# Patient Record
Sex: MALE | Race: WHITE | Employment: OTHER | URBAN - METROPOLITAN AREA
[De-identification: names, ages, dates, MRNs, and addresses within clinical notes are randomized per-mention and may not be internally consistent; named-entity substitution may affect disease eponyms.]

---

## 2017-05-17 ENCOUNTER — OFFICE VISIT (OUTPATIENT)
Dept: NEUROLOGY | Age: 37
End: 2017-05-17

## 2017-05-17 VITALS
TEMPERATURE: 98.4 F | SYSTOLIC BLOOD PRESSURE: 110 MMHG | OXYGEN SATURATION: 98 % | WEIGHT: 122.6 LBS | HEART RATE: 98 BPM | BODY MASS INDEX: 16.61 KG/M2 | HEIGHT: 72 IN | DIASTOLIC BLOOD PRESSURE: 70 MMHG | RESPIRATION RATE: 18 BRPM

## 2017-05-17 DIAGNOSIS — M54.12 LEFT CERVICAL RADICULOPATHY: Primary | ICD-10-CM

## 2017-05-17 DIAGNOSIS — R20.0 LEFT ARM NUMBNESS: ICD-10-CM

## 2017-05-17 NOTE — PROGRESS NOTES
5 [facility] 91   [place] [address] [facility] [address] [facility]0 [facility]   Marlon Gray 57    [place]   [phone] Fax             Referring: Carlos Haas MD      Chief Complaint   Patient presents with   Kindra Ba Establish Care     NP: Neuropathy. Pt complains of \"burning\" pains that radiate from neck down left arm, numbness in different limbs,      33-year-old gentleman who presents today accompanied by his mother for evaluation of what he calls a burning pain in the neck shooting down the left upper extremity and left upper extremity numbness. He said his symptoms of a ongoing now for about 6-8 months worsening but they have been present for longer than that. He does not recall any injury. No inciting factor. He crossed the deltoid down the thenar side of the arm primarily although at times in the ulnar side can be affected as well. He notes that most fingers are affected notes that his left upper extremity feels numb most of the time. He says the left shoulder blade is also numb and hot water to be sensitive by him on the left shoulder blade. He notes that this has now started to happen on the right as well. If he lays flat he has some numbness in the upper extremities. His mother says he is dropping things. He has had previous evaluation back several years ago with MRI of the cervical spine which he was told showed some degenerative changes and he had an EMG that was said to show some abnormality but it was nothing worrisome. His father passed in 2010 [facility] [address] [facility] he notes that when his father was diagnosed a couple years prior to that the family had EMGs performed. He had an MRI of the brain performed because someone was concerned regarding potential demyelinating disease and that was normal.  Those images are in the electronic medical record. He also notes that his gait is off but he attributes that to his hip dysplasia. Uses a cane.   Notes that he does not use a cane he is unable to ambulate steadily. No recent fall. Past Medical History:   Diagnosis Date    ADD (attention deficit disorder)     Anemia     iron deficiency    Asthma     Bipolar 1 disorder (HCC)     Congenital hip dysplasia     Headache     Hip dysplasia, congenital     Psychiatric disorder     Scoliosis     Scoliosis        Past Surgical History:   Procedure Laterality Date    HX ORTHOPAEDIC  1980    left thigh            Allergies   Allergen Reactions    Cymbalta [Duloxetine] Other (comments)     Suicidal thoughts       Social History   Substance Use Topics    Smoking status: Light Tobacco Smoker    Smokeless tobacco: None    Alcohol use No       No family history on file. Review of Systems  Pertinent positives and negatives as stated above. Remainder of comprehensive review is negative. Examination  Visit Vitals    /70    Pulse 98    Temp 98.4 °F (36.9 °C) (Temporal)    Resp 18    Ht 6' (1.829 m)    Wt 55.6 kg (122 lb 9.6 oz)    SpO2 98%    BMI 16.63 kg/m2     He is a very thin gentleman a bit disheveled appearing. Has anicteric sclera. Poor dentition. I do not hear bruit. His heart is regular. He has symmetric pulses. He has no edema of the extremities. On neurological examination he is awake alert oriented and friendly. He is dysmorphic appearing with low set ears. He also has evidence of repaired cleft. He has temporal wasting. He has some frontal bossing as well. He has full versions without nystagmus. Pupils react bilaterally. Optic disc margins not well seen. Symmetric face except for that noted and he has tongue and palate midline. He has no abnormal movement. No pronation or drift. He resists fully in the upper and lower extremities in all muscle groups to direct testing. Reflexes globally depressed throughout. No pathologic reflexes. Intact finger to nose bilaterally. Sensory is unreliable.   He relates with a cane a bit of a limp favoring the right leg slightly wide-based. Impression/Plan  41-year-old gentleman with pain as the upper extremity and shoulder blade. He also endorses numbness of the left upper extremity and he has been dropping things as stated. He has a reassuring examination at this point. He will undergo MRI of the cervical spine as well as of the upper extremities. He will return once that has been completed. This note was created using voice recognition software. Despite editing, there may be syntax errors. This note will not be viewable in [address]

## 2017-05-17 NOTE — MR AVS SNAPSHOT
Visit Information Date & Time Provider Department Dept. Phone Encounter #  
 5/17/2017  2:15 PM Murali Levy MD [address] [phone] [id number] Follow-up Instructions Return for After Tests. Follow-up and Disposition History Your Appointments 6/27/2017 10:00 AM  
Follow Up with Joselyn Sarabia NP [address] ([address]) Appt Note: f/u; MRI & EMG  
 [address] [id number] [phone]  
  
   
 [facility] [address] Upcoming Health Maintenance Date Due Pneumococcal 19-64 Medium Risk (1 of 1 - PPSV23) 9/9/1997 DTaP/Tdap/Td series (1 - Tdap) 9/9/1999 INFLUENZA AGE 9 TO ADULT 8/1/2017 Allergies as of 5/17/2017  Review Complete On: 5/17/2017 By: Shayna Parker LPN Severity Noted Reaction Type Reactions Cymbalta [Duloxetine]  05/17/2017    Other (comments) Suicidal thoughts Current Immunizations  Never Reviewed No immunizations on file. Not reviewed this visit You Were Diagnosed With   
  
 Codes Comments Left cervical radiculopathy    -  Primary ICD-10-CM: M54.12 
ICD-9-CM: 723.4 Left arm numbness     ICD-10-CM: R20.0 ICD-9-CM: 308. 0 Vitals BP Pulse Temp Resp Height(growth percentile) Weight(growth percentile) 110/70 98 98.4 °F (36.9 °C) (Temporal) 18 6' (1.829 m) 122 lb 9.6 oz (55.6 kg) SpO2 BMI Smoking Status 98% 16.63 kg/m2 Light Tobacco Smoker Vitals History BMI and BSA Data Body Mass Index Body Surface Area  
 16.63 kg/m 2 1.68 m 2 Your Updated Medication List  
  
Notice  As of 5/17/2017 11:59 PM  
 You have not been prescribed any medications. Follow-up Instructions Return for After Tests. To-Do List   
 05/17/2017 Neurology:  EMG LIMITED   
  
 05/17/2017 Imaging:  MRI CERV SPINE WO CONT Introducing [facility]! Christina Shipley introduces [employer] patient portal. Now you can access parts of your medical record, email your doctor's office, and request medication refills online. 1. In your internet browser, go to [email or web address] 2. Click on the First Time User? Click Here link in the Sign In box. You will see the New Member Sign Up page. 3. Enter your [employer] Access Code exactly as it appears below. You will not need to use this code after youve completed the sign-up process. If you do not sign up before the expiration date, you must request a new code. · [employer] Access Code: [email or web address] Expires: 8/15/2017  1:46 PM 
 
4. Enter the last four digits of your Social Security Number (xxxx) and Date of Birth (mm/dd/yyyy) as indicated and click Submit. You will be taken to the next sign-up page. 5. Create a [employer] ID. This will be your [employer] login ID and cannot be changed, so think of one that is secure and easy to remember. 6. Create a [employer] password. You can change your password at any time. 7. Enter your Password Reset Question and Answer. This can be used at a later time if you forget your password. 8. Enter your e-mail address. You will receive e-mail notification when new information is available in [address] 9. Click Sign Up. You can now view and download portions of your medical record. 10. Click the Download Summary menu link to download a portable copy of your medical information. If you have questions, please visit the Frequently Asked Questions section of the [employer] website. Remember, [employer] is NOT to be used for urgent needs. For medical emergencies, dial 911. Now available from your iPhone and Android! Please provide this summary of care documentation to your next provider. Your primary care clinician is listed as Taya Avalos. If you have any questions after today's visit, please call [phone].

## 2017-06-05 ENCOUNTER — HOSPITAL ENCOUNTER (OUTPATIENT)
Dept: MRI IMAGING | Age: 37
Discharge: HOME | End: 2017-06-05
Payer: SUBSIDIZED

## 2017-06-05 DIAGNOSIS — R20.0 LEFT ARM NUMBNESS: ICD-10-CM

## 2017-06-05 DIAGNOSIS — M54.12 LEFT CERVICAL RADICULOPATHY: ICD-10-CM

## 2017-06-05 PROCEDURE — 72141 MRI NECK SPINE W/O DYE: CPT

## 2017-06-21 ENCOUNTER — OFFICE VISIT (OUTPATIENT)
Dept: NEUROLOGY | Age: 37
End: 2017-06-21

## 2017-06-21 DIAGNOSIS — M54.12 LEFT CERVICAL RADICULOPATHY: Primary | ICD-10-CM

## 2017-06-21 DIAGNOSIS — R20.0 LEFT ARM NUMBNESS: ICD-10-CM

## 2017-06-21 NOTE — COMMUNICATION BODY
[place] [address] [place], [address]  [phone]      [facility]    Neurophysiology Report      Patient: [facility]     ID: [id number] Physician: Mario Huerta. Natasha Payton MD   Gender: Male Ref Phys: Madina Macias NP   Handedness:      Study Date: June 21, 2017         Patient History: This 53-year-old man has been complaining of numbness and tingling in his arms as well as neck pain.     Nerve Conduction Studies  Anti Sensory Summary Table     Stim Site NR Peak (ms) Norm Peak (ms) O-P Amp (µV) Norm O-P Amp Dist (cm) Anthony (m/s)   Left Median 2nd Digit Anti Sensory (2nd Digit)   Wrist    3.8 <3.5 43.6 >20 13.0 46.4   Site 2    3.8  42.1      Right Median 2nd Digit Anti Sensory (2nd Digit)   Wrist    3.6 <3.5 43.4 >20 13.0 50.0   Site 2    3.6  44.1      Left Ulnar Anti Sensory (5th Digit )   Wrist    3.3 <3.1 40.7 >17.0 11.0 44.0   Site 2    3.3  44.6      Right Ulnar Anti Sensory (5th Digit )   Wrist    3.3 <3.1 35.1 >17.0 11.0 47.8   Site 2    3.4  36.5        Motor Summary Table     Stim Site NR Onset (ms) Norm Onset (ms) O-P Amp (mV) Norm O-P Amp Dist (cm) Anthony (m/s) Norm Anthony (m/s)   Left Median Motor (Abd Poll Brev)   Wrist    4.7 <4.4 8.6 >4.0 21.0 56.8 >49   Elbow    8.4  8.2       Right Median Motor (Abd Poll Brev)   Wrist    4.0 <4.4 9.3 >4.0 21.0 50.0 >49   Elbow    8.2  9.2       Left Ulnar Motor (Abd Dig Minimi )   Wrist    3.0 <3.3 5.0 >6.0 21.0 56.8 >49   B Elbow    6.7  4.9  11.0 52.4 >50   A Elbow    8.8  4.2       Right Ulnar Motor (Abd Dig Minimi )   Wrist    3.3 <3.3 4.8 >6.0 21.0 51.2 >49   B Elbow    7.4  4.5  11.0 50.0 >50   A Elbow    9.6  4.7           EMG     Side Muscle Nerve Root Ins Act Fibs Psw Fasc Amp Dur Poly Recrt Int Vernida Guillermo Comment   Left Deltoid Axillary C5-6 Nml Nml Nml None Nml Nml 0 Nml Nml    Left Biceps Musculocut C5-6 Nml Nml Nml None Nml Nml 0 Nml Nml    Left Triceps Radial C6-7-8 Nml Nml Nml None Nml Nml 0 Nml Nml    Left FlexCarRad Median C6-7 Nml Nml Nml None Nml Nml 0 Nml Nml    Left 1stDorInt Ulnar C8-T1 Nml Nml Nml None Nml Nml 0 Nml Nml    Left Abd Poll Brev Median C8-T1 Nml Nml Nml None Nml Nml 0 Nml Nml    Left Rhomboid Major DorsalScap C5 Nml Nml Nml None Nml Nml 0 Nml Nml    Left Supraspinatus SupraScap C5-6 Nml Nml Nml None Nml Nml 0 Nml Nml    Left Cervical Parasp Up Rami C1-3 Nml Nml Nml          Left Cervical Parasp Mid Rami C4-6 Nml Nml Nml          Left Cervical Parasp Low Rami C7-8 Nml Nml Nml            NCS/EMG FINDINGS:     Evaluation of the Left median motor nerve showed prolonged distal onset latency (4.7 ms).  The Right median motor, the Left ulnar motor, and the Right ulnar motor nerves were unremarkable.  The Left Median 2nd Digit sensory nerve showed prolonged distal peak latency (3.8 ms) and decreased conduction velocity (Wrist-2nd Digit, 46.4 m/s).  The Right Median 2nd Digit sensory nerve showed prolonged distal peak latency (3.6 ms).  The Left ulnar sensory and the Right ulnar sensory nerves showed prolonged distal peak latency (L3.3, R3.3 ms) and decreased conduction velocity (Wrist-5th Digit, L44.0, R47.8 m/s). 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 ___________________________  Anya Porter MD          Waveforms:

## 2017-06-21 NOTE — LETTER
6/21/2017 1:08 PM 
 
Patient:  Janae Services YOB: 1980 Date of Visit: 6/21/2017 Dear Bibiana Juarez MD 
[address] [facility] [address] VIA Facsimile: [phone] Fer Medrano NP 
[address] [facility] [address] [facility] [address] VIA In Basket 
 : Thank you for referring Mr. Janae Jean to me for evaluation/treatment. Below are the relevant portions of my assessment and plan of care. [facility] [address] [facility] [place], [address] 
[phone]      [place] [address] Neurophysiology Report PatientCarpraveena Campbell    
ID: [id number] Physician: Teresa Gresham. Alberto Larsen MD  
Gender: Male Ref Phys: Constance Wang NP Handedness:     
Study Date: June 21, 2017 Patient History: This 12-year-old man has been complaining of numbness and tingling in his arms as well as neck pain. Nerve Conduction Studies Anti Sensory Summary Table Stim Site NR Peak (ms) Norm Peak (ms) O-P Amp (µV) Norm O-P Amp Dist (cm) Anthony (m/s) Left Median 2nd Digit Anti Sensory (2nd Digit) Wrist    3.8 <3.5 43.6 >20 13.0 46.4 Site 2    3.8  42.1 Right Median 2nd Digit Anti Sensory (2nd Digit) Wrist    3.6 <3.5 43.4 >20 13.0 50.0 Site 2    3.6  44.1 Left Ulnar Anti Sensory (5th Digit ) Wrist    3.3 <3.1 40.7 >17.0 11.0 44.0 Site 2    3.3  44.6 Right Ulnar Anti Sensory (5th Digit ) Wrist    3.3 <3.1 35.1 >17.0 11.0 47.8 Site 2    3.4  36.5 Motor Summary Table Stim Site NR Onset (ms) Norm Onset (ms) O-P Amp (mV) Norm O-P Amp Dist (cm) Anthony (m/s) Norm Anthony (m/s) Left Median Motor (Abd Poll Brev) Wrist    4.7 <4.4 8.6 >4.0 21.0 56.8 >49 Elbow    8.4  8.2 Right Median Motor (Abd Poll Brev) Wrist    4.0 <4.4 9.3 >4.0 21.0 50.0 >49 Elbow    8.2  9.2 Left Ulnar Motor (Abd Dig Minimi ) Wrist    3.0 <3.3 5.0 >6.0 21.0 56.8 >49 B Elbow    6.7  4.9  11.0 52.4 >50 A Elbow    8.8  4.2 Right Ulnar Motor (Abd Dig Minimi ) Wrist    3.3 <3.3 4.8 >6.0 21.0 51.2 >49 B Elbow    7.4  4.5  11.0 50.0 >50 A Elbow    9.6  4.7 EMG Side Muscle Nerve Root Ins Act Fibs Psw Fasc Amp Dur Poly Recrt Int Darryl Tatiana Comment Left Deltoid Axillary C5-6 Nml Nml Nml None Nml Nml 0 Nml Nml Left Biceps Musculocut C5-6 Nml Nml Nml None Nml Nml 0 Nml Nml Left Triceps Radial C6-7-8 Nml Nml Nml None Nml Nml 0 Nml Nml Left FlexCarRad Median C6-7 Nml Nml Nml None Nml Nml 0 Nml Nml Left 1stDorInt Ulnar C8-T1 Nml Nml Nml None Nml Nml 0 Nml Nml Left Abd Poll Brev Median C8-T1 Nml Nml Nml None Nml Nml 0 Nml Nml Left Rhomboid Major DorsalScap C5 Nml Nml Nml None Nml Nml 0 Nml Nml Left Supraspinatus SupraScap C5-6 Nml Nml Nml None Nml Nml 0 Nml Nml Left Cervical Parasp Up Rami C1-3 Nml Nml Nml Left Cervical Parasp Mid Rami C4-6 Nml Nml Nml Left Cervical Parasp Low Rami C7-8 Nml Nml Nml NCS/EMG FINDINGS: 
 
? Evaluation of the Left median motor nerve showed prolonged distal onset latency (4.7 ms). ? The Right median motor, the Left ulnar motor, and the Right ulnar motor nerves were unremarkable. ? The Left Median 2nd Digit sensory nerve showed prolonged distal peak latency (3.8 ms) and decreased conduction velocity (Wrist-2nd Digit, 46.4 m/s). ? The Right Median 2nd Digit sensory nerve showed prolonged distal peak latency (3.6 ms). ? The Left ulnar sensory and the Right ulnar sensory nerves showed prolonged distal peak latency (L3.3, R3.3 ms) and decreased conduction velocity (Wrist-5th Digit, L44.0, R47.8 m/s). INTERPRETATION: This was an abnormal nerve conduction EMG study showing there to be: 
 
 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 
 
 (2) prolongation of the distal latency in the left median nerve supportive of a left-sided carpal tunnel syndrome. ___________________________ Yoan Lin MD 
 
 
 
 
 Waveforms: If you have questions, please do not hesitate to call me. I look forward to following Mr. Zuniga along with you.  
 
 
 
Sincerely, 
 
 
Omar Barrientos MD

## 2017-06-21 NOTE — PROGRESS NOTES
[facility]  [address] [place] [address] [place], [address] [phone]      [address]    Neurophysiology Report      Patient: [facility]     ID: [id number] Physician: Wilian Shea. Gonzalo Novoa MD   Gender: Male Ref Phys: Jun Sears NP   Handedness:      Study Date: June 21, 2017         Patient History: This 68-year-old man has been complaining of numbness and tingling in his arms as well as neck pain.     Nerve Conduction Studies  Anti Sensory Summary Table     Stim Site NR Peak (ms) Norm Peak (ms) O-P Amp (µV) Norm O-P Amp Dist (cm) Anthony (m/s)   Left Median 2nd Digit Anti Sensory (2nd Digit)   Wrist    3.8 <3.5 43.6 >20 13.0 46.4   Site 2    3.8  42.1      Right Median 2nd Digit Anti Sensory (2nd Digit)   Wrist    3.6 <3.5 43.4 >20 13.0 50.0   Site 2    3.6  44.1      Left Ulnar Anti Sensory (5th Digit )   Wrist    3.3 <3.1 40.7 >17.0 11.0 44.0   Site 2    3.3  44.6      Right Ulnar Anti Sensory (5th Digit )   Wrist    3.3 <3.1 35.1 >17.0 11.0 47.8   Site 2    3.4  36.5        Motor Summary Table     Stim Site NR Onset (ms) Norm Onset (ms) O-P Amp (mV) Norm O-P Amp Dist (cm) Anthony (m/s) Norm Anthony (m/s)   Left Median Motor (Abd Poll Brev)   Wrist    4.7 <4.4 8.6 >4.0 21.0 56.8 >49   Elbow    8.4  8.2       Right Median Motor (Abd Poll Brev)   Wrist    4.0 <4.4 9.3 >4.0 21.0 50.0 >49   Elbow    8.2  9.2       Left Ulnar Motor (Abd Dig Minimi )   Wrist    3.0 <3.3 5.0 >6.0 21.0 56.8 >49   B Elbow    6.7  4.9  11.0 52.4 >50   A Elbow    8.8  4.2       Right Ulnar Motor (Abd Dig Minimi )   Wrist    3.3 <3.3 4.8 >6.0 21.0 51.2 >49   B Elbow    7.4  4.5  11.0 50.0 >50   A Elbow    9.6  4.7           EMG     Side Muscle Nerve Root Ins Act Fibs Psw Fasc Amp Dur Poly Recrt Int Harpreet Lozano Comment   Left Deltoid Axillary C5-6 Nml Nml Nml None Nml Nml 0 Nml Nml    Left Biceps Musculocut C5-6 Nml Nml Nml None Nml Nml 0 Nml Nml    Left Triceps Radial C6-7-8 Nml Nml Nml None Nml Nml 0 Nml Nml    Left FlexCarRad Median C6-7 Nml Nml Nml None Nml Nml 0 Nml Nml    Left 1stDorInt Ulnar C8-T1 Nml Nml Nml None Nml Nml 0 Nml Nml    Left Abd Poll Brev Median C8-T1 Nml Nml Nml None Nml Nml 0 Nml Nml    Left Rhomboid Major DorsalScap C5 Nml Nml Nml None Nml Nml 0 Nml Nml    Left Supraspinatus SupraScap C5-6 Nml Nml Nml None Nml Nml 0 Nml Nml    Left Cervical Parasp Up Rami C1-3 Nml Nml Nml          Left Cervical Parasp Mid Rami C4-6 Nml Nml Nml          Left Cervical Parasp Low Rami C7-8 Nml Nml Nml            NCS/EMG FINDINGS:     Evaluation of the Left median motor nerve showed prolonged distal onset latency (4.7 ms).  The Right median motor, the Left ulnar motor, and the Right ulnar motor nerves were unremarkable.  The Left Median 2nd Digit sensory nerve showed prolonged distal peak latency (3.8 ms) and decreased conduction velocity (Wrist-2nd Digit, 46.4 m/s).  The Right Median 2nd Digit sensory nerve showed prolonged distal peak latency (3.6 ms).  The Left ulnar sensory and the Right ulnar sensory nerves showed prolonged distal peak latency (L3.3, R3.3 ms) and decreased conduction velocity (Wrist-5th Digit, L44.0, R47.8 m/s). 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 ___________________________  Car Larsen MD          Waveforms:                      [facility]  OFFICE PROCEDURE PROGRESS NOTE        Chart reviewed for the following:   Pascual Gonzales MD, have reviewed the History, Physical and updated the Allergic reactions for [place] [address] performed immediately prior to start of procedure:   Pascual Gonzales MD, have performed the following reviews on [facility] prior to the start of the procedure:            * Patient was identified by name and date of birth   * Agreement on procedure being performed was verified  * Risks and Benefits explained to the patient  * Procedure site verified and marked as necessary  * Patient was positioned for comfort  * Consent was signed and verified     Time: 1:08 PM    Date of procedure: 6/21/2017    Procedure performed by:  Omar Barrientos MD    Provider assisted by: Neymar Hein     Patient assisted by: self    How tolerated by patient: tolerated the procedure well with no complications    Post Procedural Pain Scale: 0 - No Hurt    Comments: none

## 2017-06-27 ENCOUNTER — OFFICE VISIT (OUTPATIENT)
Dept: NEUROLOGY | Age: 37
End: 2017-06-27

## 2017-06-27 VITALS
BODY MASS INDEX: 16.9 KG/M2 | HEIGHT: 72 IN | HEART RATE: 86 BPM | SYSTOLIC BLOOD PRESSURE: 110 MMHG | DIASTOLIC BLOOD PRESSURE: 70 MMHG | TEMPERATURE: 97.9 F | WEIGHT: 124.8 LBS | RESPIRATION RATE: 18 BRPM | OXYGEN SATURATION: 99 %

## 2017-06-27 DIAGNOSIS — R20.0 NUMBNESS AND TINGLING IN LEFT UPPER EXTREMITY: ICD-10-CM

## 2017-06-27 DIAGNOSIS — R20.2 NUMBNESS AND TINGLING IN LEFT UPPER EXTREMITY: ICD-10-CM

## 2017-06-27 DIAGNOSIS — R20.2 NUMBNESS AND TINGLING OF RIGHT ARM: ICD-10-CM

## 2017-06-27 DIAGNOSIS — R20.0 NUMBNESS AND TINGLING OF RIGHT ARM: ICD-10-CM

## 2017-06-27 DIAGNOSIS — M48.02 CERVICAL SPINAL STENOSIS: Primary | ICD-10-CM

## 2017-06-27 NOTE — MR AVS SNAPSHOT
Visit Information Date & Time Provider Department Dept. Phone Encounter #  
 6/27/2017 10:00 AM Billie Rosenthal NP [facility] [facility] [phone] [id number] Follow-up Instructions Return in about 3 months (around 9/27/2017). Upcoming Health Maintenance Date Due Pneumococcal 19-64 Medium Risk (1 of 1 - PPSV23) 9/9/1997 DTaP/Tdap/Td series (1 - Tdap) 9/9/1999 INFLUENZA AGE 9 TO ADULT 8/1/2017 Allergies as of 6/27/2017  Review Complete On: 6/27/2017 By: Cloteal Nip, LPN Severity Noted Reaction Type Reactions Cymbalta [Duloxetine]  05/17/2017    Other (comments) Suicidal thoughts Current Immunizations  Never Reviewed No immunizations on file. Not reviewed this visit You Were Diagnosed With   
  
 Codes Comments Cervical spinal stenosis    -  Primary ICD-10-CM: M48.02 
ICD-9-CM: 723.0 Numbness and tingling in left upper extremity     ICD-10-CM: R20.0, R20.2 ICD-9-CM: 782.0 Numbness and tingling of right arm     ICD-10-CM: R20.0, R20.2 ICD-9-CM: 312. 0 Vitals BP Pulse Temp Resp Height(growth percentile) Weight(growth percentile) 110/70 86 97.9 °F (36.6 °C) (Temporal) 18 6' (1.829 m) 124 lb 12.8 oz (56.6 kg) SpO2 BMI Smoking Status 99% 16.93 kg/m2 Light Tobacco Smoker BMI and BSA Data Body Mass Index Body Surface Area  
 16.93 kg/m 2 1.7 m 2 Your Updated Medication List  
  
Notice  As of 6/27/2017 10:43 AM  
 You have not been prescribed any medications. We Performed the Following REFERRAL TO PHYSICAL THERAPY [QGT67 Custom] Comments:  
 Please evaluate patient for cervical pain and pain to the upper extremities L>R. Follow-up Instructions Return in about 3 months (around 9/27/2017). Referral Information Referral ID Referred By Referred To  
  
 [id number] Chasity Deras Not Available Visits Status Start Date End Date 1 New Request 6/27/17 6/27/18 If your referral has a status of pending review or denied, additional information will be sent to support the outcome of this decision. Introducing [facility]! Dear Rosalie Roldan: Thank you for requesting a [employer] account. Our records indicate that you already have an active [employer] account. You can access your account anytime at [email or web address] Did you know that you can access your hospital and ER discharge instructions at any time in [employer]? You can also review all of your test results from your hospital stay or ER visit. Additional Information If you have questions, please visit the Frequently Asked Questions section of the [employer] website at [email or web address] Remember, [employer] is NOT to be used for urgent needs. For medical emergencies, dial 911. Now available from your iPhone and Android! Please provide this summary of care documentation to your next provider. Your primary care clinician is listed as Megan Pyle. If you have any questions after today's visit, please call [phone].

## 2017-06-27 NOTE — PROGRESS NOTES
[facility]  [address], [address], [facility], [address]  [address] Yanick Garcia, [address]  Office:  [phone]  Fax: [phone]  Chief Complaint   Patient presents with    Neurologic Problem     F/U cervical radiculopathy, mri, emg      This is a 43year old male presents for follow up results. He continues with neck pain and pain going down his left arm. He says his right arm is also affected. Both limbs can have numbness and tingling but definitely worse on the left side. He reports his father passed from [address] 10 years ago and he had concerns of his symptoms being similar. He has a negative MRI of the brain for any demyelinating process. He has a past EMG that was unrevealing. He denies history of past injury of aggravating factors, but he does recall \"sparring\" with his brother when they were younger and wonders if this is why he has problems with his neck. He has not tried physical therapy. He is not taking any daily prescriptions to manage his symptoms. Past Medical History:   Diagnosis Date    ADD (attention deficit disorder)     Anemia     iron deficiency    Asthma     Bipolar 1 disorder (HCC)     Congenital hip dysplasia     Headache     Hip dysplasia, congenital     Psychiatric disorder     Scoliosis     Scoliosis        Past Surgical History:   Procedure Laterality Date    HX ORTHOPAEDIC  1980    left thigh            Allergies   Allergen Reactions    Cymbalta [Duloxetine] Other (comments)     Suicidal thoughts       Social History   Substance Use Topics    Smoking status: Light Tobacco Smoker    Smokeless tobacco: Former User    Alcohol use No       History reviewed. No pertinent family history. Review of Systems  Pertinent positives and negatives as noted otherwise comprehensive review is negative.     Examination  Visit Vitals    /70    Pulse 86    Temp 97.9 °F (36.6 °C) (Temporal)    Resp 18    Ht 6' (1.829 m)    Wt 56.6 kg (124 lb 12.8 oz)    SpO2 99%    BMI 16.93 kg/m2     This is a pleasant 43year old male, well appearing. No icterus. Oropharynx clear. Supple neck without bruit. Heart regular. No murmur. No edema. Neurologically, he is awake, alert, oriented with normal speech, language, and cognition. Visual fields are full to direct confrontational testing. Pupils react bilaterally. He has full versions without nystagmus. Face with a well healed scar above his top lip vertical with symmetrical facial sensation. Tongue and palate are midline. Shoulder shrug is symmetrical. Hearing is intact to conversation. He has normal bulk and tone. There is no abnormal movement. There is no pronation or drift. He is able to generate full strength in the upper and lower extremities. Reflexes are symmetrical in the upper and lower extremities bilaterally. His toes are down going. There is no finger flexor reflex bilaterally. Finger nose finger and rapid alternating movements are normal.  He is able to ambulate without difficulty. He ambulated with a cane. Impression/Plan  Stephen Bassett is a 43 y.o. male whose history and physical are consistent with cervical stenosis. We discussed results of cervical MRI and finding consistent with degeneration and cervical stenosis. EMG findings are not consistent with a radiculopathy. Patient has not tried physical therapy and is open to this. I will start with conservative management and have him evaluated by physical therapy. Follow up in 3 months. At that time assess efficacy of treatment if symptoms persist consider referral for neurosurgery. Discussed plan with Dr. Santana Mayo. Patient agreeable with plan of care. Ketty Mitchell was seen today for neurologic problem.     Diagnoses and all orders for this visit:    Cervical spinal stenosis  -     REFERRAL TO PHYSICAL THERAPY    Numbness and tingling in left upper extremity  -     REFERRAL TO PHYSICAL THERAPY    Numbness and tingling of right arm  -     REFERRAL TO PHYSICAL THERAPY    Total time: 28 min   Counseling / coordination time: 20 min   > 50% counseling / coordination?: Yes re: symptoms, results, history, plan of care    Diamond Renee NP  This note will not be viewable in [address]

## 2017-07-11 ENCOUNTER — HOSPITAL ENCOUNTER (OUTPATIENT)
Dept: PHYSICAL THERAPY | Age: 37
Discharge: HOME | End: 2017-07-11
Payer: SUBSIDIZED

## 2017-07-11 PROCEDURE — 97162 PT EVAL MOD COMPLEX 30 MIN: CPT

## 2017-07-11 PROCEDURE — 97530 THERAPEUTIC ACTIVITIES: CPT

## 2017-07-11 NOTE — PROGRESS NOTES
[facility]  [place] [facility] [address]  [phone] fax    Plan of Care/ Statement of Necessity for Physical Therapy Services    Patient name: Rivas Erickson Start of Care: 2017   Referral source: Erica Bonds NP : 1980    Medical Diagnosis: Spinal stenosis, cervical region [M48.02]   Onset Date:7-8 years ago with progressive decline    Treatment Diagnosis: Cervicalgia with Radiculopathy    Prior Hospitalization: see medical history Provider#: [id number]   Medications: Verified on Patient summary List    Comorbidities: fetal alcohol syndrome, asthma, scoliosis, weight change > 10 lbs, arthritis, depression, tobacco use (1 pack every 3 days), bipolar   Prior Level of Function: lives with friends in a 1 story home with HR at entry steps, computer for recreational time     The Plan of Care and following information is based on the information from the initial evaluation. Assessment/ key information: Pt is a 44 yo M who presents with cervicalgia and L scapular pain after insidious onset of full body muscle spasms 7-8 years ago. Subjective reports of intermittent \"locking\" of the cervical spine that limits motions in all directions. When \"locking\" is not present, he does not have any pain/restrictions. Pt is a poor historian and is unable to relay true aggravating/relieving factors. He also reports tingling down LLE that extend to digits 2-4 primarily. MRI reveals multilevel degenerative changes most pronounced at C6/7 where there is mild central canal stenosis, multilevel foraminal stenosis most pronounced left-sided foraminal stenosis at C6/C7 and C5/C6, likely the cause of cervical radiculopathy. Pt is TTP spinous processes C5-C6 and T1-T7. Pt declined palpation in caudal progression below T7, reporting that palpation of lower thoracic typically causes muscles spasms.   Cervical AROM is decreased with painful end-range all directions. B scapula rest in winging and abducted position R > L, and decreased upward rotation of L scapula is evident with shoulder elevation. Neurological screen was inconclusive. Sensation to light touch was intact BUE; however, pt reports reduced intensity of sensation throughout anterior aspect of L arm. Myotome screen was WNL, and reflexes were 1+ throughout BUE. Findings consistent with referring diagnosis of degenerative joint disease. Pt will benefit from skilled PT to address ROM, strength, flexibility, and postural deficits for reduced pain with daily tasks and improved QOL. Evaluation Complexity History HIGH Complexity :3+ comorbidities / personal factors will impact the outcome/ POC ; Examination HIGH Complexity : 4+ Standardized tests and measures addressing body structure, function, activity limitation and / or participation in recreation  ;Presentation HIGH Complexity : Unstable and unpredictable characteristics  ; Clinical Decision Making MEDIUM Complexity : FOTO score of 26-74 - based on clinical judgement   Overall Complexity Rating: MEDIUM  Problem List: pain affecting function, decrease ROM, decrease strength, impaired gait/ balance, decrease ADL/ functional abilitiies, decrease activity tolerance and decrease flexibility/ joint mobility   Treatment Plan may include any combination of the following: Therapeutic exercise, Therapeutic activities, Neuromuscular re-education, Physical agent/modality, Gait/balance training, Manual therapy, Patient education, Self Care training, Functional mobility training, Home safety training and Stair training  Patient / Family readiness to learn indicated by: asking questions, trying to perform skills and interest  Persons(s) to be included in education: patient (P) and family support person (FSP);list friend/caregiver  Barriers to Learning/Limitations: None  Patient Goal (s): reduced pain/symtoms  Patient Self Reported Health Status: poor  Rehabilitation Potential: fair    Short Term Goals: To be accomplished in 1 weeks:  1. Pt will be compliant with initial HEP to improve therapy outcomes   Long Term Goals: To be accomplished in 6 weeks:  1. Pt will improve FOTO by 5 points in order to demonstrate functional improvement   2. Pt will improve cervical SB/rotation AROM by 5 degrees B in order to improve ease of head movements with ADLs  3. Pt will report <5/10 pain in order to improve sleeping ability  4. Pt will report 50% improvement with radicular sx in order to improve QOL     Frequency / Duration: Patient to be seen 2-3 times per week for 6 weeks. Patient/ CarPatient/ Caregiver education and instruction: Diagnosis, prognosis, exercises   [x]  Plan of care has been reviewed with ABIGAIL Salmeron PT 7/11/2017 2:44 PM    ________________________________________________________________________    I certify that the above Therapy Services are being furnished while the patient is under my care. I agree with the treatment plan and certify that this therapy is necessary.     Physician's Signature:____________________  Date:____________Time: _________    Please sign and return to [facility]  [address]  [phone] fax

## 2017-07-13 ENCOUNTER — HOSPITAL ENCOUNTER (OUTPATIENT)
Dept: PHYSICAL THERAPY | Age: 37
Discharge: HOME | End: 2017-07-13
Payer: SUBSIDIZED

## 2017-07-13 PROCEDURE — 97110 THERAPEUTIC EXERCISES: CPT

## 2017-07-13 PROCEDURE — 97530 THERAPEUTIC ACTIVITIES: CPT

## 2017-07-13 PROCEDURE — 97140 MANUAL THERAPY 1/> REGIONS: CPT

## 2017-07-13 NOTE — PROGRESS NOTES
PT DAILY TREATMENT NOTE     Patient Name: Grandville Meigs  Date:2017  : 1980  [x]  Patient  Verified  Payor: [place] / Plan: 1500  / Product Type: Medicaid /    In time:3:00  Out time: 4:00  Total Treatment Time (min): 60  Visit #: 2 of     Treatment Area: Spinal stenosis, cervical region [M48.02]    SUBJECTIVE  Pain Level (0-10 scale): 7/10  Any medication changes, allergies to medications, adverse drug reactions, diagnosis change, or new procedure performed?: [x] No    [] Yes (see summary sheet for update)  Subjective functional status/changes:   [] No changes reported  Pt reports that he's tried his exercises at home. He knows that therapy had told him to keep his arms in neutral and just change his head position for the UT/LS stretch at home, but he wanted to see what it felt like when her performed the arm motions. The LS stretch didn't hurt. He had some pain in his L shoulder blade when he performed the L UT stretch with his L arm overhead. Pt follows up with MD 17 and is going to ask for a script for back/hip pain and balance.      OBJECTIVE    Modality rationale: decrease pain and increase tissue extensibility to improve the patients ability to tolerate head movements with daily tasks    Min Type Additional Details    [] Estim:  []Unatt       []IFC  []Premod                        []Other:  []w/ice   []w/heat  Position:  Location:    [] Estim: []Att    []TENS instruct  []NMES                    []Other:  []w/US   []w/ice   []w/heat  Position:  Location:    []  Traction: [] Cervical       []Lumbar                       [] Prone          []Supine                       []Intermittent   []Continuous Lbs:  [] before manual  [] after manual    []  Ultrasound: []Continuous   [] Pulsed                           []1MHz   []3MHz W/cm2:  Location:    []  Iontophoresis with dexamethasone         Location: [] Take home patch   [] In clinic   10 []  Ice [x]  heat  []  Ice massage  []  Laser   []  Anodyne Position: seated  Location: B UT    []  Laser with stim  []  Other:  Position:  Location:    []  Vasopneumatic Device Pressure:       [] lo [] med [] hi   Temperature: [] lo [] med [] hi   [x] Skin assessment post-treatment:  [x]intact []redness- no adverse reaction    []redness  adverse reaction:     33 min Therapeutic Exercise:  [x] See flow sheet :   Rationale: increase ROM and increase strength to improve the patients ability to improve ease of head movements with daily tasks       10 min Manual Therapy:  TPR L UT/LS/MT   Rationale: decrease pain, increase ROM, increase tissue extensibility and decrease trigger points to improve ease of head movements with daily tasks    7 min Therapeutic Activity: pt education regarding anatomy of cervical musculature and guarding likely contributing to trigger points, heat use 10-15 minutes, self TPR with SPC and fingers (having pt try with proper form), self DTM/TPR with tennis ball leaning into wall             With   [x] TE   [] TA   [] neuro   [] other: Patient Education: [x] Review HEP    [] Progressed/Changed HEP based on:   [] positioning   [] body mechanics   [] transfers   [] heat/ice application    [] other:      Other Objective/Functional Measures:     Pain with L UT stretch with LUE overhead before manual  No pain with L UT stretch with LUE overhead after manual  Multiple trigger points throughout L UT/LS/MT, responded well to TPR  Pt had a lot of questions regarding self-manual TPR and DTM techniques  No increased pain with therapy     TUG   Trial 1: 20 seconds  Trial 2: 15 seconds   Average: 17.5 seconds     Pain Level (0-10 scale) post treatment: 6.5/10    ASSESSMENT/Changes in Function:     Initiated treatment per POC. Pt was motivated in therapy and put forth good effort with therex.   Despite minor decrease in pain number, pt reported significant relief subjectively with trigger points, reporting least pain/most relaxed sensation of musculature in years. He was educated on self-TPR with SPC handle and tennis ball against wall for independent management of sx. TUG was performed as pt reported fear of falling at initial evaluation (time constraint during initial evaluation). TUG score of 17.5 seconds is indicative of fall risk. Pt is following up with MD 7/17/17 and is going to ask for a script in therapy to address back/hip pain as well as balance. He has applied for the care card and will return to therapy with new script once care card is approved so he can afford it. Will continue to address strength, flexibility, and postural deficits for improved ease of cervical mobility and return to PLOF. Patient will continue to benefit from skilled PT services to modify and progress therapeutic interventions, address ROM deficits, address strength deficits, analyze and address soft tissue restrictions, analyze and cue movement patterns, assess and modify postural abnormalities and instruct in home and community integration to attain remaining goals. [x]  See Plan of Care  []  See progress note/recertification  []  See Discharge Summary         Progress towards goals / Updated goals:  Short Term Goals: To be accomplished in 1 weeks:  1. Pt will be compliant with initial HEP to improve therapy outcomes Goal met. 7/13/17  Long Term Goals: To be accomplished in 6 weeks:  1. Pt will improve FOTO by 5 points in order to demonstrate functional improvement   2. Pt will improve cervical SB/rotation AROM by 5 degrees B in order to improve ease of head movements with ADLs  3. Pt will report <5/10 pain in order to improve sleeping ability  4.  Pt will report 50% improvement with radicular sx in order to improve QOL        PLAN  []  Upgrade activities as tolerated     [x]  Continue plan of care  []  Update interventions per flow sheet       []  Discharge due to:_  []  Other:_      Usama Lee PT 7/13/2017  4:58 PM    Future Appointments  Date Time Provider Joseph Elisabeth   7/20/2017 3:30 PM Enzo Frias, [place] [facility]   7/24/2017 4:00 PM Dahinda Rodriguez, PTA [facility]   7/26/2017 4:30 PM Dahinda Rodriguez, PTA [facility]   7/31/2017 1:00 PM Moreno Ross, PT [email or web address] [facility]   8/2/2017 4:30 PM Enzo Mauros, PTA [facility]   8/8/2017 3:30 PM Moreno Ross, PT [id number] [facility]   8/10/2017 3:00 PM Moreno Ross, PT [id number] [facility]   8/14/2017 2:30 PM Moreno Ross, PT [facility]   8/16/2017 4:00 PM Enzo Frias, PTA [facility]

## 2017-07-20 ENCOUNTER — HOSPITAL ENCOUNTER (OUTPATIENT)
Dept: PHYSICAL THERAPY | Age: 37
Discharge: HOME | End: 2017-07-20
Payer: SUBSIDIZED

## 2017-07-20 PROCEDURE — 97140 MANUAL THERAPY 1/> REGIONS: CPT

## 2017-07-20 PROCEDURE — 97110 THERAPEUTIC EXERCISES: CPT

## 2017-07-20 NOTE — PROGRESS NOTES
PT DAILY TREATMENT NOTE     Patient Name: Meeta Arriola  Date:2017  : 1980  [x]  Patient  Verified  Payor: [place] / Plan: 1500 / Product Type: Medicaid /    In time:337  Out time:420  Total Treatment Time (min): 43  Visit #: 3 of     Treatment Area: Spinal stenosis, cervical region [M48.02]    SUBJECTIVE  Pain Level (0-10 scale): 4-5/10  Any medication changes, allergies to medications, adverse drug reactions, diagnosis change, or new procedure performed?: [x] No    [] Yes (see summary sheet for update)  Subjective functional status/changes:   [] No changes reported  Pt stated that today is a good day    OBJECTIVE    Modality rationale: decrease pain and increase tissue extensibility to improve the patients ability to increase ease with ADLs   Min Type Additional Details    [] Estim:  []Unatt       []IFC  []Premod                        []Other:  []w/ice   []w/heat  Position:  Location:    [] Estim: []Att    []TENS instruct  []NMES                    []Other:  []w/US   []w/ice   []w/heat  Position:  Location:    []  Traction: [] Cervical       []Lumbar                       [] Prone          []Supine                       []Intermittent   []Continuous Lbs:  [] before manual  [] after manual    []  Ultrasound: []Continuous   [] Pulsed                           []1MHz   []3MHz W/cm2:  Location:    []  Iontophoresis with dexamethasone         Location: [] Take home patch   [] In clinic   10 []  Ice     [x]  heat  []  Ice massage  []  Laser   []  Anodyne Position:seated  Location:L neck    []  Laser with stim  []  Other:  Position:  Location:    []  Vasopneumatic Device Pressure:       [] lo [] med [] hi   Temperature: [] lo [] med [] hi   [x] Skin assessment post-treatment:  [x]intact []redness- no adverse reaction    []redness  adverse reaction:     25 min Therapeutic Exercise:  [x] See flow sheet :   Rationale: increase ROM and increase strength to improve the patients ability to increase ease with ADLs    8 min Manual Therapy:  DTM and MFR to L UT and Lev   Rationale: decrease pain, increase ROM and increase tissue extensibility to increase ease with aDLs    With   [x] TE   [] TA   [] neuro   [] other: Patient Education: [x] Review HEP    [] Progressed/Changed HEP based on:   [] positioning   [] body mechanics   [] transfers   [] heat/ice application    [] other:      Other Objective/Functional Measures:   Pt was very slow with exercises  Was argumentative about performing new exercises that he is not doing at home  Cont with significant tightness in B UT   Pt has multiple trp in L UT which responded fairly well with manual     Pain Level (0-10 scale) post treatment: 7/10    ASSESSMENT/Changes in Function:   Pt is slowly progressing toward goals. Pain level is slowly decreasing. Pt had no report of radicular sx's today    Patient will continue to benefit from skilled PT services to modify and progress therapeutic interventions, address functional mobility deficits, address ROM deficits and address strength deficits to attain remaining goals. [x]  See Plan of Care  []  See progress note/recertification  []  See Discharge Summary         Progress towards goals / Updated goals:  Short Term Goals: To be accomplished in 1 weeks:  1. Pt will be compliant with initial HEP to improve therapy outcomes Goal met. 7/13/17  Long Term Goals: To be accomplished in 6 weeks:  1. Pt will improve FOTO by 5 points in order to demonstrate functional improvement   2. Pt will improve cervical SB/rotation AROM by 5 degrees B in order to improve ease of head movements with ADLs  3. Pt will report <5/10 pain in order to improve sleeping ability  Progressing. Pt had reported pain level of 4-5/10 today. 7/20/17  4.  Pt will report 50% improvement with radicular sx in order to improve QOL     PLAN  []  Upgrade activities as tolerated     [x]  Continue plan of care  []  Update interventions per flow sheet       []  Discharge due to:_  []  Other:_      Dione Sawant, PTA 7/20/2017  3:40 PM    Future Appointments  Date Time Provider Joseph Barber   7/24/2017 4:00 PM Mary Skaggs, [place] [facility]   7/26/2017 4:30 PM Mary Skaggs, PTA [facility]   7/31/2017 1:00 PM Mary Skaggs, PTA [facility]   8/2/2017 4:30 PM Mary Skaggs, PTA [facility]   8/8/2017 3:30 PM Deonna Mac, PT [facility]   8/10/2017 3:00 PM Deonna Mac, PT [id number] [facility]   8/14/2017 2:30 PM Deonna Mac, PT [facility]   8/16/2017 4:00 PM Mary Skaggs, PTA [facility]

## 2017-07-24 ENCOUNTER — HOSPITAL ENCOUNTER (OUTPATIENT)
Dept: PHYSICAL THERAPY | Age: 37
Discharge: HOME | End: 2017-07-24
Payer: SUBSIDIZED

## 2017-07-24 PROCEDURE — 97110 THERAPEUTIC EXERCISES: CPT

## 2017-07-24 NOTE — PROGRESS NOTES
PT DAILY TREATMENT NOTE     Patient Name: Lani Flores  Date:2017  : 1980  [x]  Patient  Verified  Payor: [place] / Plan: 1500 / Product Type: Medicaid /    In time: 4:00  Out time: 4:40  Total Treatment Time (min): 40  Visit #: 4 of     Treatment Area: Spinal stenosis, cervical region [M48.02]    SUBJECTIVE  Pain Level (0-10 scale): 7/10  Any medication changes, allergies to medications, adverse drug reactions, diagnosis change, or new procedure performed?: [x] No    [] Yes (see summary sheet for update)  Subjective functional status/changes:   [] No changes reported  Pt reports he continues to get pain at the back base of his neck along with burning down his shoulders. Pt notes tingling like pins and needs down his L arm to his hand and all fingers; he does sometimes get symptoms to his R arm but not as much as his L.  Pt follows up with PCP on  and will likely get referred to a spine specialist.     OBJECTIVE    Modality rationale: decrease pain and increase tissue extensibility to improve the patients ability to sleep   Min Type Additional Details    [] Estim:  []Unatt       []IFC  []Premod                        []Other:  []w/ice   []w/heat  Position:  Location:    [] Estim: []Att    []TENS instruct  []NMES                    []Other:  []w/US   []w/ice   []w/heat  Position:  Location:    []  Traction: [] Cervical       []Lumbar                       [] Prone          []Supine                       []Intermittent   []Continuous Lbs:  [] before manual  [] after manual    []  Ultrasound: []Continuous   [] Pulsed                           []1MHz   []3MHz W/cm2:  Location:    []  Iontophoresis with dexamethasone         Location: [] Take home patch   [] In clinic   10 []  Ice     [x]  heat  []  Ice massage  []  Laser   []  Anodyne Position:seated  Location: B [facility]    []  Laser with stim  []  Other:  Position:  Location:    []  Vasopneumatic Device Pressure:       [] lo [] med [] hi   Temperature: [] lo [] med [] hi   [x] Skin assessment post-treatment:  [x]intact []redness- no adverse reaction    []redness  adverse reaction:     25 min Therapeutic Exercise:  [x] See flow sheet :   Rationale: increase ROM and increase strength to improve the patients ability to perform ADLs and centralize symptoms    5 min Manual Therapy:  KT to inhibit L levator scap; TPR L levator scap   Rationale: decrease pain, increase ROM and increase tissue extensibility to centralize symptoms for ease of performing ADLs          With   [] TE   [] TA   [] neuro   [] other: Patient Education: [x] Review HEP    [] Progressed/Changed HEP based on:   [] positioning   [] body mechanics   [] transfers   [] heat/ice application    [] other:      Other Objective/Functional Measures:   TTP L levator scap  Improved R c/s rotation with SNAGs and decreased pain  No increased pain or radicular symptoms during session     Pain Level (0-10 scale) post treatment: 6.5/10    ASSESSMENT/Changes in Function: Pt making slow progress towards goals in therapy. Pt continues with forward head and shoulder posture. Pt still getting radicular symptoms down L>R UEs. Will continue to progress posture awareness and c/s mobility for ease of sleeping and performing ADLs. Patient will continue to benefit from skilled PT services to modify and progress therapeutic interventions, address functional mobility deficits, address ROM deficits, address strength deficits, analyze and address soft tissue restrictions, analyze and cue movement patterns, analyze and modify body mechanics/ergonomics, assess and modify postural abnormalities, address imbalance/dizziness and instruct in home and community integration to attain remaining goals. Progress towards goals / Updated goals:  Short Term Goals: To be accomplished in 1 weeks:  1. Pt will be compliant with initial HEP to improve therapy outcomes Goal met. 7/13/17  Long Term Goals: To be accomplished in 6 weeks:  1. Pt will improve FOTO by 5 points in order to demonstrate functional improvement   2. Pt will improve cervical SB/rotation AROM by 5 degrees B in order to improve ease of head movements with ADLs  3. Pt will report <5/10 pain in order to improve sleeping ability  Progressing. Pt had reported pain level of 4-5/10 today. 7/20/17  4.  Pt will report 50% improvement with radicular sx in order to improve QOL        PLAN  [x]  Upgrade activities as tolerated     [x]  Continue plan of care  []  Update interventions per flow sheet       []  Discharge due to:_  []  Other:_      Gely Siddiqui PTA 7/24/2017  1:59 PM    Future Appointments  Date Time Provider Joseph Barber   7/24/2017 4:00 PM Gely Siddiqui, PTA [facility] [address]   7/26/2017 4:30 PM eGly Siddiqui, PTA [facility] [address]   7/31/2017 1:00 PM Gely Siddiqui, PTA [facility] [address]   8/2/2017 4:30 PM Gely Siddiqui, PTA [facility] [address]   8/8/2017 3:30 PM Frances Salmeron, PT [facility] [address]   8/10/2017 3:00 PM Frances Salmeron, PT [id number] [address]   8/14/2017 2:30 PM Frances Salmeron, PT [facility] [address]   8/16/2017 4:00 PM Gely Siddiqui, PTA [facility] [address]

## 2017-07-26 ENCOUNTER — HOSPITAL ENCOUNTER (OUTPATIENT)
Dept: PHYSICAL THERAPY | Age: 37
Discharge: HOME | End: 2017-07-26
Payer: SUBSIDIZED

## 2017-07-26 PROCEDURE — 97140 MANUAL THERAPY 1/> REGIONS: CPT

## 2017-07-26 PROCEDURE — 97110 THERAPEUTIC EXERCISES: CPT

## 2017-07-26 NOTE — PROGRESS NOTES
PT DAILY TREATMENT NOTE     Patient Name: Renny Brock  Date:2017  : 1980  [x]  Patient  Verified  Payor: [place] / Plan: 1500 / Product Type: Medicaid /    In time: 4:30  Out time: 5:33  Total Treatment Time (min): 63  Visit #: 5 of     Treatment Area: Spinal stenosis, cervical region [M48.02]    SUBJECTIVE  Pain Level (0-10 scale): 6/10  Any medication changes, allergies to medications, adverse drug reactions, diagnosis change, or new procedure performed?: [x] No    [] Yes (see summary sheet for update)  Subjective functional status/changes:   [] No changes reported  Pt reports the tape got a little itchy at the top but not enough to feel he was having a reaction to it so he left it on. Pt noticed increased soreness of the muscle after last session and found himself bending his arm to take the pressure off his neck. Pt still gets burning sensations into his shoulders and is using a rice heating pad for muscle soreness.      OBJECTIVE    Modality rationale: decrease pain and increase tissue extensibility to improve the patients ability to improve ease of sleeping   Min Type Additional Details    [] Estim:  []Unatt       []IFC  []Premod                        []Other:  []w/ice   []w/heat  Position:  Location:    [] Estim: []Att    []TENS instruct  []NMES                    []Other:  []w/US   []w/ice   []w/heat  Position:  Location:    []  Traction: [] Cervical       []Lumbar                       [] Prone          []Supine                       []Intermittent   []Continuous Lbs:  [] before manual  [] after manual    []  Ultrasound: []Continuous   [] Pulsed                           []1MHz   []3MHz W/cm2:  Location:    []  Iontophoresis with dexamethasone         Location: [] Take home patch   [] In clinic   10 []  Ice     [x]  heat  []  Ice massage  []  Laser   []  Anodyne Position: seated  Location: B [facility]    []  Laser with stim  []  Other: Position:  Location:    []  Vasopneumatic Device Pressure:       [] lo [] med [] hi   Temperature: [] lo [] med [] hi   [x] Skin assessment post-treatment:  [x]intact []redness- no adverse reaction    []redness  adverse reaction:     33 min Therapeutic Exercise:  [x] See flow sheet :   Rationale: increase ROM and increase strength to improve the patients ability to sleep and centralize symptoms    20 min Manual Therapy:  SOR, gentle manual traction with R SB, c/s PAIVM mobs, c/s PA mobs   Rationale: decrease pain, increase ROM and increase tissue extensibility to centralize symptoms for ease of performing ADLs          With   [] TE   [] TA   [] neuro   [] other: Patient Education: [x] Review HEP    [] Progressed/Changed HEP based on:   [] positioning   [] body mechanics   [] transfers   [] heat/ice application    [] other:      Other Objective/Functional Measures:   Decrease in burning symptoms with manual traction  TTP suboccipitals but improved with manual  Redness noted when removing tape; educated on neosporin or lotion to calm irritation from tape  Added DNF exercise   Forward head and shoulder posture     Pain Level (0-10 scale) post treatment: 4/10    ASSESSMENT/Changes in Function: Pt making slow progress towards goals in therapy. Pt continues to have fluctuating levels of pain but does have some relief of pain in supine. Pt also gets some centralization of symptoms with gentle manual traction with R bias. Pt has forward head and shoulder posture contributing to c/s compression. Will continue to work on posture awareness and centralized symptoms for ease of sleeping and performing ADLs.      Patient will continue to benefit from skilled PT services to modify and progress therapeutic interventions, address functional mobility deficits, address ROM deficits, address strength deficits, analyze and address soft tissue restrictions, analyze and cue movement patterns, analyze and modify body mechanics/ergonomics, assess and modify postural abnormalities and instruct in home and community integration to attain remaining goals. Progress towards goals / Updated goals:  Short Term Goals: To be accomplished in 1 weeks:  1. Pt will be compliant with initial HEP to improve therapy outcomes Goal met. 7/13/17  Long Term Goals: To be accomplished in 6 weeks:  1. Pt will improve FOTO by 5 points in order to demonstrate functional improvement   2. Pt will improve cervical SB/rotation AROM by 5 degrees B in order to improve ease of head movements with ADLs  3. Pt will report <5/10 pain in order to improve sleeping ability. Progressing. Pt had reported pain level of 4-5/10 today. 7/20/17  4. Pt will report 50% improvement with radicular sx in order to improve QOL.  Not met continues to have burning pain in B shoulders and occasional pins/needles down L UE  (7/26/17)     PLAN  [x]  Upgrade activities as tolerated     [x]  Continue plan of care  []  Update interventions per flow sheet       []  Discharge due to:_  []  Other:_      Dagmar Arvizu PTA 7/26/2017  3:05 PM    Future Appointments  Date Time Provider Joseph Barber   7/26/2017 4:30 PM Dagmar Arvizu PTA [id number]   7/31/2017 1:00 PM Dagmar Arvizu PTA [facility]   8/2/2017 4:30 PM Dagmar Arvizu PTA [id number]   8/8/2017 3:30 PM Claudette Jensen, [facility]   8/10/2017 3:00 PM Claudette Jensen, [facility]   8/14/2017 2:30 PM Claudette Jensen, PT [facility]   8/16/2017 4:00 PM Dagmar Arvizu PTA [id number]

## 2017-07-31 ENCOUNTER — HOSPITAL ENCOUNTER (OUTPATIENT)
Dept: PHYSICAL THERAPY | Age: 37
Discharge: HOME | End: 2017-07-31
Payer: SUBSIDIZED

## 2017-07-31 PROCEDURE — 97110 THERAPEUTIC EXERCISES: CPT

## 2017-07-31 PROCEDURE — 97140 MANUAL THERAPY 1/> REGIONS: CPT

## 2017-07-31 NOTE — PROGRESS NOTES
PT DAILY TREATMENT NOTE     Patient Name: Jackie Farley  Date:2017  : 1980  [x]  Patient  Verified  Payor: [place] / Plan: 1500 / Product Type: Medicaid /    In time: 1:00  Out time: 2:00  Total Treatment Time (min): 60  Visit #: 6 of     Treatment Area: Spinal stenosis, cervical region [M48.02]    SUBJECTIVE  Pain Level (0-10 scale): 7/10  Any medication changes, allergies to medications, adverse drug reactions, diagnosis change, or new procedure performed?: [x] No    [] Yes (see summary sheet for update)  Subjective functional status/changes:   [] No changes reported  Pt reports the 2 days after the tape was taken off his pain increased in the muscle. Pt noted hard to lean his head to the L. Pt notes the burning he feels along his L shoulder is like a sunburn type feeling. Pt also reports cold intolerance and feeling that he has spasms and increased stiffness/pain when he is cold or the weather is cold. OBJECTIVE    35 min Therapeutic Exercise:  [x] See flow sheet :   Rationale: increase ROM and increase strength to improve the patients ability to centralize symptoms for ease of performing ADLs and sleeping    25 min Manual Therapy:  DTM to L levator scap and upper trap.  C/s glides PA and rotational, side glides   Rationale: decrease pain, increase ROM and increase tissue extensibility to centralize symptoms for ease of performing ADLs          With   [] TE   [] TA   [] neuro   [] other: Patient Education: [x] Review HEP    [] Progressed/Changed HEP based on:   [] positioning   [] body mechanics   [] transfers   [] heat/ice application    [] other:      Other Objective/Functional Measures:   Improved ease of L sidebending post manual  No increase in radicular symptoms during session  Reviewed performing SNAGs  At home to work on c/s mobility  L levator scap remains hypertonic   Educated on heat at home for sore muscles    Pain Level (0-10 scale) post treatment: 7/10    ASSESSMENT/Changes in Function: Pt continues to have fluctuating pain levels in his L shoulder and neck. Pt has decreased c/s mobility with increased L levator scap tightness contributing to pain. Pt had some improvement with tape but had an allergic reaction to the tape. Will continue working on c/s mobility and decreased tightness for ease of sleeping and performing ADLs. Patient will continue to benefit from skilled PT services to modify and progress therapeutic interventions, address functional mobility deficits, address ROM deficits, address strength deficits, analyze and address soft tissue restrictions, analyze and cue movement patterns, analyze and modify body mechanics/ergonomics, assess and modify postural abnormalities and instruct in home and community integration to attain remaining goals. Progress towards goals / Updated goals:  Short Term Goals: To be accomplished in 1 weeks:  1. Pt will be compliant with initial HEP to improve therapy outcomes Goal met. 7/13/17  Long Term Goals: To be accomplished in 6 weeks:  1. Pt will improve FOTO by 5 points in order to demonstrate functional improvement   2. Pt will improve cervical SB/rotation AROM by 5 degrees B in order to improve ease of head movements with ADLs  3. Pt will report <5/10 pain in order to improve sleeping ability. Progressing. Pt had reported pain level of 4-5/10 today. 7/20/17  4. Pt will report 50% improvement with radicular sx in order to improve QOL.  Not met continues to have burning pain in B shoulders and occasional pins/needles down L UE  (7/26/17)     PLAN  [x]  Upgrade activities as tolerated     [x]  Continue plan of care  []  Update interventions per flow sheet       []  Discharge due to:_  []  Other:_      Shar Carranza PTA 7/31/2017  9:57 AM    Future Appointments  Date Time Provider Joseph Elisabeth   7/31/2017 1:00 PM Shar Carranza PTA [id number]   8/2/2017 4:30 PM Shar Carranza PTA [id number] [facility]   8/8/2017 3:30 PM Usama Fair, PT [id number]   8/10/2017 3:00 PM Protem Fair, PT [id number] [facility]   8/14/2017 2:30 PM Usama Fair, PT [id number]   8/16/2017 4:00 PM Altamease Lance, PTA [id number] [facility]

## 2017-08-02 ENCOUNTER — HOSPITAL ENCOUNTER (OUTPATIENT)
Dept: PHYSICAL THERAPY | Age: 37
Discharge: HOME | End: 2017-08-02
Payer: MEDICAID

## 2017-08-02 PROCEDURE — 97110 THERAPEUTIC EXERCISES: CPT

## 2017-08-02 PROCEDURE — 97140 MANUAL THERAPY 1/> REGIONS: CPT

## 2017-08-02 NOTE — PROGRESS NOTES
PT DAILY TREATMENT NOTE     Patient Name: Nhi Bui  Date:2017  : 1980  [x]  Patient  Verified  Payor: [place] / Plan: 1500  / Product Type: Medicaid /    In time:4:32  Out time: 5:15  Total Treatment Time (min): 43  Visit #: 7 of     Treatment Area: Spinal stenosis, cervical region [M48.02]    SUBJECTIVE  Pain Level (0-10 scale): 7/10  Any medication changes, allergies to medications, adverse drug reactions, diagnosis change, or new procedure performed?: [x] No    [] Yes (see summary sheet for update)  Subjective functional status/changes:   [] No changes reported  Pt reports emotion of losing his grandmother is blocking a lot of his pain today. Pt states he is noticing more feeling and less burning to his shoulders recently but still does have numbness. Pt feels therapy is really helping and that he is getting and maintaining the motion in his neck. Pt notes he may have slept wrong causing a little more pain today. He also notes he is usually pretty sore after therapy but understands it is to be expected and he is able to manage it with a heating pad at home.      OBJECTIVE    10 minutes MH to B UT at end of session to decrease post exercise and manual soreness for ease of sleeping and performing ADLs    23 min Therapeutic Exercise:  [x] See flow sheet :   Rationale: increase ROM and increase strength to improve the patients ability to increase ease of sleeping and centralize symptoms with improved posture    10 min Manual Therapy:  PA mobs to c/s; side glides to c/s; noted C6 rotated to the L (provided gentle distraction and then performed light distraction with pt performing R c/s rotation)   Rationale: decrease pain, increase ROM and increase tissue extensibility to improve ease of turning his head with decreased          With   [] TE   [] TA   [] neuro   [] other: Patient Education: [x] Review HEP    [] Progressed/Changed HEP based on:   [] positioning [] body mechanics   [] transfers   [] heat/ice application    [] other:      Other Objective/Functional Measures:   C/s SB AROM: L 23 degrees R 27 degrees (before manual)  C/s rotation AROM: L 65 degrees R 47 degrees (before manual)  Pt able to feel gentle pop with gentle traction and R c/s rotation; resolved rotated C6 vertebrae and R rotation increased to 63 degrees without pain just gentle stretching felt  Slight increase in soreness by end of session but no increase in radicular symptoms  Very talkative during session making progression through exercises difficult     Pain Level (0-10 scale) post treatment: 7.5/10    ASSESSMENT/Changes in Function: Pt making steady progress towards goals with improvement in c/s mobility but still with fluctuating pain levels. Pt starting to notice some decrease in radicular symptoms with each session indicating decreasing nerve compression and improving c/s stability. Pt still has difficulty sleeping and performing ADLs but also has other l/s and hip pain contributing to difficulties. Will continue progressing c/s mobility and muscle stability for improved posture and centralization of symptoms. Patient will continue to benefit from skilled PT services to modify and progress therapeutic interventions, address functional mobility deficits, address ROM deficits, address strength deficits, analyze and address soft tissue restrictions, analyze and cue movement patterns, analyze and modify body mechanics/ergonomics, assess and modify postural abnormalities, address imbalance/dizziness and instruct in home and community integration to attain remaining goals. Progress towards goals / Updated goals:  Short Term Goals: To be accomplished in 1 weeks:  1. Pt will be compliant with initial HEP to improve therapy outcomes Goal met. 7/13/17  Long Term Goals: To be accomplished in 6 weeks:  1. Pt will improve FOTO by 5 points in order to demonstrate functional improvement   2. Pt will improve cervical SB/rotation AROM by 5 degrees B in order to improve ease of head movements with ADLs. MET:  L 23 degrees R 27 degrees ; C/s rotation AROM: L 65 degrees R: 63 degrees   3. Pt will report <5/10 pain in order to improve sleeping ability. Progressing. Pt had reported pain level of 4-5/10 today. 7/20/17  4. Pt will report 50% improvement with radicular sx in order to improve QOL.  Not met continues to have burning pain in B shoulders and occasional pins/needles down L UE  (7/26/17)     PLAN  [x]  Upgrade activities as tolerated     [x]  Continue plan of care  []  Update interventions per flow sheet       []  Discharge due to:_  []  Other:_      Elver Braden PTA 8/2/2017  4:40 PM    Future Appointments  Date Time Provider Joseph Barber   8/8/2017 2:30 PM Yaz Kern, PT [id number] [facility]   8/8/2017 3:30 PM Yaz Kern, PT [id number] [facility]   8/10/2017 3:00 PM [facility] Concha, PT [id number] [facility]   8/14/2017 2:30 PM Yaz Kern, PT [id number]   8/16/2017 4:00 PM Elver Braden PTA [id number] [facility]

## 2017-08-08 ENCOUNTER — HOSPITAL ENCOUNTER (OUTPATIENT)
Dept: PHYSICAL THERAPY | Age: 37
Discharge: HOME | End: 2017-08-08
Payer: MEDICAID

## 2017-08-08 PROCEDURE — 97110 THERAPEUTIC EXERCISES: CPT

## 2017-08-08 PROCEDURE — 97162 PT EVAL MOD COMPLEX 30 MIN: CPT

## 2017-08-08 NOTE — PROGRESS NOTES
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 Assessment/ key information: Pt is a 44 yo M who presents with a long hx of LBP with progressive worsening in his mid to late 25s. He also reports L hip pain and has congenital hip dysplasia. Subjective reports of pain increased with walking a couple minutes, standing a couple minutes, and lifting and relieved with supine and L sidelying. He had therapy in the past which helped slightly until they pushed him too far. He also had facet block injections which provided relief for 1-2 days. Pt had xrays previously but does not remember the results. Pt presents with moderate tenderness B SIJ, all lumbar vertebrae and surrounding musculature, with severe TTP at \"soft spot\" between L1-L2 spinous processes. He demonstrates decreased strength of L hip and presents with pelvic obliquity of L upslip and L PI. After correction of pelvic malalignment, pt presented with 0.5\" LLD likely contributing to scoliotic changes and pain.   Pt will benefit from skilled PT to address flexibility and core/hip stability to reduce pain and improved WB tolerance for improved QOL. Evaluation Complexity History HIGH Complexity :3+ comorbidities / personal factors will impact the outcome/ POC ; Examination HIGH Complexity : 4+ Standardized tests and measures addressing body structure, function, activity limitation and / or participation in recreation  ;Presentation MEDIUM Complexity : Evolving with changing characteristics  ; Clinical Decision Making MEDIUM Complexity : FOTO score of 26-74  Overall Complexity Rating: MEDIUM  Problem List: pain affecting function, decrease ROM, decrease strength, impaired gait/ balance, decrease ADL/ functional abilitiies, decrease activity tolerance and decrease flexibility/ joint mobility   Treatment Plan may include any combination of the following: Therapeutic exercise, Therapeutic activities, Neuromuscular re-education, Physical agent/modality, Gait/balance training, Manual therapy, Patient education, Self Care training, Functional mobility training, Home safety training and Stair training  Patient / Family readiness to learn indicated by: asking questions, trying to perform skills and interest  Persons(s) to be included in education: patient (P) and family support person (FSP);list friend/caregiver   Barriers to Learning/Limitations: None  Patient Goal (s): lowered pain and perhaps increased mobility  Patient Self Reported Health Status: poor  Rehabilitation Potential: fair    Short Term Goals: To be accomplished in 1 weeks:  1. Pt will be compliant with initial HEP to improve therapy outcomes   Long Term Goals: To be accomplished in 6 weeks:  1. Pt will improve FOTO by 14 points in order to demonstrate functional improvement  2. Pt will improve L hip strength to 4+/5 in order to improve stability for increased weight-bearing tolerance   3.  Pt will improve standing/ambulating tolerance to >/= 5 minutes in order to improve ease of household tasks and community engagement   4. Pt will report <6/10 average pain in order to improve QOL     Frequency / Duration: Patient to be seen 2 times per week for 6 weeks. Patient/ CarPatient/ Caregiver education and instruction: Diagnosis, prognosis, activity modification and exercises   [x]  Plan of care has been reviewed with ABIGAIL Hernandez, PT 8/8/2017 2:52 PM    ________________________________________________________________________    I certify that the above Therapy Services are being furnished while the patient is under my care. I agree with the treatment plan and certify that this therapy is necessary.     Physician's Signature:____________________  Date:____________Time: _________    Please sign and return to [facility]  [address] [facility]  [phone] fax

## 2017-08-08 NOTE — PROGRESS NOTES
PT DAILY TREATMENT NOTE/LUMBAR EVAL 3-16    Patient Name: Benito Golden  Date:2017  : 1980  [x]  Patient  Verified  Payor: [place] / Plan:     / Product Type: Medicaid /    In time:2:54  Out time:3:35  Total Treatment Time (min): 41  Visit #: 1 of12    Treatment Area: Muscle Spasms of BLE (S34.013), Lumbar Disc Disease (M50.9), Secondary Scoliosis (G62.9)    SUBJECTIVE  Pain Level (0-10 scale): 9/10  []constant []intermittent []improving [x]worsening []no change since onset    Any medication changes, allergies to medications, adverse drug reactions, diagnosis change, or new procedure performed?: [x] No    [] Yes (see summary sheet for update)  Subjective functional status/changes:     Long hx of LBP and L hip pain. Has hip dysplasia on L. Multiple near falls but has never fallen to the floor. He has had near LOB d/t hip pain and because \"the whole leg will just stop functioning\".        Barriers: []pain [x]financial []time []transportation []other  Substance use: []Alcohol []Tobacco [x]other: none  FABQ Score: [x]low []elevate - based on FOTO   Cognition: A & O x 4    Other:    OBJECTIVE/EXAMINATION      33 min [x]Eval                  []Re-Eval       5 min Therapeutic Activity:  []  See flow sheet :   Rationale: Educated patient regarding findings of evaluation, purpose of leg lengthening exercise and MET, plan to continue to increase heel lift as tolerated, heat use 10-15 minutes, purpose of therapy, and therapy plan in order to ensure pt understanding/compliance with therapy     3 min Manual MET with shotgun to correct L PI  Rationale to reduce pain and eliminate functional LLD to assess structural LLD for trial of heel lift and increased WB tolerance             With   [] TE   [] TA   [] neuro   [] other: Patient Education: [x] Review HEP    [] Progressed/Changed HEP based on:   [] positioning   [] body mechanics   [] transfers   [] heat/ice application    [] other: Other Objective/Functional Measures:     /78 taken manually prior to therapy     Physical Therapy Evaluation - Lumbar Spine ([facility])    SUBJECTIVE     General Health:  Red Flags Indicated?  [] Yes    [x] No    Past History/Treatments:     Diagnostic Tests: [] Lab work [x] X-rays    [] CT [] MRI     [] Other:  Results: results unknown - pt will look for a copy at home and bring it in      OBJECTIVE  Posture:  Lateral Shift: [] R    [] L     [] +  [] -  Kyphosis: [] Increased [] Decreased   []  WNL  Lordosis:  [] Increased [x] Decreased   [] WNL  Pelvic symmetry: [] WNL    [x] Other: L upslip, L PI    Gait:  [] Normal     [x] Abnormal: ambulates with decreased kareem with SPC, decreased weight-shift to R     Active Movements: [] N/A   [] Too acute   [] Other:  ROM AROM (dgrs) % PROM Comments:pain, area   Forward flexion 40-60 60     Extension 20-30 21     SB right 20-30      SB left 20-30      Rotation right 5-10      Rotation left 5-10        Hip IR R 22 dgrs, L 20 dgrs   Hip ER R 52 dgrs, L 43 dgrs     Palpation  [] Min  [] Mod  [x] Severe    Location: TTP at \"soft spot\" between L1-L2  [] Min  [x] Mod  [] Severe    Location: TTP lumbar vertebrae and surrounding musculature, B SIJ    Strength   L(0-5) R (0-5) N/T   Hip Flexion (L1,2) 4 4 []   Knee Extension (L3,4) 4+ 4+ []   Ankle Dorsiflexion (L4)   []   Great Toe Extension (L5)   []   Ankle Plantarflexion (S1)   []   Knee Flexion (S1,2) 4 4+ []   Upper Abdominals   []   Lower Abdominals   []   Hip IR 4- 4 []   Hip ER 4 4+ []   Hip Abd   []   Hip Ext    []     Special Tests    Sacroilliac:  Gaenslen's: [] R    [] L    [] +    [] -     Compression: [] +    [x] -     Gapping:  [] +    [x] -     Thigh Thrust: [] R    [] L    [] +    [] -     Leg Length: [] +    [] -   Position:    Crests: L elevated    ASIS: L elevated and remained elevated after correction of upslip         Global Muscular Weakness:  Abdominals: weak  Quadratus Lumborum: weak  Paraspinals: weak  Other:    Other tests/comments:    L upslip corrected with leg lengthening exercise  L PI corrected with MET with shotgun    LLD after correction of pelvic malalignment  L 37.5\", R 37.0\"    Gave pt 1/8\" heel lift to trial, instructed to keep remaining levels as therapy will instruct him to increase height pending clinical judgement   Instructed to remove heel lift if increased pain anywhere along kinetic chain  No increased pain with therapy    Pt very talkative during evaluation, HEP not initiated d/t time constraint     Pain Level (0-10 scale) post treatment: 8-9/10    ASSESSMENT/Changes in Function: See POC    Patient will continue to benefit from skilled PT services to modify and progress therapeutic interventions, address functional mobility deficits, address ROM deficits, address strength deficits, analyze and address soft tissue restrictions, analyze and cue movement patterns, assess and modify postural abnormalities, address imbalance/dizziness and instruct in home and community integration to attain remaining goals.      [x]  See Plan of Care  []  See progress note/recertification  []  See Discharge Summary         Progress towards goals / Updated goals:  See POC    PLAN  [x]  Upgrade activities as tolerated     []  Continue plan of care  [x]  Update interventions per flow sheet       []  Discharge due to:_  []  Other:_      Edgar Larose, PT 8/8/2017  2:52 PM

## 2017-08-08 NOTE — PROGRESS NOTES
PT DAILY TREATMENT NOTE     Patient Name: Emre Gannon  Date:2017  : 1980  [x]  Patient  Verified  Payor: [place] / Plan: 1500 / Product Type: Medicaid /    In time:3:35  Out time: 4:30  Total Treatment Time (min): 55  Visit #: 8     Treatment Area: Spinal stenosis, cervical region [M48.02]    SUBJECTIVE  Pain Level (0-10 scale): 8/10  Any medication changes, allergies to medications, adverse drug reactions, diagnosis change, or new procedure performed?: [x] No    [] Yes (see summary sheet for update)  Subjective functional status/changes:   [] No changes reported  Pt reports that he used to not have any feeling in his shoulders, but now he is getting some sensation back in the middle of his shoulder blades. Pt rports that he has had less tingling but more burning pain in his arms.       OBJECTIVE    Modality rationale: decrease pain and increase tissue extensibility to improve the patients ability to tolerate daily tasks    Min Type Additional Details    [] Estim:  []Unatt       []IFC  []Premod                        []Other:  []w/ice   []w/heat  Position:  Location:    [] Estim: []Att    []TENS instruct  []NMES                    []Other:  []w/US   []w/ice   []w/heat  Position:  Location:    []  Traction: [] Cervical       []Lumbar                       [] Prone          []Supine                       []Intermittent   []Continuous Lbs:  [] before manual  [] after manual    []  Ultrasound: []Continuous   [] Pulsed                           []1MHz   []3MHz W/cm2:  Location:    []  Iontophoresis with dexamethasone         Location: [] Take home patch   [] In clinic   10 []  Ice     [x]  heat  []  Ice massage  []  Laser   []  Anodyne Position: seated  Location: B UT    []  Laser with stim  []  Other:  Position:  Location:    []  Vasopneumatic Device Pressure:       [] lo [] med [] hi   Temperature: [] lo [] med [] hi   [x] Skin assessment post-treatment: [x]intact []redness- no adverse reaction    []redness  adverse reaction:     45 min Therapeutic Exercise:  [x] See flow sheet :   Rationale: increase ROM and increase strength to improve the patients ability to reduce pain and improve ease of mobility with daily tasks             With   [] TE   [] TA   [] neuro   [] other: Patient Education: [x] Review HEP    [] Progressed/Changed HEP based on:   [] positioning   [] body mechanics   [] transfers   [] heat/ice application    [x] other: Discussed pt's progress with therapy, discussed plan to DC manual as pt demonstrates improved mobility, discussed need to progress strength and importance of upright posture for reduced pain levels       Other Objective/Functional Measures:     Challenged with TB rows/ext with blue TB, requested to switch to green TB after completion of rows and 1/2 of extensions   LBP reported with TB extension, increased pain subsided immediately upon ceasing  No increased pain following therapy     FOTO 62     Pain Level (0-10 scale) post treatment: 5.5/10    ASSESSMENT/Changes in Function: See Progress Note    Patient will continue to benefit from skilled PT services to modify and progress therapeutic interventions, address ROM deficits, address strength deficits, analyze and address soft tissue restrictions, analyze and cue movement patterns, assess and modify postural abnormalities and instruct in home and community integration to attain remaining goals. []  See Plan of Care  [x]  See progress note/recertification  []  See Discharge Summary         Progress towards goals / Updated goals:  Short Term Goals: To be accomplished in 1 weeks:  1. Pt will be compliant with initial HEP to improve therapy outcomes Goal met. 7/13/17  Long Term Goals: To be accomplished in 6 weeks:  1. Pt will improve FOTO by 5 points in order to demonstrate functional improvement Goal met. Improved 8 points 8/9/17  2.  Pt will improve cervical SB/rotation AROM by 5 degrees B in order to improve ease of head movements with ADLs. MET:  L 23 degrees R 27 degrees ; C/s rotation AROM: L 65 degrees R: 63 degrees   3. Pt will report <5/10 pain in order to improve sleeping ability. Not met. Max pain 8-8.5/10, average pain 6/10 (which is improved), least pain is almost no pain which is rare 8/8/17  4. Pt will report 50% improvement with radicular sx in order to improve QOL. Progressing.   15-20% improvement with radicular sx.  8/8/17    PLAN  []  Upgrade activities as tolerated     [x]  Continue plan of care  []  Update interventions per flow sheet       []  Discharge due to:_  []  Other:_      Sylvia Lambert PT 8/8/2017  4:04 PM    Future Appointments  Date Time Provider Joseph Barber   8/10/2017 3:00 PM Sylvia Lambert PT [facility]   8/14/2017 2:30 PM Sylvia Lambert PT [facility]   8/16/2017 4:00 PM Maryam Tabor PTA [facility]

## 2017-08-09 NOTE — PROGRESS NOTES
[facility] Maciej Leandra  [address] [facility]  [phone] fax    Physical Therapy Progress Note  Patient name: [facility] of Care:17   Referral source: Hien Earl NP : 1980   Medical/Treatment Diagnosis: Spinal stenosis, cervical region [M48.02] Onset Date:7-8 years ago with progressive decline      Prior Hospitalization: see medical history Provider#: [id number]   Medications: Verified on Patient Summary List     Comorbidities: fetal alcohol syndrome, asthma, scoliosis, weight change > 10 lbs, arthritis, depression, tobacco use (1 pack every 3 days), bipolar   Prior Level of Function: lives with friends in a 1 story home with HR at entry steps, computer for recreational time     Visits from [place] of Care: 8    Missed Visits: 0    Established Goals:         Excellent           Good         Limited           None  [x] Increased ROM   []  [x]  []  []  [] Increased Strength  []  []  []  []  [] Increased Mobility  []  []  []  []   [x] Decreased Pain   []  [x]  []  []  [] Decreased Swelling  []  []  []  []    Key Functional Changes: decreasing pain levels, centralizing radicular sx, continues to have poor/forward posture     Updated Goals: to be achieved in 4 weeks:  1. Pt will report <5/10 pain in order to improve sleeping ability. 2. Pt will report 50% improvement with radicular sx in order to improve QOL. 3. Pt will demonstrate understanding/compliance with final HEP for continued progression independently upon DC.      ASSESSMENT/RECOMMENDATIONS:  Pt is making steady progress in therapy, meeting 3/5 initial goals and progressing with pain and centralization of radicular sx goals. Pt now reports average pain of 6/10 and 15-20% improvement with radicular sx. Poor, forward posture is likely contributing to continued pain/radicular sx, and pt continues to be educated on importance of upright posture.   Pt will benefit from continued skilled PT to address remaining strength, flexibility, and postural deficits for reduced pain levels and improved QOL. [x]Continue therapy per initial plan/protocol at a frequency of  2-3 x per week for 4 weeks  []Continue therapy with the following recommended changes:_____________________      _____________________________________________________________________  []Discontinue therapy progressing towards or have reached established goals  []Discontinue therapy due to lack of appreciable progress towards goals  []Discontinue therapy due to lack of attendance or compliance  []Await Physician's recommendations/decisions regarding therapy  []Other:________________________________________________________________    Thank you for this referral.   Luis Alberto Suárez, PT 8/9/2017 1:05 PM    NOTE TO PHYSICIAN:  Dorene Tapia 172   [facility] [phone]  If you are unable to process this request in 24 hours please contact our office: [phone] I have read the above report and request that my patient continue as recommended. ? I have read the above report and request that my patient continue therapy with the following changes/special instructions:____________________________________  ? I have read the above report and request that my patient be discharged from therapy.     Physicians signature: ______________________________Date: ______Time:______

## 2017-08-10 ENCOUNTER — APPOINTMENT (OUTPATIENT)
Dept: PHYSICAL THERAPY | Age: 37
End: 2017-08-10
Payer: MEDICAID

## 2017-08-11 ENCOUNTER — HOSPITAL ENCOUNTER (OUTPATIENT)
Dept: PHYSICAL THERAPY | Age: 37
Discharge: HOME | End: 2017-08-11
Payer: MEDICAID

## 2017-08-11 PROCEDURE — 97110 THERAPEUTIC EXERCISES: CPT

## 2017-08-11 PROCEDURE — 97140 MANUAL THERAPY 1/> REGIONS: CPT

## 2017-08-11 PROCEDURE — 97112 NEUROMUSCULAR REEDUCATION: CPT

## 2017-08-11 NOTE — PROGRESS NOTES
PT DAILY TREATMENT NOTE 3-16    Patient Name: Dean Glover  Date:2017  : 1980  [x]  Patient  Verified  Payor: [place] / Plan: 1500 / Product Type: Medicaid /    In time:1:11  Out time:1:56  Total Treatment Time (min): 45  Visit #: 2 of 12    Treatment Area: Hip pain [M25.559]  Low back pain [M54.5]    SUBJECTIVE  Pain Level (0-10 scale): 4-hip. 8.5-9/10-l/s. Any medication changes, allergies to medications, adverse drug reactions, diagnosis change, or new procedure performed?: [x] No    [] Yes (see summary sheet for update)  Subjective functional status/changes:   [] No changes reported  \"My hip is actually good today. \"    OBJECTIVE  Modality rationale: decrease pain and increase tissue extensibility to improve the patients ability to ease ADL tolerance   Min Type Additional Details    [] Estim:  []Unatt       []IFC  []Premod                        []Other:  []w/ice   []w/heat  Position:  Location:    [] Estim: []Att    []TENS instruct  []NMES                    []Other:  []w/US   []w/ice   []w/heat  Position:  Location:    []  Traction: [] Cervical       []Lumbar                       [] Prone          []Supine                       []Intermittent   []Continuous Lbs:  [] before manual  [] after manual    []  Ultrasound: []Continuous   [] Pulsed                           []1MHz   []3MHz Location:  W/cm2:    []  Iontophoresis with dexamethasone         Location: [] Take home patch   [] In clinic   10 []  Ice     [x]  heat  []  Ice massage  []  Laser   []  Anodyne Position:seated  Location: l/s    []  Laser with stim  []  Other: Position:  Location:    []  Vasopneumatic Device Pressure:       [] lo [] med [] hi   Temperature: [] lo [] med [] hi   [x] Skin assessment post-treatment:  [x]intact []redness- no adverse reaction    []redness  adverse reaction:     10 min Therapeutic Exercise:  [x] See flow sheet :   Rationale: increase ROM, increase strength and improve coordination to improve the patients ability to increase ease with ADLs    17 min Neuromuscular Re-education:  [x]  See flow sheet : core stabilization activities   Rationale: increase strength, improve coordination and increase proprioception  to improve the patients ability to improve core engagement and reduce stress on lumbar spine     8 min Manual Therapy:  MET x2 to correct right AI/left PI   Rationale: decrease pain, increase ROM and increase tissue extensibility to ease ADL tolerance           With   [] TE   [] TA   [] neuro   [] other: Patient Education: [x] Review HEP    [] Progressed/Changed HEP based on:   [] positioning   [] body mechanics   [] transfers   [] heat/ice application    [] other:      Other Objective/Functional Measures: first follow up session--cues throughout for correct form and sequencing  Patient with neutral hip alignment post MET x2      Pain Level (0-10 scale) post treatment: 4    ASSESSMENT/Changes in Function: Initiated POC per flowsheet. Patient with fair TA engagement, requires cueing throughout to avoid Valsalva maneuver, although no rectus bulge noted. Patient with intermittent c/o \"muscle pain\". Patient with no increase in pain post session/modalities. Will continue to focus on increasing hip/core strength for ease with prolonged standing. Patient will continue to benefit from skilled PT services to modify and progress therapeutic interventions, address functional mobility deficits, address ROM deficits, address strength deficits, analyze and address soft tissue restrictions, analyze and cue movement patterns, analyze and modify body mechanics/ergonomics and assess and modify postural abnormalities to attain remaining goals. []  See Plan of Care  []  See progress note/recertification  []  See Discharge Summary         Short Term Goals: To be accomplished in 1 weeks:  1. Pt will be compliant with initial HEP to improve therapy outcomes. -progressing, patient reports doing some of the HEP, states, \"it depends on my pain\" (8/11/2017)  Long Term Goals: To be accomplished in 6 weeks:  1. Pt will improve FOTO by 14 points in order to demonstrate functional improvement  2. Pt will improve L hip strength to 4+/5 in order to improve stability for increased weight-bearing tolerance   3. Pt will improve standing/ambulating tolerance to >/= 5 minutes in order to improve ease of household tasks and community engagement   4.  Pt will report <6/10 average pain in order to improve QOL     PLAN  []  Upgrade activities as tolerated     [x]  Continue plan of care  []  Update interventions per flow sheet       []  Discharge due to:_  []  Other:_      Matthias Gray 8/11/2017  1:35 PM    Future Appointments  Date Time Provider Joseph Barber   8/14/2017 2:30 PM Lawrence Gonzalez, PT [email or web address] [facility]   8/14/2017 3:30 PM Lawrence Gonzalez, PT [email or web address] [facility]   8/16/2017 4:00 PM Lizette Chavez, PTA [id number] [facility]   8/16/2017 4:30 PM Lizette Chavez, PTA MMCPTPB [facility]   8/22/2017 3:00 PM Lizette Chavez, PTA MMCPTPB [facility]   8/22/2017 3:30 PM Lawrence Gonzalez, PT [id number] [facility]   8/24/2017 2:30 PM Lawrence Gonzalez, PT MMCPTPB [facility]   8/24/2017 3:00 PM Lawrence Gonzalez, PT ETACHNI [facility]   8/29/2017 3:00 PM Lizette Chavez, PTA IGNPIQJ [facility]   8/29/2017 3:30 PM Lizette Chavez, PTA [email or web address] [facility]   8/31/2017 3:30 PM Lizette Chavez PTA [email or web address] [facility]   8/31/2017 4:00 PM Lizette Chavez PTA [email or web address] [facility]

## 2017-08-11 NOTE — PROGRESS NOTES
PT DAILY TREATMENT NOTE 3-16    Patient Name: Becki Negron  Date:2017  : 1980  [x]  Patient  Verified  Payor: [place] / Plan: 1500 / Product Type: Medicaid /    In time:12:30  Out time:1:10  Total Treatment Time (min): 40  Visit #:     Treatment Area: Spinal stenosis, cervical region [M48.02]    SUBJECTIVE  Pain Level (0-10 scale): 4/10  Any medication changes, allergies to medications, adverse drug reactions, diagnosis change, or new procedure performed?: [x] No    [] Yes (see summary sheet for update)  Subjective functional status/changes:   [] No changes reported  Patient reports that his tingling has returned.      OBJECTIVE  Modality rationale: decrease pain and increase tissue extensibility to improve the patients ability to ease ADL tolerance   Min Type Additional Details    [] Estim:  []Unatt       []IFC  []Premod                        []Other:  []w/ice   []w/heat  Position:  Location:    [] Estim: []Att    []TENS instruct  []NMES                    []Other:  []w/US   []w/ice   []w/heat  Position:  Location:    []  Traction: [] Cervical       []Lumbar                       [] Prone          []Supine                       []Intermittent   []Continuous Lbs:  [] before manual  [] after manual    []  Ultrasound: []Continuous   [] Pulsed                           []1MHz   []3MHz Location:  W/cm2:    []  Iontophoresis with dexamethasone         Location: [] Take home patch   [] In clinic   10 []  Ice     [x]  heat  []  Ice massage  []  Laser   []  Anodyne Position: seated  Location: c/s (with l/s)    []  Laser with stim  []  Other: Position:  Location:    []  Vasopneumatic Device Pressure:       [] lo [] med [] hi   Temperature: [] lo [] med [] hi   [x] Skin assessment post-treatment:  [x]intact []redness- no adverse reaction    []redness  adverse reaction:       30 min Therapeutic Exercise:  [x] See flow sheet :   Rationale: increase ROM, increase strength and improve coordination to improve the patients ability to increase ease with ADLs           With   [] TE   [] TA   [] neuro   [] other: Patient Education: [x] Review HEP    [] Progressed/Changed HEP based on:   [] positioning   [] body mechanics   [] transfers   [] heat/ice application    [] other:      Other Objective/Functional Measures:   Therapy tech instructs patient through doorway stretch--patient later informing PTA that he had tingling during the stretch  Added chin tucks  Good wall letter \"W\" form    Pain Level (0-10 scale) post treatment: 0    ASSESSMENT/Changes in Function: Patient with increased tingling symptoms today. Therapist educates patient to inform therapist when he is experiencing tingling. Patient with increased tingling with doorway stretch, which he performs with therapy tech, and informs therapist that he had some tingling after swiss ball depression reps. Will continue to monitor peripheralization symptoms. Patient will continue to benefit from skilled PT services to modify and progress therapeutic interventions, address functional mobility deficits, address ROM deficits, address strength deficits, analyze and address soft tissue restrictions, analyze and cue movement patterns, analyze and modify body mechanics/ergonomics and assess and modify postural abnormalities to attain remaining goals. []  See Plan of Care  []  See progress note/recertification  []  See Discharge Summary         Updated Goals: to be achieved in 4 weeks:  1. Pt will report <5/10 pain in order to improve sleeping ability. 2. Pt will report 50% improvement with radicular sx in order to improve QOL. 3. Pt will demonstrate understanding/compliance with final HEP for continued progression independently upon DC.       PLAN  []  Upgrade activities as tolerated     [x]  Continue plan of care  []  Update interventions per flow sheet       []  Discharge due to:_  []  Other:_      Valentina Cordova Katharina Loco 8/11/2017  1:24 PM    Future Appointments  Date Time Provider Joseph Lopezi   8/14/2017 2:30 PM Halie Stacey, PT [id number] [facility]   8/14/2017 3:30 PM Halie Stacey, PT [email or web address] [facility]   8/16/2017 4:00 PM Jefferson Horstherese, PTA [id number] [facility]   8/16/2017 4:30 PM Ronny Childress, PTA [id number] [facility]   8/22/2017 3:00 PM Jefferson Horstherese, PTA [email or web address] [facility]   8/22/2017 3:30 PM Halie Stacey, PT [id number] [facility]   8/24/2017 2:30 PM Halie Stacey, PT [email or web address] [facility]   8/24/2017 3:00 PM Halie Stacey, PT [id number] [facility]   8/29/2017 3:00 PM Ronny Childress, PTA [email or web address] [facility]   8/29/2017 3:30 PM Ronny Childress, PTA [id number] [facility]   8/31/2017 3:30 PM Ronny Childress, PTA [email or web address] [facility]   8/31/2017 4:00 PM Ronny Childress, PTA [email or web address] [facility]

## 2017-08-14 ENCOUNTER — HOSPITAL ENCOUNTER (OUTPATIENT)
Dept: PHYSICAL THERAPY | Age: 37
End: 2017-08-14
Payer: MEDICAID

## 2017-08-16 ENCOUNTER — HOSPITAL ENCOUNTER (OUTPATIENT)
Dept: PHYSICAL THERAPY | Age: 37
Discharge: HOME | End: 2017-08-16
Payer: MEDICAID

## 2017-08-16 PROCEDURE — 97140 MANUAL THERAPY 1/> REGIONS: CPT

## 2017-08-16 PROCEDURE — 97110 THERAPEUTIC EXERCISES: CPT

## 2017-08-16 NOTE — PROGRESS NOTES
PT DAILY TREATMENT NOTE     Patient Name: Anne Castorena  Date:2017  : 1980  [x]  Patient  Verified  Payor: [place] / Plan: 1500 / Product Type: Medicaid /    In time: 4:05  Out time: 4:30  Total Treatment Time (min): 25  Visit #: 3 of 12    Treatment Area: Spinal stenosis, cervical region [M48.02]    SUBJECTIVE  Pain Level (0-10 scale): 5-6/10  Any medication changes, allergies to medications, adverse drug reactions, diagnosis change, or new procedure performed?: [x] No    [] Yes (see summary sheet for update)  Subjective functional status/changes:   [] No changes reported  Pt reports his back really bothered him after last session. Pt states he is getting more used to the heel lift but it slides some due to being too small. Pt notes pain in weightbearing in his L hip that is deep and thinks he may need a hip replacement. OBJECTIVE    17 min Therapeutic Exercise:  [x] See flow sheet :   Rationale: increase ROM and increase strength to improve the patients ability to ambulate and transfer with decreased back/hip pain    8 min Manual Therapy:  Leg lengthening to correct L upslip followed by shotgun technique   Rationale: decrease pain, increase ROM and increase tissue extensibility to improve ease of ambulation          With   [] TE   [] TA   [] neuro   [] other: Patient Education: [x] Review HEP    [] Progressed/Changed HEP based on:   [] positioning   [] body mechanics   [] transfers   [] heat/ice application    [] other:      Other Objective/Functional Measures:    Audible pubic pop with shotgun technique post manual  No rotation noted once upslip corrected  Do not have in more heel lifts yet to further adjust LLD  Cues for exhalation with exertion for exercises to prevent elsie conchita  Cues for gentle TA contraction versus forceful to prevent flaring c/s musculature  Challenged with DKTC and HS stretch on L due to hip joint dysplasia    Pain Level (0-10 scale) post treatment: 3-4/10    ASSESSMENT/Changes in Function: Pt continues to have pelvic obliquity due to not fully corrected LLD to slowly progress to prevent flare up of pain. Pt with good TA contraction but needs work on endurance for using throughout the day. Pt continues to have pain in flexion positions of the L hip due to dysplasia but is willing to try all exercises to decrease pain. Will continue working on core/hip stability for decreased pain with ambulation and transfers. Patient will continue to benefit from skilled PT services to modify and progress therapeutic interventions, address functional mobility deficits, address ROM deficits, address strength deficits, analyze and address soft tissue restrictions, analyze and cue movement patterns, analyze and modify body mechanics/ergonomics, assess and modify postural abnormalities, address imbalance/dizziness and instruct in home and community integration to attain remaining goals. Progress towards goals / Updated goals:  Short Term Goals: To be accomplished in 1 weeks:  1. Pt will be compliant with initial HEP to improve therapy outcomes. -progressing, patient reports doing some of the HEP, states, \"it depends on my pain\" (8/11/2017)  Long Term Goals: To be accomplished in 6 weeks:  1. Pt will improve FOTO by 14 points in order to demonstrate functional improvement  2. Pt will improve L hip strength to 4+/5 in order to improve stability for increased weight-bearing tolerance   3. Pt will improve standing/ambulating tolerance to >/= 5 minutes in order to improve ease of household tasks and community engagement   4.  Pt will report <6/10 average pain in order to improve QOL    PLAN  []  Upgrade activities as tolerated     []  Continue plan of care  []  Update interventions per flow sheet       []  Discharge due to:_  []  Other:_      Lizette Chavez PTA 8/16/2017  4:04 PM    Future Appointments  Date Time Provider Joseph Barber   8/16/2017 4:30 PM Amado Rodriguez, PTA [email or web address] [facility]   8/22/2017 3:00 PM Amado Rodriguez, PTA MMCPTPB [facility]   8/22/2017 3:30 PM Amado Rodriguez, PTA MMCPTPB [facility]   8/24/2017 2:30 PM Moreno Ross, PT [id number] [facility]   8/24/2017 3:00 PM Moreno Ross, PT MMCPTPB [facility]   8/29/2017 3:00 PM Amado Rodriguez, PTA MMCPTPB [facility]   8/29/2017 3:30 PM Amado Rodriguez, PTA MMCPTPB [facility]   8/31/2017 3:30 PM Amado Rodriguez, PTA MMCPTPB [facility]   8/31/2017 4:00 PM Amado Rodriguez, PTA MMCPTPB [facility]

## 2017-08-16 NOTE — PROGRESS NOTES
PT DAILY TREATMENT NOTE     Patient Name: Sarita Wilson  Date:2017  : 1980  [x]  Patient  Verified  Payor: [place] / Plan: 1500 / Product Type: Medicaid /    In time: 4:30  Out time: 5:10  Total Treatment Time (min): 40  Visit #: 10 of     Treatment Area: Spinal stenosis, cervical region [M48.02]    SUBJECTIVE  Pain Level (0-10 scale): 2/10  Any medication changes, allergies to medications, adverse drug reactions, diagnosis change, or new procedure performed?: [x] No    [] Yes (see summary sheet for update)  Subjective functional status/changes:   [] No changes reported  Pt reports his neck isn't too bad today. He notes some burning sensations but overall still feels his motion has improved.     OBJECTIVE    Modality rationale: decrease pain and increase tissue extensibility to improve the patients ability to perform ADLs with centralized symptoms   Min Type Additional Details    [] Estim:  []Unatt       []IFC  []Premod                        []Other:  []w/ice   []w/heat  Position:  Location:    [] Estim: []Att    []TENS instruct  []NMES                    []Other:  []w/US   []w/ice   []w/heat  Position:  Location:    []  Traction: [] Cervical       []Lumbar                       [] Prone          []Supine                       []Intermittent   []Continuous Lbs:  [] before manual  [] after manual    []  Ultrasound: []Continuous   [] Pulsed                           []1MHz   []3MHz W/cm2:  Location:    []  Iontophoresis with dexamethasone         Location: [] Take home patch   [] In clinic   10 []  Ice     [x]  heat  []  Ice massage  []  Laser   []  Anodyne Position:seated  Location: B UTs; t/s/l/s    []  Laser with stim  []  Other:  Position:  Location:    []  Vasopneumatic Device Pressure:       [] lo [] med [] hi   Temperature: [] lo [] med [] hi   [x] Skin assessment post-treatment:  [x]intact []redness- no adverse reaction    []redness  adverse reaction: 30 min Therapeutic Exercise:  [x] See flow sheet :   Rationale: increase ROM and increase strength to improve the patients ability to centralize symptoms and improve posture awareness for decreased pain          With   [] TE   [] TA   [] neuro   [] other: Patient Education: [x] Review HEP    [] Progressed/Changed HEP based on:   [] positioning   [] body mechanics   [] transfers   [] heat/ice application    [] other:      Other Objective/Functional Measures:   Continues to have L levator scapula tightness  Good form with wall Ws slight pain in l/s during  Cues for decreased range with chin tucks to correctly engage anterior c/s musculature  No increased radicular symptoms during session     Pain Level (0-10 scale) post treatment: 1/10    ASSESSMENT/Changes in Function: Pt making steady progress with decreasing neck pain and improving mobility. Pt is still getting some burning radicular symptoms into bilateral shoulders but not as constant or intense. Will continue to work on postural strengthening and decreased compression on the c/s for centralization of symptoms and improved sleeping ability and performance of ADLs. Patient will continue to benefit from skilled PT services to modify and progress therapeutic interventions, address functional mobility deficits, address ROM deficits, address strength deficits, analyze and address soft tissue restrictions, analyze and cue movement patterns, analyze and modify body mechanics/ergonomics, assess and modify postural abnormalities, address imbalance/dizziness and instruct in home and community integration to attain remaining goals.              Updated Goals: to be achieved in 4 weeks:  1. Pt will report <5/10 pain in order to improve sleeping ability. 2. Pt will report 50% improvement with radicular sx in order to improve QOL.   3. Pt will demonstrate understanding/compliance with final HEP for continued progression independently upon DC.      PLAN  [x]  Upgrade activities as tolerated     [x]  Continue plan of care  []  Update interventions per flow sheet       []  Discharge due to:_  []  Other:_      Florencia German PTA 8/16/2017  4:05 PM    Future Appointments  Date Time Provider Joseph Elisabeth   8/16/2017 4:30 PM Florencia German PTA [email or web address] [facility]   8/22/2017 3:00 PM Florencia German PTA MMCPTPB [facility]   8/22/2017 3:30 PM Florencia German PTA MMCPTPB [facility]   8/24/2017 2:30 PM Paulie Beck, PT [id number] [facility]   8/24/2017 3:00 PM Paulie Beck, PT MMCPTPB [facility]   8/29/2017 3:00 PM Florencia German, ABIGAIL MMCPTPB [facility]   8/29/2017 3:30 PM Florencia German, PTA MMCPTPB [facility]   8/31/2017 3:30 PM Florencia German, PTA MMCPTPB [facility]   8/31/2017 4:00 PM Florencia German, PTA MMCPTPB [facility]

## 2017-08-22 ENCOUNTER — HOSPITAL ENCOUNTER (OUTPATIENT)
Dept: PHYSICAL THERAPY | Age: 37
Discharge: HOME | End: 2017-08-22
Payer: MEDICAID

## 2017-08-22 PROCEDURE — 97110 THERAPEUTIC EXERCISES: CPT

## 2017-08-22 NOTE — PROGRESS NOTES
PT DAILY TREATMENT NOTE     Patient Name: Austin Cervantes  Date:2017  : 1980  [x]  Patient  Verified  Payor: [place] / Plan: 1500  / Product Type: Medicaid /    In time: 3:30  Out time: 4:15  Total Treatment Time (min): 45  Visit #: 11     Treatment Area: Spinal stenosis, cervical region [M48.02]    SUBJECTIVE  Pain Level (0-10 scale): 1-2/10  Any medication changes, allergies to medications, adverse drug reactions, diagnosis change, or new procedure performed?: [x] No    [] Yes (see summary sheet for update)  Subjective functional status/changes:   [] No changes reported  Pt reports his neck isn't bothering him too much today and after last visit it wasn't bad either. Pt notes still getting burning sensation but not too bad. Pt feels occasional sharp pain or tingling initially with movement which he thinks is the nerve irritation.     OBJECTIVE    Modality rationale: decrease pain and increase tissue extensibility to improve the patients ability to perform ADLs with centralized symptoms   Min Type Additional Details    [] Estim:  [x]Unatt       []IFC  []Premod                        []Other:  []w/ice   []w/heat  Position:  Location:    [] Estim: []Att    []TENS instruct  []NMES                    []Other:  []w/US   []w/ice   []w/heat  Position:  Location:    []  Traction: [] Cervical       []Lumbar                       [] Prone          []Supine                       []Intermittent   []Continuous Lbs:  [] before manual  [] after manual    []  Ultrasound: []Continuous   [] Pulsed                           []1MHz   []3MHz W/cm2:  Location:    []  Iontophoresis with dexamethasone         Location: [] Take home patch   [] In clinic   10 []  Ice     [x]  heat  []  Ice massage  []  Laser   []  Anodyne Position:seated  Location: B UTs; t/s; l/s    []  Laser with stim  []  Other:  Position:  Location:    []  Vasopneumatic Device Pressure:       [] lo [] med [] hi Temperature: [] lo [] med [] hi   [x] Skin assessment post-treatment:  [x]intact []redness- no adverse reaction    []redness  adverse reaction:      35 min Therapeutic Exercise:  [x] See flow sheet :   Rationale: increase ROM and increase strength to improve the patients ability to centralize symptoms and improve posture awareness for decreased pain          With   [] TE   [] TA   [] neuro   [] other: Patient Education: [x] Review HEP    [] Progressed/Changed HEP based on:   [] positioning   [] body mechanics   [] transfers   [] heat/ice application    [] other:      Other Objective/Functional Measures:   Improved form with supine c/s exercises  Fatigue with blue bands for standing TB exercises  Some challenge in finding balance in standing  Fatigue with TB Ws with BTB  No increased pain during session     Pain Level (0-10 scale) post treatment: 1-2/10    ASSESSMENT/Changes in Function: Pt progressing with centralizing symptoms and decreased neck pain. Pt still has times of radicular symptoms with rotation likely due to changes in his bone structure and health. Pt progressing well with scapular exercises for improved posture. Will continue working on posture awareness and centralized symptoms for decreased pain with sleeping and ADLs. Patient will continue to benefit from skilled PT services to modify and progress therapeutic interventions, address functional mobility deficits, address ROM deficits, address strength deficits, analyze and address soft tissue restrictions, analyze and cue movement patterns, analyze and modify body mechanics/ergonomics, assess and modify postural abnormalities, address imbalance/dizziness and instruct in home and community integration to attain remaining goals. Updated Goals: to be achieved in 4 weeks:  1. Pt will report <5/10 pain in order to improve sleeping ability. 2. Pt will report 50% improvement with radicular sx in order to improve QOL.   3. Pt will demonstrate understanding/compliance with final HEP for continued progression independently upon DC.      PLAN  [x]  Upgrade activities as tolerated     [x]  Continue plan of care  []  Update interventions per flow sheet       []  Discharge due to:_  []  Other:_      Libby Walters PTA 8/22/2017  2:40 PM    Future Appointments  Date Time Provider Joseph Barber   8/22/2017 3:00 PM Libby Walters PTA [facility]   8/22/2017 3:30 PM Libby Walters PTA [facility]   8/24/2017 2:30 PM Cristian Garcia, PT [facility]   8/24/2017 3:00 PM Cristian Garcia, PT [id number]

## 2017-08-22 NOTE — PROGRESS NOTES
PT DAILY TREATMENT NOTE     Patient Name: Mina Jimenez  Date:2017  : 1980  [x]  Patient  Verified  Payor: [place] / Plan: 1500  / Product Type: Medicaid /    In time: 3:02  Out time:3:30  Total Treatment Time (min): 28  Visit #: 4 of 12    Treatment Area: Spinal stenosis, cervical region [M48.02]    SUBJECTIVE  Pain Level (0-10 scale): 6/10 back; 5/10 hip  Any medication changes, allergies to medications, adverse drug reactions, diagnosis change, or new procedure performed?: [x] No    [] Yes (see summary sheet for update)  Subjective functional status/changes:   [] No changes reported  Pt reports average pain in his L hip and back is 8.5/10. OBJECTIVE    23 min Therapeutic Exercise:  [x] See flow sheet :   Rationale: increase ROM, increase strength and improve coordination to improve the patients ability to ambulate with decreased back/hip pain    5 min Manual Therapy:  Leg lengthening to correct L upslip followed by shotgun technique   Rationale: decrease pain, increase ROM and increase tissue extensibility to improve ease of ambulation          With   [] TE   [] TA   [] neuro   [] other: Patient Education: [x] Review HEP    [] Progressed/Changed HEP based on:   [] positioning   [] body mechanics   [] transfers   [] heat/ice application    [] other:      Other Objective/Functional Measures:   Increased to 1/4 inch heel lift in R shoe  Audible pop with shotgun technique  Challenged with TB IR/ER  Good form with TA holds with exhalation  Decreased IR mobility on L hip     Pain Level (0-10 scale) post treatment: 4/10 but increased muscle soreness    ASSESSMENT/Changes in Function: Pt continues to average 8.5/10 pain in his L hip and back causing increased R LE weightbearing. Pt continues with LLD but is progressing with heel lift height to address imbalance. Pt has decreased L hip strength and mobility.  Will continue progressing core/hip strength for decreased pain with ambulation and ease of performing ADLs. Patient will continue to benefit from skilled PT services to modify and progress therapeutic interventions, address functional mobility deficits, address ROM deficits, address strength deficits, analyze and address soft tissue restrictions, analyze and cue movement patterns, analyze and modify body mechanics/ergonomics, assess and modify postural abnormalities, address imbalance/dizziness and instruct in home and community integration to attain remaining goals. Progress towards goals / Updated goals:  Short Term Goals: To be accomplished in 1 weeks:  1. Pt will be compliant with initial HEP to improve therapy outcomes. -progressing, patient reports doing some of the HEP, states, \"it depends on my pain\" (8/11/2017)  Long Term Goals: To be accomplished in 6 weeks:  1. Pt will improve FOTO by 14 points in order to demonstrate functional improvement  2. Pt will improve L hip strength to 4+/5 in order to improve stability for increased weight-bearing tolerance   3. Pt will improve standing/ambulating tolerance to >/= 5 minutes in order to improve ease of household tasks and community engagement   4. Pt will report <6/10 average pain in order to improve QOL.  NOt met 8.5/10 on average (8/22/17)     PLAN  [x]  Upgrade activities as tolerated     [x]  Continue plan of care  []  Update interventions per flow sheet       []  Discharge due to:_  []  Other:_      Brittany Steen PTA 8/22/2017  2:40 PM    Future Appointments  Date Time Provider Joseph Barber   8/22/2017 3:00 PM Brittany Steen PTA [facility]   8/22/2017 3:30 PM Brittany Steen PTA [facility]   8/24/2017 2:30 PM Shayy Gilbert PT [facility]   8/24/2017 3:00 PM Shayy Gilbert PT [facility]

## 2017-08-24 ENCOUNTER — HOSPITAL ENCOUNTER (OUTPATIENT)
Dept: PHYSICAL THERAPY | Age: 37
Discharge: HOME | End: 2017-08-24
Payer: MEDICAID

## 2017-08-24 PROCEDURE — 97110 THERAPEUTIC EXERCISES: CPT | Performed by: PHYSICAL THERAPIST

## 2017-08-24 PROCEDURE — 97140 MANUAL THERAPY 1/> REGIONS: CPT | Performed by: PHYSICAL THERAPIST

## 2017-08-24 NOTE — PROGRESS NOTES
PT DAILY TREATMENT NOTE     Patient Name: Kai Marie  Date:2017  : 1980  [x]  Patient  Verified  Payor: [place] / Plan: 1500 / Product Type: Medicaid /    In time:3:00  Out time:3:42  Total Treatment Time (min): 42  Visit #: 12     Treatment Area: Spinal stenosis, cervical region [M48.02]    SUBJECTIVE  Pain Level (0-10 scale): 5-6  Any medication changes, allergies to medications, adverse drug reactions, diagnosis change, or new procedure performed?: [x] No    [] Yes (see summary sheet for update)  Subjective functional status/changes:   [] No changes reported  Today the neck hurts worse. No new issues. OBJECTIVE    Modality rationale: decrease pain to improve the patients ability to complete ADLs   Min Type Additional Details   10 []  Ice     [x]  heat  []  Ice massage  []  Laser   []  Anodyne Position:sitting  Location: UTs, l/s, t/s   [x] Skin assessment post-treatment:  [x]intact [x]redness- no adverse reaction    []redness  adverse reaction:     32 min Therapeutic Exercise:  [x] See flow sheet :   Rationale: increase ROM, increase strength and decrease pain to improve the patients ability to complete ADLs     With   [] TE   [] TA   [] neuro   [] other: Patient Education: [x] Review HEP    [] Progressed/Changed HEP based on:   [] positioning   [] body mechanics   [] transfers   [] heat/ice application    [] other:         Pain Level (0-10 scale) post treatment: 3-4    ASSESSMENT/Changes in Function: Patient responds well to treatment session. Is challenged with resistance exercises, but able to improve upon last session with regard to repetitions. Progress as tolerated.  No adverse effects were noted from today's treatment session    Patient will continue to benefit from skilled PT services to modify and progress therapeutic interventions, address functional mobility deficits, address ROM deficits, address strength deficits, analyze and address soft tissue restrictions and analyze and cue movement patterns to attain remaining goals. [x]  See Plan of Care  []  See progress note/recertification  []  See Discharge Summary         Progress towards goals / Updated goals:  Updated Goals: to be achieved in 4 weeks:  1. Pt will report <5/10 pain in order to improve sleeping ability. 2. Pt will report 50% improvement with radicular sx in order to improve QOL. 3. Pt will demonstrate understanding/compliance with final HEP for continued progression independently upon DC.      PLAN  [x]  Upgrade activities as tolerated     [x]  Continue plan of care  []  Update interventions per flow sheet       []  Discharge due to:_  [x]  Other:_ reschedule after return from being [address] [place] [address], PT, DPT 8/24/2017  3:08 PM    No future appointments.

## 2017-08-24 NOTE — PROGRESS NOTES
PT DAILY TREATMENT NOTE     Patient Name: Nhi Bui  Date:2017  : 1980  [x]  Patient  Verified  Payor: [place] / Plan: 1500  / Product Type: Medicaid /    In time:2:34  Out time:3:00  Total Treatment Time (min): 26  Visit #: 5 of 12    Treatment Area: Spinal stenosis, cervical region [M48.02]    SUBJECTIVE  Pain Level (0-10 scale): 3-4  Any medication changes, allergies to medications, adverse drug reactions, diagnosis change, or new procedure performed?: [x] No    [] Yes (see summary sheet for update)  Subjective functional status/changes:   [] No changes reported  The back feels good today. The neck is pretty bad. Needs to get alignment in check before doing any exercises. OBJECTIVE    18 min Therapeutic Exercise:  [x] See flow sheet :   Rationale: increase ROM, increase strength and decrease pain to improve the patients ability to complete ADLs    8 min Manual Therapy:  Pelvic alignment, shotgun method; L upslip correction   Rationale: decrease pain to complete ADLs        With   [] TE   [] TA   [] neuro   [] other: Patient Education: [x] Review HEP    [] Progressed/Changed HEP based on:   [] positioning   [] body mechanics   [] transfers   [] heat/ice application    [] other:         Pain Level (0-10 scale) post treatment: 3/10    ASSESSMENT/Changes in Function: Patient responds well to treatment session. Patient displays some hesitancy with beginning exercises due to concern about hip alignment. Once addressed, he is able to complete some exercises, with minimal difficulty. Progress as tolerated.  No adverse effects were noted from today's treatment session      Patient will continue to benefit from skilled PT services to modify and progress therapeutic interventions, address functional mobility deficits, address ROM deficits, address strength deficits, analyze and address soft tissue restrictions and analyze and cue movement patterns to attain remaining goals. [x]  See Plan of Care  []  See progress note/recertification  []  See Discharge Summary         Progress towards goals / Updated goals:  Short Term Goals: To be accomplished in 1 weeks:  1. Pt will be compliant with initial HEP to improve therapy outcomes. -progressing, patient reports doing some of the HEP, states, \"it depends on my pain\" (8/11/2017)  Long Term Goals: To be accomplished in 6 weeks:  1. Pt will improve FOTO by 14 points in order to demonstrate functional improvement  2. Pt will improve L hip strength to 4+/5 in order to improve stability for increased weight-bearing tolerance   3. Pt will improve standing/ambulating tolerance to >/= 5 minutes in order to improve ease of household tasks and community engagement   4. Pt will report <6/10 average pain in order to improve QOL.  NOt met 8.5/10 on average (8/22/17)     PLAN  []  Upgrade activities as tolerated     [x]  Continue plan of care  []  Update interventions per flow sheet       []  Discharge due to:_  []  Other:_      Chirstiano Peralta PT, DPT 8/24/2017  2:39 PM    Future Appointments  Date Time Provider Joseph Barber   8/24/2017 3:00 PM Christiano Peralta PT [facility]

## 2017-08-29 ENCOUNTER — APPOINTMENT (OUTPATIENT)
Dept: PHYSICAL THERAPY | Age: 37
End: 2017-08-29
Payer: MEDICAID

## 2017-08-31 ENCOUNTER — APPOINTMENT (OUTPATIENT)
Dept: PHYSICAL THERAPY | Age: 37
End: 2017-08-31
Payer: MEDICAID

## 2017-09-13 ENCOUNTER — HOSPITAL ENCOUNTER (OUTPATIENT)
Dept: PHYSICAL THERAPY | Age: 37
Discharge: HOME | End: 2017-09-13
Payer: MEDICAID

## 2017-09-13 PROCEDURE — 97140 MANUAL THERAPY 1/> REGIONS: CPT

## 2017-09-13 PROCEDURE — 97110 THERAPEUTIC EXERCISES: CPT

## 2017-09-13 NOTE — PROGRESS NOTES
PT DAILY TREATMENT NOTE 3    Patient Name: Anne Castorena  Date:2017  : 1980  [x]  Patient  Verified  Payor: [place] / Plan: 1500 / Product Type: Medicaid /    In time:2:07  Out time:2:39  MHP x10 minutes (3:06-3:16 PM)  Total Treatment Time (min): 42  Visit #: 13     Treatment Area: Spinal stenosis, cervical region [M48.02]    SUBJECTIVE  Pain Level (0-10 scale): 5  Any medication changes, allergies to medications, adverse drug reactions, diagnosis change, or new procedure performed?: [x] No    [] Yes (see summary sheet for update)  Subjective functional status/changes:   [] No changes reported  \"The symptoms don't happen as often since starting therapy. \" Patient reports being out of town over past few weeks.      OBJECTIVE  Modality rationale: decrease pain and increase tissue extensibility to improve the patients ability to ease ADL tolerance   Min Type Additional Details    [] Estim:  []Unatt       []IFC  []Premod                        []Other:  []w/ice   []w/heat  Position:  Location:    [] Estim: []Att    []TENS instruct  []NMES                    []Other:  []w/US   []w/ice   []w/heat  Position:  Location:    []  Traction: [] Cervical       []Lumbar                       [] Prone          []Supine                       []Intermittent   []Continuous Lbs:  [] before manual  [] after manual    []  Ultrasound: []Continuous   [] Pulsed                           []1MHz   []3MHz Location:  W/cm2:    []  Iontophoresis with dexamethasone         Location: [] Take home patch   [] In clinic   10 []  Ice     [x]  heat  []  Ice massage  []  Laser   []  Anodyne Position: seated  Location: c/s    []  Laser with stim  []  Other: Position:  Location:    []  Vasopneumatic Device Pressure:       [] lo [] med [] hi   Temperature: [] lo [] med [] hi   [x] Skin assessment post-treatment:  [x]intact []redness- no adverse reaction    []redness  adverse reaction:     32 min Therapeutic Exercise:  [x] See flow sheet :   Rationale: increase ROM, increase strength and improve coordination to improve the patients ability to increase ease with ADLs    With   [] TE   [] TA   [] neuro   [] other: Patient Education: [x] Review HEP    [] Progressed/Changed HEP based on:   [] positioning   [] body mechanics   [] transfers   [] heat/ice application    [] other:      Other Objective/Functional Measures:   Reports 50-60% improvement since SOC     Pain Level (0-10 scale) post treatment: 0    ASSESSMENT/Changes in Function: Patient is D/C today. Patient with almost three week leave from therapy due to being out of town. Patient with inconsistent reports, stating that his symptoms had been acting up due to the weather yet, today, reports 50-60% improvement in symptoms since [facility]. Patient is provided with orange theraband and updated HEP for continued self-maintenance. Patient will continue to benefit from skilled PT services to modify and progress therapeutic interventions, address functional mobility deficits, address ROM deficits, address strength deficits, analyze and address soft tissue restrictions, analyze and cue movement patterns, analyze and modify body mechanics/ergonomics and assess and modify postural abnormalities to attain remaining goals. []  See Plan of Care  []  See progress note/recertification  [x]  See Discharge Summary       Progress towards goals / Updated goals:  Updated Goals: to be achieved in 4 weeks:  1. Pt will report <5/10 pain in order to improve sleeping ability. -not met, patient reports 5-6/10 (9/13/2017)   2. Pt will report 50% improvement with radicular sx in order to improve QOL. -met, patient reports 50-60% improvement (9/13/2017)  3.  Pt will demonstrate understanding/compliance with final HEP for continued progression independently upon DC.  -met, patient provided with updated HEP and orange theraband for home use (9/13/2017)       PLAN  []  Upgrade activities as tolerated     [x]  Continue plan of care  []  Update interventions per flow sheet       []  Discharge due to:_  []  Other:_      [place]or[place] Cobia 9/13/2017  2:13 PM    Future Appointments  Date Time Provider Joseph Barber   9/13/2017 2:30 PM Anne Cobia [facility]   9/27/2017 11:30 AM Bill Gramajo  [facility]

## 2017-09-13 NOTE — PROGRESS NOTES
PT DAILY TREATMENT NOTE 3-16    Patient Name: Grandville Meigs  Date:2017  : 1980  [x]  Patient  Verified  Payor: [place] / Plan: 1500 / Product Type: Medicaid /    In time:2:40  Out time:3:16  Total Treatment Time (min): 36  Visit #: 6 of 12    Treatment Area: Spinal stenosis, cervical region [M48.02]    SUBJECTIVE  Pain Level (0-10 scale): 7-8/10 left hip. 5.5/10 l/s  Any medication changes, allergies to medications, adverse drug reactions, diagnosis change, or new procedure performed?: [x] No    [] Yes (see summary sheet for update)  Subjective functional status/changes:   [] No changes reported  \"I haven't really been doing the exercises at home. \" Patient reports that he was out of town on vacation for almost three weeks.     OBJECTIVE  Modality rationale: decrease pain and increase tissue extensibility to improve the patients ability to ease ADL tolerance   Min Type Additional Details    [] Estim:  []Unatt       []IFC  []Premod                        []Other:  []w/ice   []w/heat  Position:  Location:    [] Estim: []Att    []TENS instruct  []NMES                    []Other:  []w/US   []w/ice   []w/heat  Position:  Location:    []  Traction: [] Cervical       []Lumbar                       [] Prone          []Supine                       []Intermittent   []Continuous Lbs:  [] before manual  [] after manual    []  Ultrasound: []Continuous   [] Pulsed                           []1MHz   []3MHz Location:  W/cm2:    []  Iontophoresis with dexamethasone         Location: [] Take home patch   [] In clinic   10 []  Ice     [x]  heat  []  Ice massage  []  Laser   []  Anodyne Position: seated  Location: l/s    []  Laser with stim  []  Other: Position:  Location:    []  Vasopneumatic Device Pressure:       [] lo [] med [] hi   Temperature: [] lo [] med [] hi   [x] Skin assessment post-treatment:  [x]intact []redness- no adverse reaction    []redness  adverse reaction:     16 min Therapeutic Exercise:  [x] See flow sheet :   Rationale: increase ROM, increase strength and improve coordination to improve the patients ability to increase ease with ADLs    10 min Manual Therapy:  MET and shotgun to correct R AI/L PI. Gentle left leg pull to correct left upslip   Rationale: decrease pain, increase ROM and increase tissue extensibility to ease ADL tolerance             With   [x] TE   [x] TA   [] neuro   [] other: Patient Education: [x] Review HEP    [] Progressed/Changed HEP based on:   [] positioning   [] body mechanics   [] transfers   [] heat/ice application    [] other:      Other Objective/Functional Measures:   FOTO score:38  Left hip flexion: 4-/5. Abd:4/5. ER:4/5. IR:4-/5  Pelvis alignment symmetrical post manual     Pain Level (0-10 scale) post treatment: 0    ASSESSMENT/Changes in Function: Patient with limited progress towards goals, almost three week gap between today and previous session due to patient being out of town most likely contributing. While patient's FOTO score has decreased and left hip MMT remains relatively unchanged since [facility], patient does report decreased average pain and reports able to stand/ambulate for 15 minutes. Will continue 2x/3 weeks for improved core strength and hip stability. Patient will continue to benefit from skilled PT services to modify and progress therapeutic interventions, address functional mobility deficits, address ROM deficits, address strength deficits, analyze and address soft tissue restrictions, analyze and cue movement patterns, analyze and modify body mechanics/ergonomics and assess and modify postural abnormalities to attain remaining goals. []  See Plan of Care  [x]  See progress note/recertification  []  See Discharge Summary         Progress towards goals / Updated goals:  Short Term Goals: To be accomplished in 1 weeks:  1. Pt will be compliant with initial HEP to improve therapy outcomes. -progressing, patient reports doing some of the HEP, states, \"it depends on my pain\" (8/11/2017)  Long Term Goals: To be accomplished in 6 weeks:  1. Pt will improve FOTO by 14 points in order to demonstrate functional improvement.-not met, decrease by 1 point (hip) and 10 points (l/s) (9/13/2017)  2. Pt will improve L hip strength to 4+/5 in order to improve stability for increased weight-bearing tolerance.-not met, left hip flexion: 4-/5. Abd: 4/5. ER: 4/5. IR 4-/5 (913/2017)  3. Pt will improve standing/ambulating tolerance to >/= 5 minutes in order to improve ease of household tasks and community engagement.-met, patient reports 15 minutes (9/13/2017)  4. Pt will report <6/10 average pain in order to improve QOL.  Met, reports 5/10 (9/13/2017)     PLAN  []  Upgrade activities as tolerated     [x]  Continue plan of care  []  Update interventions per flow sheet       []  Discharge due to:_  []  Other:_      Betsey Lozano 9/13/2017  2:15 PM    Future Appointments  Date Time Provider Joseph Barber   9/13/2017 2:30 PM Betsey Lozano [facility]   9/27/2017 11:30 AM Darian Graves  [facility]

## 2017-09-13 NOTE — PROGRESS NOTES
[facility] Jesus White  [address] [facility]  [phone] fax    Physical Therapy Progress Note  Patient name: Hermilo [place] Start of Care: 17   Referral source: Cesar Andrews NP : 1980   Medical/Treatment Diagnosis: Spinal stenosis, cervical region [M48.02] Onset Date:7-8 years ago with progressive decline      Prior Hospitalization: see medical history Provider#: [id number]   Medications: Verified on Patient Summary List    Comorbidities: fetal alcohol syndrome, asthma, scoliosis, weight change > 10 lbs, arthritis, depression, tobacco use (1 pack every 3 days), bipolar   Prior Level of Function: lives with friends in a 1 story home with HR at entry steps, computer for recreational time     Visits from [place] of Care: 6    Missed Visits: 1    Established Goals:         Excellent           Good         Limited           None  [] Increased ROM   []  []  []  []  [x] Increased Strength  []  []  [x]  []  [] Increased Mobility  []  []  []  []   [x] Decreased Pain   []  [x]  []  []  [] Decreased Swelling  []  []  []  []    Key Functional Changes: slowly decreasing pain levels with average pain of 5/10, increased standing/ambulatory tolerance to 15 minutes     Updated Goals: to be achieved in 3 weeks:  1. Pt will improve FOTO by 14 points in order to demonstrate functional improvement. 2. Pt will improve L hip strength to 4+/5 in order to improve stability for increased weight-bearing tolerance. 3. Pt will demonstrate understanding/compliance with final HEP for continued progression independently upon DC    ASSESSMENT/RECOMMENDATIONS:  Pt is making limited progress in therapy as he has not attended therapy for 3 weeks d/t vacation. He was not compliant with HEP during his absence.   FOTO score has decreased and strength has grossly remained unchanged since last assessed; however, pt does report slowly reducing pain levels and improving standing/ambulatory tolerance of 15 minutes. Pt will benefit from continued skilled PT with consistent attendance to address remaining flexibility and core/hip stability deficits with transition towards final HEP for continued progression independently upon DC. [x]Continue therapy per initial plan/protocol at a frequency of  2 x per week for 3 weeks  []Continue therapy with the following recommended changes:_____________________      _____________________________________________________________________  []Discontinue therapy progressing towards or have reached established goals  []Discontinue therapy due to lack of appreciable progress towards goals  []Discontinue therapy due to lack of attendance or compliance  []Await Physician's recommendations/decisions regarding therapy  []Other:________________________________________________________________    Thank you for this referral.   Wilber Hernandez, PT 9/13/2017 5:03 PM    NOTE TO PHYSICIAN:  PLEASE COMPLETE THE ORDERS BELOW AND   FAX TO [facility]: [phone]  If you are unable to process this request in 24 hours please contact our office: [phone] I have read the above report and request that my patient continue as recommended. ? I have read the above report and request that my patient continue therapy with the following changes/special instructions:____________________________________  ? I have read the above report and request that my patient be discharged from therapy.     Physicians signature: ______________________________Date: ______Time:______

## 2017-09-15 ENCOUNTER — HOSPITAL ENCOUNTER (OUTPATIENT)
Dept: PHYSICAL THERAPY | Age: 37
Discharge: HOME | End: 2017-09-15
Payer: MEDICAID

## 2017-09-15 PROCEDURE — 97110 THERAPEUTIC EXERCISES: CPT

## 2017-09-15 PROCEDURE — 97140 MANUAL THERAPY 1/> REGIONS: CPT

## 2017-09-15 NOTE — PROGRESS NOTES
PT DAILY TREATMENT NOTE 3-16    Patient Name: Austin Cervantes  Date:9/15/2017  : 1980  [x]  Patient  Verified  Payor: [place] / Plan: 1500 / Product Type: Medicaid /    In time:12:00 Out time:12:40  Total Treatment Time (min): 40  Visit #: 7 of 12    Treatment Area: Spinal stenosis, cervical region [M48.02]    SUBJECTIVE  Pain Level (0-10 scale): 1  Any medication changes, allergies to medications, adverse drug reactions, diagnosis change, or new procedure performed?: [x] No    [] Yes (see summary sheet for update)  Subjective functional status/changes:   [] No changes reported  \"I'm having a good day today. I'm not feeling much of any pain today. \"    OBJECTIVE  Modality rationale: decrease pain and increase tissue extensibility to improve the patients ability to ease ADL tolerance   Min Type Additional Details    [] Estim:  []Unatt       []IFC  []Premod                        []Other:  []w/ice   []w/heat  Position:  Location:    [] Estim: []Att    []TENS instruct  []NMES                    []Other:  []w/US   []w/ice   []w/heat  Position:  Location:    []  Traction: [] Cervical       []Lumbar                       [] Prone          []Supine                       []Intermittent   []Continuous Lbs:  [] before manual  [] after manual    []  Ultrasound: []Continuous   [] Pulsed                           []1MHz   []3MHz Location:  W/cm2:    []  Iontophoresis with dexamethasone         Location: [] Take home patch   [] In clinic   10 []  Ice     [x]  heat  []  Ice massage  []  Laser   []  Anodyne Position: seated  Location: l/s     []  Laser with stim  []  Other: Position:  Location:    []  Vasopneumatic Device Pressure:       [] lo [] med [] hi   Temperature: [] lo [] med [] hi   [x] Skin assessment post-treatment:  [x]intact []redness- no adverse reaction    []redness  adverse reaction:     30 min Therapeutic Exercise:  [x] See flow sheet :   Rationale: increase ROM, increase strength and improve coordination to improve the patients ability to increase ease with ADLs    10 min Manual Therapy:  MET and shotgun to correct R AI/L PI   Rationale: decrease pain, increase ROM and increase tissue extensibility to ease ADL tolerance           With   [x] TE   [x] TA   [] neuro   [] other: Patient Education: [x] Review HEP    [] Progressed/Changed HEP based on:   [] positioning   [] body mechanics   [] transfers   [] heat/ice application    [] other:      Other Objective/Functional Measures:   R AI/L PI corrected with MET x1   Added standing therex    Pain Level (0-10 scale) post treatment: 0    ASSESSMENT/Changes in Function: Patient with much improved tolerance to session, reports \"It is a good day\" with lowest pain reports since [facility]. Able to add gentle standing therex. Will continue with standing therex per patient tolerance, working towards HEP with emphasis on self-maintenance. Patient will continue to benefit from skilled PT services to modify and progress therapeutic interventions, address functional mobility deficits, address ROM deficits, address strength deficits, analyze and address soft tissue restrictions, analyze and cue movement patterns, analyze and modify body mechanics/ergonomics and assess and modify postural abnormalities to attain remaining goals. []  See Plan of Care  []  See progress note/recertification  []  See Discharge Summary         Updated Goals: to be achieved in 3 weeks:  1. Pt will improve FOTO by 14 points in order to demonstrate functional improvement. 2. Pt will improve L hip strength to 4+/5 in order to improve stability for increased weight-bearing tolerance.   3. Pt will demonstrate understanding/compliance with final HEP for continued progression independently upon DC    PLAN  [x]  Upgrade activities as tolerated     [x]  Continue plan of care  []  Update interventions per flow sheet       []  Discharge due to:_  []  Other:Stacy Moon Yun Saenz 9/15/2017  12:03 PM    Future Appointments  Date Time Provider Joseph Barber   9/18/2017 1:30 PM Lorelei Correa, PT [facility]   9/21/2017 1:00 PM Danilo Rodriguez, PTA [facility]   9/25/2017 12:30 PM Danilo Rodriguez, PTA [facility]   9/27/2017 11:30 AM Zeinab Chance NP [facility]   9/28/2017 3:30 PM Danilo Rodriguez, PTA [facility]

## 2017-09-18 ENCOUNTER — HOSPITAL ENCOUNTER (OUTPATIENT)
Dept: PHYSICAL THERAPY | Age: 37
Discharge: HOME | End: 2017-09-18
Payer: MEDICAID

## 2017-09-18 PROCEDURE — 97110 THERAPEUTIC EXERCISES: CPT

## 2017-09-18 NOTE — PROGRESS NOTES
PT DAILY TREATMENT NOTE     Patient Name: Benito Golden  Date:2017  : 1980  [x]  Patient  Verified  Payor: [place] / Plan: 1500 / Product Type: Medicaid /    In time:1:40  Out time: 2:19  Total Treatment Time (min): 39  Visit #: 8 of 12    Treatment Area: Spinal stenosis, cervical region [M48.02]    SUBJECTIVE  Pain Level (0-10 scale): 7.5/10  Any medication changes, allergies to medications, adverse drug reactions, diagnosis change, or new procedure performed?: [x] No    [] Yes (see summary sheet for update)  Subjective functional status/changes:   [] No changes reported  Pt reports that he is still dealing with everyday pain and weather-related pain, but he believes therapy is helping     OBJECTIVE    Modality rationale: decrease pain and increase tissue extensibility to improve the patients ability to tolerate daily tasks    Min Type Additional Details    [] Estim:  []Unatt       []IFC  []Premod                        []Other:  []w/ice   []w/heat  Position:  Location:    [] Estim: []Att    []TENS instruct  []NMES                    []Other:  []w/US   []w/ice   []w/heat  Position:  Location:    []  Traction: [] Cervical       []Lumbar                       [] Prone          []Supine                       []Intermittent   []Continuous Lbs:  [] before manual  [] after manual    []  Ultrasound: []Continuous   [] Pulsed                           []1MHz   []3MHz W/cm2:  Location:    []  Iontophoresis with dexamethasone         Location: [] Take home patch   [] In clinic   10 []  Ice     [x]  heat  []  Ice massage  []  Laser   []  Anodyne Position: seated  Location: l/s    []  Laser with stim  []  Other:  Position:  Location:    []  Vasopneumatic Device Pressure:       [] lo [] med [] hi   Temperature: [] lo [] med [] hi   [x] Skin assessment post-treatment:  [x]intact []redness- no adverse reaction    []redness  adverse reaction:       29 min Therapeutic Exercise: [x] See flow sheet :   Rationale: increase ROM and increase strength to improve the patients ability to reduce pain and improve ambulatory ability and ease of functional tasks         With   [] TE   [] TA   [] neuro   [] other: Patient Education: [x] Review HEP    [] Progressed/Changed HEP based on:   [] positioning   [] body mechanics   [] transfers   [] heat/ice application    [] other:      Other Objective/Functional Measures:     L upslip corrected with leg lengthening exercise  L PI corrected with self-MET with shotgun   Gave pt handout for self-MET for continued pelvic obliquity if L PI    Gave pt last level of heel lift for a total height of 1/2\" - pt instructed to remove if any increased pain or balance impairments  Reduced pain following self- correction   No increased pain with therapy     Pain Level (0-10 scale) post treatment: 6.5/10    ASSESSMENT/Changes in Function:     Pt tolerated therapy without complaint of increased sx and is making slow progress towards therapy goals. He continues to report pain reduction with self-corrections and was educated on self-MET for continued obliquity of L PI this visit. Increased heel lift to 1/2\" to account for 1/2\" leg length discrepancy and reduce recurrence of repeated pelvic obliquity. Pt was educated to remove heel lift if increased pain or balance issues. Will continue to address flexibility and core/hip stability deficits to maintain pelvic alignment for reduced pain/improved ease with daily tasks and improved QOL. Patient will continue to benefit from skilled PT services to modify and progress therapeutic interventions, address functional mobility deficits, address ROM deficits, address strength deficits, analyze and address soft tissue restrictions, analyze and cue movement patterns, assess and modify postural abnormalities and instruct in home and community integration to attain remaining goals. Progress towards goals / Updated goals:  1.  Pt will improve FOTO by 14 points in order to demonstrate functional improvement. 2. Pt will improve L hip strength to 4+/5 in order to improve stability for increased weight-bearing tolerance.   3. Pt will demonstrate understanding/compliance with final HEP for continued progression independently upon DC    PLAN  []  Upgrade activities as tolerated     [x]  Continue plan of care  []  Update interventions per flow sheet       []  Discharge due to:_  []  Other:_      Evelin Britt, PT 9/18/2017  1:56 PM    Future Appointments  Date Time Provider Joseph Barber   9/21/2017 1:00 PM Shayla Reining, PTA [facility]   9/25/2017 12:30 PM Shayla Reining, PTA [facility]   9/27/2017 11:30 AM Kei Muniz  [address]   9/28/2017 3:30 PM Shayla Reining, PTA [facility]

## 2017-09-20 NOTE — PROGRESS NOTES
[facility] Maciej Mobley  [address] [facility]  [phone] fax    Physical Therapy Discharge Summary    Patient name: Demetra Connell Start of Care: 17   Referral source: Lidia Hannon NP : 1980   Medical/Treatment Diagnosis: Spinal stenosis, cervical region [M48.02] Onset Date:7-8 years ago with progressive decline      Prior Hospitalization: see medical history Provider#: [id number]   Medications: Verified on Patient Summary List     Comorbidities: fetal alcohol syndrome, asthma, scoliosis, weight change > 10 lbs, arthritis, depression, tobacco use (1 pack every 3 days), bipolar   Prior Level of Function: lives with friends in a 1 story home with HR at entry steps, [employer] for recreational time     Visits from [place] of Care: 13    Missed Visits: 1    Reporting Period : 17 to 17    Summary of Care:  Goal: Pt will report <5/10 pain in order to improve sleeping ability. Status at last note/certification: not met, patient reports 5-6/10  Status at discharge: not met    Goal: Pt will report 50% improvement with radicular sx in order to improve QOL  Status at last note/certification: met, patient reports 50-60% improvement  Status at discharge: met    Goal:  Pt will demonstrate understanding/compliance with final HEP for continued progression independently upon DC  Status at last note/certification: met, patient provided with updated HEP and orange theraband for home use  Status at discharge: met    ASSESSMENT/RECOMMENDATIONS:  Pt has made slow progress in therapy, meeting 2/3 final goals and slowly progressing with pain goals. Further progress was limited by inconsistent attendance as pt was out of town for the past 3 weeks. Pt reports reduced radicular sx and is compliant with HEP. DC at this time for patient to continue to address strength, flexibility, and postural deficits independently.       [x]Discontinue therapy: [x]Patient has reached or is progressing toward set goals      []Patient is non-compliant or has abdicated      []Due to lack of appreciable progress towards set goals    Cheryl Mallory, PT 9/20/2017 5:30 PM      NOTE TO PHYSICIAN:  Please complete the following and fax to: [facility] at [phone]  Retain this original for your records. If you are unable to process this request in   24 hours, please contact our office.      [] I have read the above report and request that my patient continue therapy with the following changes/special instructions:  [] I have read the above report and request that my patient be discharged from therapy    Physician's Signature:_____________________ Date:___________Time:__________

## 2017-09-21 ENCOUNTER — TELEPHONE (OUTPATIENT)
Dept: NEUROLOGY | Age: 37
End: 2017-09-21

## 2017-09-21 ENCOUNTER — HOSPITAL ENCOUNTER (OUTPATIENT)
Dept: PHYSICAL THERAPY | Age: 37
Discharge: HOME | End: 2017-09-21
Payer: MEDICAID

## 2017-09-21 PROCEDURE — 97110 THERAPEUTIC EXERCISES: CPT

## 2017-09-21 NOTE — PROGRESS NOTES
PT DAILY TREATMENT NOTE     Patient Name: Otilio Ding  Date:2017  : 1980  [x]  Patient  Verified  Payor: [place] / Plan: 1500 / Product Type: Medicaid /    In time: 1:01 Out time:1:47  Total Treatment Time (min): 46  Visit #: 9 of 12    Treatment Area: Spinal stenosis, cervical region [M48.02]    SUBJECTIVE  Pain Level (0-10 scale): 7/10  Any medication changes, allergies to medications, adverse drug reactions, diagnosis change, or new procedure performed?: [x] No    [] Yes (see summary sheet for update)  Subjective functional status/changes:   [] No changes reported  Pt reports more pain today deep in his joint and some cramping in his lower legs. He notes working on trying to walk correct with the heel lift in since he is used to compensating. He states it is taking a little time to get used to his balance with the lift in. Pt wants to know what to do when he isn't wearing shoes for his lift.      OBJECTIVE    Modality rationale: decrease pain and increase tissue extensibility to improve the patients ability to ambulate with decreased hip/back pain   Min Type Additional Details    [] Estim:  []Unatt       []IFC  []Premod                        []Other:  []w/ice   []w/heat  Position:  Location:    [] Estim: []Att    []TENS instruct  []NMES                    []Other:  []w/US   []w/ice   []w/heat  Position:  Location:    []  Traction: [] Cervical       []Lumbar                       [] Prone          []Supine                       []Intermittent   []Continuous Lbs:  [] before manual  [] after manual    []  Ultrasound: []Continuous   [] Pulsed                           []1MHz   []3MHz W/cm2:  Location:    []  Iontophoresis with dexamethasone         Location: [] Take home patch   [] In clinic   10 []  Ice     [x]  heat  []  Ice massage  []  Laser   []  Anodyne Position:seated  Location: l/s    []  Laser with stim  []  Other:  Position:  Location:    [] Vasopneumatic Device Pressure:       [] lo [] med [] hi   Temperature: [] lo [] med [] hi   [x] Skin assessment post-treatment:  [x]intact []redness- no adverse reaction    []redness  adverse reaction:     36 min Therapeutic Exercise:  [x] See flow sheet :   Rationale: increase ROM and increase strength to improve the patients ability to ambulate with decreased hip/back pain          With   [] TE   [] TA   [] neuro   [] other: Patient Education: [x] Review HEP    [] Progressed/Changed HEP based on:   [] positioning   [] body mechanics   [] transfers   [] heat/ice application    [] other:      Other Objective/Functional Measures:   Had pt self correct pelvic alignment for decreased pain L allan and L PI  Educated pt that he would have to do his corrections if he spends a period of time on his feet without shoes on  Very talkative during session making limited progression through exercises  Cues with self MET correct that light correction works just as well as forceful resistance     Pain Level (0-10 scale) post treatment: 6/10    ASSESSMENT/Changes in Function: Pt progressing in therapy with improving gait pattern as heel lift has been added to address LLD. He is still presenting with obliquity due to no wearing shoes at home prolonged periods when walking around. Pt's hip pain limits some tolerance to strengthening. Will continue working on core/hip strength for decreased pain with ambulation and to continue progressing gait pattern wnl. Patient will continue to benefit from skilled PT services to modify and progress therapeutic interventions, address functional mobility deficits, address ROM deficits, address strength deficits, analyze and address soft tissue restrictions, analyze and cue movement patterns, analyze and modify body mechanics/ergonomics, assess and modify postural abnormalities, address imbalance/dizziness and instruct in home and community integration to attain remaining goals. Progress towards goals / Updated goals:  1. Pt will improve FOTO by 14 points in order to demonstrate functional improvement. 2. Pt will improve L hip strength to 4+/5 in order to improve stability for increased weight-bearing tolerance.   3. Pt will demonstrate understanding/compliance with final HEP for continued progression independently upon [facility]    PLAN  [x]  Upgrade activities as tolerated     [x]  Continue plan of care  []  Update interventions per flow sheet       []  Discharge due to:_  []  Other:_      Mariana Deng PTA 9/21/2017  11:03 AM    Future Appointments  Date Time Provider oJseph Barber   9/21/2017 1:00 PM Mariana Deng PTA [facility]   9/25/2017 12:30 PM Mariana Deng PTA [facility]   9/27/2017 11:30 AM Karma Schulz  [address]   9/28/2017 3:30 PM Mariana Deng PTA [facility]

## 2017-09-25 ENCOUNTER — HOSPITAL ENCOUNTER (OUTPATIENT)
Dept: PHYSICAL THERAPY | Age: 37
End: 2017-09-25
Payer: MEDICAID

## 2017-09-26 ENCOUNTER — HOSPITAL ENCOUNTER (OUTPATIENT)
Dept: PHYSICAL THERAPY | Age: 37
Discharge: HOME | End: 2017-09-26
Payer: MEDICAID

## 2017-09-26 PROCEDURE — 97110 THERAPEUTIC EXERCISES: CPT

## 2017-09-26 NOTE — PROGRESS NOTES
PT DAILY TREATMENT NOTE     Patient Name: Kai Marie  Date:2017  : 1980  [x]  Patient  Verified  Payor: [place] / Plan: 1500 / Product Type: Medicaid /    In time: 11:35  Out time:12:17  Total Treatment Time (min): 42  Visit #: 10 of 12    Treatment Area: Spinal stenosis, cervical region [M48.02]    SUBJECTIVE  Pain Level (0-10 scale): 8/10  Any medication changes, allergies to medications, adverse drug reactions, diagnosis change, or new procedure performed?: [x] No    [] Yes (see summary sheet for update)  Subjective functional status/changes:   [] No changes reported  Pt reports increased pain yesterday and over the weekend in his low back. He notes painful at the small bulge he has in his low back that he can't tolerate pressure on when sitting. Pt states he shifts his weight a lot when sitting due to discomfort. He goes back the neurologist this week.      OBJECTIVE    Modality rationale: decrease pain and increase tissue extensibility to improve the patients ability to ambulate with decreased pain   Min Type Additional Details    [] Estim:  []Unatt       []IFC  []Premod                        []Other:  []w/ice   []w/heat  Position:  Location:    [] Estim: []Att    []TENS instruct  []NMES                    []Other:  []w/US   []w/ice   []w/heat  Position:  Location:    []  Traction: [] Cervical       []Lumbar                       [] Prone          []Supine                       []Intermittent   []Continuous Lbs:  [] before manual  [] after manual    []  Ultrasound: []Continuous   [] Pulsed                           []1MHz   []3MHz W/cm2:  Location:    []  Iontophoresis with dexamethasone         Location: [] Take home patch   [] In clinic   10 []  Ice     [x]  heat  []  Ice massage  []  Laser   []  Anodyne Position:seated  Location: l/s    []  Laser with stim  []  Other:  Position:  Location:    []  Vasopneumatic Device Pressure:       [] lo [] med [] hi Temperature: [] lo [] med [] hi   [x] Skin assessment post-treatment:  [x]intact []redness- no adverse reaction    []redness  adverse reaction:       32 min Therapeutic Exercise:  [x] See flow sheet :   Rationale: increase ROM and increase strength to improve the patients ability to improve ease of ambulation with decreased pain          With   [] TE   [] TA   [] neuro   [] other: Patient Education: [x] Review HEP    [] Progressed/Changed HEP based on:   [] positioning   [] body mechanics   [] transfers   [] heat/ice application    [] other:      Other Objective/Functional Measures:   Slight L upslip but no rotation after pt performed leg lengthening and audible pop with shotgun technique non painful  Very talkative during session   Slight L hip pain with piriformis stretch  Good TA contraction  Limited relief of pain with exercises or heat     Pain Level (0-10 scale) post treatment: 7.5/10    ASSESSMENT/Changes in Function: Pt is making slow progress towards goals as he continues to have elevated hip and low back pain. Pt unable to tolerate prolonged standing or sitting for any length of time due to discomfort. He has some improvement in pelvic alignment with correction of LLD but occasionally still has an upslip due to not wearing shoes in his home. Will continue for a few more sessions to develop a final HEP for independent strengthening and allow pt to follow up with MD due to continued elevated pain levels. Patient will continue to benefit from skilled PT services to modify and progress therapeutic interventions, address functional mobility deficits, address ROM deficits, address strength deficits, analyze and address soft tissue restrictions, analyze and cue movement patterns, analyze and modify body mechanics/ergonomics, assess and modify postural abnormalities, address imbalance/dizziness and instruct in home and community integration to attain remaining goals.           Progress towards goals / Updated goals:  1. Pt will improve FOTO by 14 points in order to demonstrate functional improvement. 2. Pt will improve L hip strength to 4+/5 in order to improve stability for increased weight-bearing tolerance.   3. Pt will demonstrate understanding/compliance with final HEP for continued progression independently upon [facility]    PLAN  [x]  Upgrade activities as tolerated     [x]  Continue plan of care  []  Update interventions per flow sheet       []  Discharge due to:_  []  Other:_      Jose Alberto Murillo PTA 9/26/2017  12:51 PM    Future Appointments  Date Time Provider Joseph Barber   9/27/2017 11:30 AM Adore Sosa  [facility]   9/28/2017 3:30 PM Jose Alberto Murillo PTA [id number]

## 2017-09-27 ENCOUNTER — OFFICE VISIT (OUTPATIENT)
Dept: NEUROLOGY | Age: 37
End: 2017-09-27

## 2017-09-27 VITALS
WEIGHT: 130.4 LBS | RESPIRATION RATE: 18 BRPM | DIASTOLIC BLOOD PRESSURE: 60 MMHG | BODY MASS INDEX: 17.66 KG/M2 | OXYGEN SATURATION: 98 % | HEIGHT: 72 IN | HEART RATE: 97 BPM | SYSTOLIC BLOOD PRESSURE: 110 MMHG | TEMPERATURE: 98.1 F

## 2017-09-27 DIAGNOSIS — M48.02 CERVICAL SPINAL STENOSIS: Primary | ICD-10-CM

## 2017-09-27 DIAGNOSIS — R20.0 NUMBNESS AND TINGLING IN LEFT UPPER EXTREMITY: ICD-10-CM

## 2017-09-27 DIAGNOSIS — R20.0 LEFT ARM NUMBNESS: ICD-10-CM

## 2017-09-27 DIAGNOSIS — R20.2 NUMBNESS AND TINGLING IN LEFT UPPER EXTREMITY: ICD-10-CM

## 2017-09-27 RX ORDER — GUAIFENESIN 600 MG/1
600 TABLET, EXTENDED RELEASE ORAL 2 TIMES DAILY
COMMUNITY
End: 2022-06-17 | Stop reason: CLARIF

## 2017-09-27 NOTE — MR AVS SNAPSHOT
Visit Information Date & Time Provider Department Dept. Phone Encounter #  
 9/27/2017 11:30 AM Roly De La Rosa NP [facility] [phone] [id number] Follow-up Instructions Return for follow up with Dr. Chico Mcclendon. No routine follow up advised. Upcoming Health Maintenance Date Due Pneumococcal 19-64 Medium Risk (1 of 1 - PPSV23) 9/9/1997 DTaP/Tdap/Td series (1 - Tdap) 9/9/1999 INFLUENZA AGE 9 TO ADULT 8/1/2017 Allergies as of 9/27/2017  Review Complete On: 9/27/2017 By: Roly De La Rosa NP Severity Noted Reaction Type Reactions Cymbalta [Duloxetine]  05/17/2017    Other (comments) Suicidal thoughts Current Immunizations  Never Reviewed No immunizations on file. Not reviewed this visit You Were Diagnosed With   
  
 Codes Comments Cervical spinal stenosis    -  Primary ICD-10-CM: M48.02 
ICD-9-CM: 723.0 Numbness and tingling in left upper extremity     ICD-10-CM: R20.0, R20.2 ICD-9-CM: 782.0 Left arm numbness     ICD-10-CM: R20.0 ICD-9-CM: 321. 0 Vitals BP Pulse Temp Resp Height(growth percentile) Weight(growth percentile) 110/60 97 98.1 °F (36.7 °C) (Temporal) 18 6' (1.829 m) 130 lb 6.4 oz (59.1 kg) SpO2 BMI Smoking Status 98% 17.69 kg/m2 Light Tobacco Smoker BMI and BSA Data Body Mass Index Body Surface Area  
 17.69 kg/m 2 1.73 m 2 Your Updated Medication List  
  
   
This list is accurate as of: 9/27/17 11:57 AM.  Always use your most recent med list.  
  
  
  
  
 MUCINEX 600 mg ER tablet Generic drug:  guaiFENesin ER Take 600 mg by mouth two (2) times a day. We Performed the Following REFERRAL TO ORTHOPEDIC SURGERY [REF62 Custom] Comments:  
 Please evaluate patient for cervical stenosis. Follow-up Instructions Return for follow up with Dr. Chico Mcclendon. No routine follow up advised.   
  
To-Do List   
 09/28/2017 3:30 PM  
 Appointment with Aarti Clark PTA at [facility] PT [address] [facility] ([phone]) Referral Information Referral ID Referred By Referred To  
  
 [id number] Eusebia Moss MD   
   [address] [place], [facility] [address] Phone: [phone] Fax: [phone] Visits Status Start Date End Date 1 New Request 9/27/17 9/27/18 If your referral has a status of pending review or denied, additional information will be sent to support the outcome of this decision. Introducing [facility]! Dear Rachid: Thank you for requesting a [employer] account. Our records indicate that you already have an active [employer] account. You can access your account anytime at [email or web address] Did you know that you can access your hospital and ER discharge instructions at any time in [employer]? You can also review all of your test results from your hospital stay or ER visit. Additional Information If you have questions, please visit the Frequently Asked Questions section of the [employer] website at [email or web address] Remember, [employer] is NOT to be used for urgent needs. For medical emergencies, dial 911. Now available from your iPhone and Android! Please provide this summary of care documentation to your next provider. Your primary care clinician is listed as Basia Montenegro. If you have any questions after today's visit, please call [phone].

## 2017-09-27 NOTE — PROGRESS NOTES
[employer]  [address] [facility] [address], [address], [place], [address]  [address] [place], [address]  Office:  [phone]  Fax: [phone]  Chief Complaint   Patient presents with    Neurologic Problem     f/u cervical spinal stenosis       This is a 42year old male who presents for follow. He noted improvement in neck pain and associated left arm numbness and tingling. He finished physical therapy recently and endorses some improvement. He still has numbness and tingling down the left arm but it is much better. He denies symptoms down the right arm. He endorses some issues with the left side of his upper back and left shoulder. He endorses abnormal sensation to all of his fingers on the left hand. Denies pain to the neck but feels like there are \"fingers on the back of his neck. \" He was given a heel lift at physical therapy that is helping with his back and hip issues. He wears this today. Otherwise doing well denies focal deficits. Past Medical History:   Diagnosis Date    ADD (attention deficit disorder)     Anemia     iron deficiency    Asthma     Bipolar 1 disorder (HCC)     Congenital hip dysplasia     Headache     Hip dysplasia, congenital     Psychiatric disorder     Scoliosis     Scoliosis        Past Surgical History:   Procedure Laterality Date    HX ORTHOPAEDIC  1980    left thigh       Current Outpatient Prescriptions   Medication Sig Dispense Refill    guaiFENesin ER (MUCINEX) 600 mg ER tablet Take 600 mg by mouth two (2) times a day. Allergies   Allergen Reactions    Cymbalta [Duloxetine] Other (comments)     Suicidal thoughts       Social History   Substance Use Topics    Smoking status: Light Tobacco Smoker    Smokeless tobacco: Former User    Alcohol use No       No family history on file. Review of Systems  Pertinent positives and negatives as noted otherwise comprehensive review is negative.     Examination  Visit Vitals    /60    Pulse 97    Temp 98.1 °F (36.7 °C) (Temporal)    Resp 18    Ht 6' (1.829 m)    Wt 59.1 kg (130 lb 6.4 oz)    SpO2 98%    BMI 17.69 kg/m2     This is a pleasant 42year old male, well appearing in no acute distress. No icterus. Oropharynx clear. Heart regular. No murmur. No edema. Neurologically, he is awake, alert, oriented with normal speech, language, and cognition. He has full versions without nystagmus. Face is symmetrical with symmetrical facial sensation. Tongue and palate are midline. Shoulder shrug is symmetrical. Hearing is intact to conversation. He has normal bulk and tone. There is a slight intention tremor to the left hand on finger nose finger. There is no pronation or drift. Strength 4/5 uppers and 5/5 lowers. Reflexes are symmetrical in the upper and lower extremities bilaterally. He is able to ambulate without difficulty with use of cane. Impression/Plan  Kai Marie is a 42 y.o. male whose history and physical are consistent with cervical stenosis. Patient had made moderate improvement of symptoms with conservative therapy including physical therapy. Symptoms persist including left arm abnormal sensation and some slight neck discomfort. Refer to ortho/spine for evaluation. Discussed sending records to patient's PCP. Discussed plan of care. Patient is agreeable and all questions addressed. No further tests recommended and no routine follow up. Call office with concerns. Diagnoses and all orders for this visit:    1. Cervical spinal stenosis  -     REFERRAL TO ORTHOPEDIC SURGERY    2. Numbness and tingling in left upper extremity  -     REFERRAL TO ORTHOPEDIC SURGERY    3. Left arm numbness  -     REFERRAL TO ORTHOPEDIC SURGERY    Total time: 15 min   Counseling / coordination time: 13 min   > 50% counseling / coordination?: Yes re: symptoms, management, plan of care, and referral.    Pravin Gilmore NP  This note will not be viewable in [employer].

## 2017-09-28 ENCOUNTER — APPOINTMENT (OUTPATIENT)
Dept: PHYSICAL THERAPY | Age: 37
End: 2017-09-28
Payer: MEDICAID

## 2017-10-02 ENCOUNTER — PATIENT MESSAGE (OUTPATIENT)
Dept: ORTHOPEDIC SURGERY | Age: 37
End: 2017-10-02

## 2017-10-02 ENCOUNTER — HOSPITAL ENCOUNTER (OUTPATIENT)
Dept: PHYSICAL THERAPY | Age: 37
Discharge: HOME | End: 2017-10-02
Payer: MEDICAID

## 2017-10-02 PROCEDURE — 97110 THERAPEUTIC EXERCISES: CPT

## 2017-10-02 PROCEDURE — 97140 MANUAL THERAPY 1/> REGIONS: CPT

## 2017-10-02 NOTE — PROGRESS NOTES
PT DAILY TREATMENT NOTE 3-16    Patient Name: Anni Corrales  Date:10/2/2017  : 1980  [x]  Patient  Verified  Payor: [place] / Plan: 1500 / Product Type: Medicaid /    In time:2:30  Out time:3:15  Total Treatment Time (min): 45  Visit #: 5 of 6 (signed PN)    Treatment Area: Spinal stenosis, cervical region [M48.02]    SUBJECTIVE  Pain Level (0-10 scale): 6-6.5/10  Any medication changes, allergies to medications, adverse drug reactions, diagnosis change, or new procedure performed?: [x] No    [] Yes (see summary sheet for update)  Subjective functional status/changes:   [] No changes reported  \"I've been driving more than normal and that hurts my back more because of the 'soft spot' in my back. \"    OBJECTIVE  Modality rationale: decrease pain and increase tissue extensibility to improve the patients ability to ease ADL tolerance   Min Type Additional Details    [] Estim:  []Unatt       []IFC  []Premod                        []Other:  []w/ice   []w/heat  Position:  Location:    [] Estim: []Att    []TENS instruct  []NMES                    []Other:  []w/US   []w/ice   []w/heat  Position:  Location:    []  Traction: [] Cervical       []Lumbar                       [] Prone          []Supine                       []Intermittent   []Continuous Lbs:  [] before manual  [] after manual    []  Ultrasound: []Continuous   [] Pulsed                           []1MHz   []3MHz Location:  W/cm2:    []  Iontophoresis with dexamethasone         Location: [] Take home patch   [] In clinic   10 []  Ice     []  heat  []  Ice massage  []  Laser   []  Anodyne Position: seated  Location: l/s    []  Laser with stim  []  Other: Position:  Location:    []  Vasopneumatic Device Pressure:       [] lo [] med [] hi   Temperature: [] lo [] med [] hi   [x] Skin assessment post-treatment:  [x]intact []redness- no adverse reaction    []redness  adverse reaction:     27 min Therapeutic Exercise:  [x] See flow sheet :   Rationale: increase ROM, increase strength and improve coordination to improve the patients ability to increase ease with ADLs    8 min Manual Therapy:  Left long axis distraction x2 to correct left upslip followed by shotgun technique   Rationale: decrease pain, increase ROM and increase tissue extensibility to correct hip alignment           With   [] TE   [] TA   [] neuro   [] other: Patient Education: [x] Review HEP    [] Progressed/Changed HEP based on:   [] positioning   [] body mechanics   [] transfers   [] heat/ice application    [] other:      Other Objective/Functional Measures:   Left hip grossly: 4/5     Pain Level (0-10 scale) post treatment: 6-6.5/10    ASSESSMENT/Changes in Function:   Patient with no overall change, left hip strength grossly 4/5. Patient continues with moderate/high pain levels and reports that he has been driving more often which has been aggravating the 'soft spot' in his low back. Continues with left upslip due to patient not wearing shoe lift in home. Continue with one remaining visit and D/C with updated HEP. Patient will continue to benefit from skilled PT services to modify and progress therapeutic interventions, address functional mobility deficits, address ROM deficits, address strength deficits, analyze and address soft tissue restrictions, analyze and cue movement patterns, analyze and modify body mechanics/ergonomics and assess and modify postural abnormalities to attain remaining goals. []  See Plan of Care  []  See progress note/recertification  []  See Discharge Summary         Progress towards goals / Updated goals:  1. Pt will improve FOTO by 14 points in order to demonstrate functional improvement. 2. Pt will improve L hip strength to 4+/5 in order to improve stability for increased weight-bearing tolerance.-not met, left hip grossly 4/5 (10/2/2017)  3.  Pt will demonstrate understanding/compliance with final HEP for continued progression independently upon [facility]    PLAN  []  Upgrade activities as tolerated     [x]  Continue plan of care  []  Update interventions per flow sheet       []  Discharge due to:_  []  Other:_      Olu Matthews 10/2/2017  2:33 PM    Future Appointments  Date Time Provider Joseph Barber   10/30/2017 2:00 PM Beltran Patel  [address] [facility]

## 2017-10-06 ENCOUNTER — HOSPITAL ENCOUNTER (OUTPATIENT)
Dept: PHYSICAL THERAPY | Age: 37
Discharge: HOME | End: 2017-10-06
Payer: MEDICAID

## 2017-10-06 PROCEDURE — 97110 THERAPEUTIC EXERCISES: CPT

## 2017-10-06 NOTE — PROGRESS NOTES
PT DAILY TREATMENT NOTE     Patient Name: Lupillo Mukherjee  Date:10/6/2017  : 1980  [x]  Patient  Verified  Payor: [place] / Plan: 1500 / Product Type: Medicaid /    In time:4:05  Out time: 4:38  Total Treatment Time (min): 33  Visit #: 6 of 6    Treatment Area: Spinal stenosis, cervical region [M48.02]    SUBJECTIVE  Pain Level (0-10 scale): 4-10  Any medication changes, allergies to medications, adverse drug reactions, diagnosis change, or new procedure performed?: [x] No    [] Yes (see summary sheet for update)  Subjective functional status/changes:   [] No changes reported  Pt reports he took a hot shower before coming to therapy so he is feeling better today. He states he is able to do his hip corrections at home. He still struggles when he doesn't wear his heel lift and is going to try house slippers with the lift to help. OBJECTIVE    33 min Therapeutic Exercise:  [x] See flow sheet :   Rationale: increase ROM and increase strength to improve the patients ability to ambulate with decreased hip/back pain          With   [] TE   [] TA   [] neuro   [] other: Patient Education: [x] Review HEP    [] Progressed/Changed HEP based on:   [] positioning   [] body mechanics   [] transfers   [] heat/ice application    [] other:      Other Objective/Functional Measures: FOTO: l/s 40 hip 40  Able to independently correct L upslip with leg lengthening and shotgun technique  No further increase in pain  Reviewed final HEP  Discussed heel lift in at all times to decrease amount of times needed for hip corrections    Pain Level (0-10 scale) post treatment: 3-4/10    ASSESSMENT/Changes in Function: Pt made slow progress towards final goals in therapy. He continued to have fluctuating pain levels in the hip and low back likely due to degenerative changes from length of compensating for LLD.  He had some improvement overall in hip strength and is independent with self pelvic corrections. Will D/C at this time to allow pt to independently continue strengthening at home with updated HEP. []  See Plan of Care  []  See progress note/recertification  [x]  See Discharge Summary         Progress towards goals / Updated goals:  1. Pt will improve FOTO by 14 points in order to demonstrate functional improvement. Not met  2. Pt will improve L hip strength to 4+/5 in order to improve stability for increased weight-bearing tolerance.-not met, left hip grossly 4/5 (10/2/2017)  3. Pt will demonstrate understanding/compliance with final HEP for continued progression independently upon DC Met    PLAN  []  Upgrade activities as tolerated     []  Continue plan of care  []  Update interventions per flow sheet       [x]  Discharge due to: Independent with pelvic correction. Independent with home exercises.   []  Other:_      Bertha Aguayo PTA 10/6/2017  4:08 PM    Future Appointments  Date Time Provider Joseph Braber   10/30/2017 2:00 PM Nithin Bashir  [address]

## 2017-10-18 ENCOUNTER — TELEPHONE (OUTPATIENT)
Dept: NEUROLOGY | Age: 37
End: 2017-10-18

## 2017-10-18 NOTE — PROGRESS NOTES
[facility] Adenike Borja  [address] [facility]  [phone] fax    Physical Therapy Discharge Summary    Patient name: Lori Burns Start of Care: 17   Referral source: Pooja Malloy NP : 1980   Medical/Treatment Diagnosis: Spinal stenosis, cervical region [M48.02] Onset Date:\"a long time ago\"     Prior Hospitalization: see medical history Provider#: [id number]   Medications: Verified on Patient Summary List     Comorbidities: fetal alcohol syndrome, asthma, scoliosis, weight change > 10 lbs, arthritis, depression, tobacco use (1 pack every 3 days), bipolar   Prior Level of Function: lives with friends in a 1 story home with HR at entry steps, [employer] for recreational time       Visits from [place] of Care: 12    Missed Visits: 2    Reporting Period : 17 to 10/6/17    Summary of Care:  Goal: Pt will improve FOTO by 14 points in order to demonstrate functional improvement. Status at last note/certification: Not met. Hip FOTO improved 1 point, Lumbar FOTO decreased 8 points  Status at discharge: not met    Goal: Pt will improve L hip strength to 4+/5 in order to improve stability for increased weight-bearing tolerance  Status at last note/certification: Not met. L hip strength grossly 4/5  Status at discharge: not met    Goal: Pt will demonstrate understanding/compliance with final HEP for continued progression independently upon DC  Status at last note/certification: Goal met. Status at discharge: met      ASSESSMENT/RECOMMENDATIONS:  Pt has made slow progress towards final goals in therapy, meeting 1/3 final goals. Pain levels continue to fluctuate likely d/t degenerative changes resulting from leg length discrepancy. Pt has slowly improved hip strength and is independent with self corrections for continued pelvic obliquity. Pt has made limited functional improvements and is DC at this time d/t lack of progress.      [x]Discontinue therapy: []Patient has reached or is progressing toward set goals      []Patient is non-compliant or has abdicated      [x]Due to lack of appreciable progress towards set goals    Syed Hopson, PT 10/18/2017 9:52 AM      NOTE TO PHYSICIAN:  Please complete the following and fax to: [facility] at [facility] at [phone]  Retain this original for your records. If you are unable to process this request in   24 hours, please contact our office.      [] I have read the above report and request that my patient continue therapy with the following changes/special instructions:  [] I have read the above report and request that my patient be discharged from therapy    Physician's Signature:_____________________ Date:___________Time:__________

## 2017-10-30 ENCOUNTER — OFFICE VISIT (OUTPATIENT)
Dept: ORTHOPEDIC SURGERY | Age: 37
End: 2017-10-30

## 2017-10-30 VITALS
SYSTOLIC BLOOD PRESSURE: 120 MMHG | TEMPERATURE: 98.2 F | BODY MASS INDEX: 17.56 KG/M2 | WEIGHT: 129.6 LBS | HEART RATE: 97 BPM | RESPIRATION RATE: 14 BRPM | HEIGHT: 72 IN | OXYGEN SATURATION: 97 % | DIASTOLIC BLOOD PRESSURE: 79 MMHG

## 2017-10-30 DIAGNOSIS — M54.12 CERVICAL RADICULOPATHY: Primary | ICD-10-CM

## 2017-10-30 DIAGNOSIS — M79.18 MYOFASCIAL PAIN: ICD-10-CM

## 2017-10-30 NOTE — MR AVS SNAPSHOT
Visit Information Date & Time Provider Department Dept. Phone Encounter #  
 10/30/2017  2:00 PM Glo Cadena MD [facility] [phone] [id number] Follow-up Instructions Return with Dr. Arpita Earl. Upcoming Health Maintenance Date Due Pneumococcal 19-64 Medium Risk (1 of 1 - PPSV23) 9/9/1997 DTaP/Tdap/Td series (1 - Tdap) 9/9/1999 INFLUENZA AGE 9 TO ADULT 8/1/2017 Allergies as of 10/30/2017  Review Complete On: 10/30/2017 By: Tip Madison LPN Severity Noted Reaction Type Reactions Cymbalta [Duloxetine]  05/17/2017    Other (comments) Suicidal thoughts Current Immunizations  Never Reviewed No immunizations on file. Not reviewed this visit You Were Diagnosed With   
  
 Codes Comments Cervical radiculopathy    -  Primary ICD-10-CM: M54.12 
ICD-9-CM: 723.4 Myofascial pain     ICD-10-CM: M79.1 ICD-9-CM: 729.1 Vitals BP Pulse Temp Resp Height(growth percentile) Weight(growth percentile) 120/79 97 98.2 °F (36.8 °C) 14 6' (1.829 m) 129 lb 9.6 oz (58.8 kg) SpO2 BMI Smoking Status 97% 17.58 kg/m2 Light Tobacco Smoker BMI and BSA Data Body Mass Index Body Surface Area  
 17.58 kg/m 2 1.73 m 2 Your Updated Medication List  
  
   
This list is accurate as of: 10/30/17  4:19 PM.  Always use your most recent med list.  
  
  
  
  
 MUCINEX 600 mg ER tablet Generic drug:  guaiFENesin ER Take 600 mg by mouth two (2) times a day. Follow-up Instructions Return with Dr. Arpita Earl. Introducing [facility]! Dear Alphonso Resendiz: Thank you for requesting a [employer] account. Our records indicate that you already have an active [employer] account. You can access your account anytime at [email or web address] Did you know that you can access your hospital and ER discharge instructions at any time in [employer]? You can also review all of your test results from your hospital stay or ER visit. Additional Information If you have questions, please visit the Frequently Asked Questions section of the [employer] website at [email or web address] Remember, [employer] is NOT to be used for urgent needs. For medical emergencies, dial 911. Now available from your iPhone and Android! Please provide this summary of care documentation to your next provider. Your primary care clinician is listed as Benny Actdalia. If you have any questions after today's visit, please call [phone].

## 2017-10-30 NOTE — PROGRESS NOTES
[address]  [facility], [address]  Phone: [phone]  Fax: [phone]        Aicha Client  : 1980  PCP: Santa Monreal MD  10/30/2017    NEW PATIENT       ASSESSMENT AND PLAN     Darris Dancer comes in to the office today c/o chronic neck pain with radicular symptoms in the left upper extremity. His symptoms may be related to cervical radiculopathy. The cervical MRI showed multilevel foraminal stenosis most pronounced left-sided foraminal stenosis at C6/7 and C5/6. Overall, I do not believe that the majority of his symptoms are caused by this structural abnormality. He has widespread pain, most likely related to central pain sensitization with a myofascial pain generator. The chronicity makes his prognosis guarded. On the examination, he had tenderness along the axial spine. We discussed treatment options at this time. He deferred cervical interlaminar injections due to the risks involved. I referred him to Dr. Christina Caceres for a surgical consult. Pt was consulted on his smoking habits. We discussed the risks of smoking and he was advised to quit to improve his health. Pt will f/u with Dr. Christina Caceres. Diagnoses and all orders for this visit:    1. Cervical radiculopathy    2. Myofascial pain         Follow-up Disposition:  Return with Dr. Christina Caceres. [address] is seen today in consultation at the request of Santa Monreal MD for complaints of chronic neck pain. HISTORY OF PRESENT ILLNESS  Darris Dancer is a 42 y.o. male c/o chronic neck pain that radiates into the left upper ext. He has been treated with PT which ended on 10/06/2017 which slightly improved his symptoms from a 9/10 to an 8/10. He has undergone an BUE EMG which did not specifically show cervical radiculopathy. He also has had a cervical MRI which was unremarkable.  The radicular symptoms include a numbness, tingling, and burning sensation. He reports these symptoms have been going on for the last 10 years. He has been evaluated by a Neurologist who relates his symptoms to cervical radiculopathy. He reports that lying in the supine position will help alleviate his pain. He reports a previous symptoms of lower extremity numbness which affects his gait. He primarily uses a cane for this. Pt denies any fevers, chills, nausea, vomiting. Pt denies any chest pain and shortness of breath. Pt denies any ear, nose, and throat problems. Pt denies any fecal or urinary incontinence. Accompanied by his mother. He is a smoker. PAST MEDICAL HISTORY   Past Medical History:   Diagnosis Date    ADD (attention deficit disorder)     Anemia     iron deficiency    Asthma     Bipolar 1 disorder (HCC)     Congenital hip dysplasia     Headache     Hip dysplasia, congenital     Psychiatric disorder     Scoliosis     Scoliosis        Past Surgical History:   Procedure Laterality Date    HX ORTHOPAEDIC  1980    left thigh       MEDICATIONS      Current Outpatient Prescriptions   Medication Sig Dispense Refill    guaiFENesin ER (MUCINEX) 600 mg ER tablet Take 600 mg by mouth two (2) times a day.          ALLERGIES    Allergies   Allergen Reactions    Cymbalta [Duloxetine] Other (comments)     Suicidal thoughts          SOCIAL HISTORY    Social History     Social History    Marital status: SINGLE     Spouse name: N/A    Number of children: N/A    Years of education: N/A     Social History Main Topics    Smoking status: Light Tobacco Smoker    Smokeless tobacco: Former User    Alcohol use No    Drug use: Yes     Special: Marijuana    Sexual activity: Not Asked     Other Topics Concern    None     Social History Narrative     Social History Narrative      Problem Relation Age of Onset    Other Other      ALS       REVIEW OF SYSTEMS  Review of Systems   Constitutional: Negative for chills, diaphoresis, fever, malaise/fatigue and weight loss. Respiratory: Negative for shortness of breath. Cardiovascular: Negative for chest pain and leg swelling. Gastrointestinal: Negative for constipation, nausea and vomiting. Neurological: Negative for dizziness, tingling, seizures, loss of consciousness, weakness and headaches. Psychiatric/Behavioral: The patient does not have insomnia. PHYSICAL EXAMINATION  Visit Vitals    /79    Pulse 97    Temp 98.2 °F (36.8 °C)    Resp 14    Ht 6' (1.829 m)    Wt 129 lb 9.6 oz (58.8 kg)    SpO2 97%    BMI 17.58 kg/m2       No flowsheet data found. Constitutional:  Well developed, well nourished, in no acute distress. Psychiatric: Affect and mood are appropriate. HEENT: Normocephalic, atraumatic. Extraocular movements intact. Integumentary: No rashes or abrasions noted on exposed areas. Cardiovascular: Regular rate and rhythm. Pulmonary: Clear to auscultation bilaterally. SPINE/MUSCULOSKELETAL EXAM    Cervical spine:  Neck is midline. Normal muscle tone. No focal atrophy is noted. ROM pain free. Shoulder ROM intact. Tenderness to palpation. Negative Spurling's sign. Negative Tinel's sign. Negative Hernández's sign. Sensation in the bilateral legs grossly intact to light touch. Lumbar spine:  No rash, ecchymosis, or gross obliquity. No fasciculations. No focal atrophy is noted. No pain with hip ROM. Full range of motion. Tenderness to palpation. No tenderness to palpation at the sciatic notch. SI joints non-tender. Trochanters non tender. Sensation in the bilateral legs grossly intact to light touch.     MOTOR:      Biceps  Triceps Deltoids Wrist Ext Wrist Flex Hand Intrin   Right 5/5 5/5 5/5 5/5 5/5 5/5   Left 5/5 5/5 5/5 5/5 5/5 5/5             Hip Flex  Quads Hamstrings Ankle DF EHL Ankle PF   Right 5/5 5/5 5/5 5/5 5/5 5/5   Left 5/5 5/5 5/5 5/5 5/5 5/5     DTRs are 1+ biceps, triceps, brachioradialis, patella, and Achilles. Negative Straight Leg raise. Squat not tested. No difficulty with tandem gait. Ambulation with single point cane. FWB. RADIOGRAPHS  BUE EMG taken on 06/21/2017 personally reviewed with patient:  NCS/EMG FINDINGS:     · Evaluation of the Left median motor nerve showed prolonged distal onset latency (4.7 ms). · The Right median motor, the Left ulnar motor, and the Right ulnar motor nerves were unremarkable. · The Left Median 2nd Digit sensory nerve showed prolonged distal peak latency (3.8 ms) and decreased conduction velocity (Wrist-2nd Digit, 46.4 m/s). · The Right Median 2nd Digit sensory nerve showed prolonged distal peak latency (3.6 ms). · The Left ulnar sensory and the Right ulnar sensory nerves showed prolonged distal peak latency (L3.3, R3.3 ms) and decreased conduction velocity (Wrist-5th Digit, L44.0, R47.8 m/s).       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 Cervical MRI images taken on 06/05/2017 personally reviewed with patient:  FINDINGS:       Normal alignment. Vertebral body heights are maintained. Multilevel endplate  osteophytes. No mild disc height loss C6/7. No suspicious bone marrow lesion. No  prevertebral soft tissue abnormality. Normal craniocervical junction. Normal  cervical cord in signal and morphology.     Correlation of sagittal and axial images at the level of the discs demonstrates  the following:   C2-C3: No significant disc pathology. Patent central canal and neural foramina. C3-C4: Mild posterior disc osteophyte with asymmetric right uncovertebral disc  osteophyte resulting in mild right foraminal stenosis.  Patent central canal.  Mild right facet hypertrophy. C4-C5:Mild posterior disc osteophyte with right greater than left mild  uncovertebral spurring. Mild right facet hypertrophy. Mild right foraminal  stenosis. Patent central canal.  C5-C6: Mild disc osteophyte complex and prominent uncovertebral spurring. No  facet hypertrophy. Mild right and moderate left foraminal stenosis. Patent  central canal  C6-C7: Moderate disc osteophyte complex effacing ventral CSF space. Mild  uncovertebral spurring. Mild central canal stenosis. Mild right and mild to  moderate left foraminal stenosis. C7-T1: No significant disc pathology. Patent central canal and foramina  T1-T2:  No significant disc pathology. Patent central canal and foramina.     Remainder of the upper thoracic spine to the level of T5/T6 demonstrate  unremarkable disc space patent central canal and foramina.     Incidentally noted benign-appearing mucosal sinus disease in the right maxillary  sinus.     IMPRESSION  Impression:     1. Multilevel mild degenerative changes throughout the cervical spine most  pronounced at C6/7 disc where there is mild central canal stenosis. 2. Multilevel foraminal stenosis most pronounced left-sided foraminal stenosis  at C6/7 and C5/6 as described, likely cause of left cervical radiculopathy.  reviewed    Mr. Zuniga has a reminder for a \"due or due soon\" health maintenance. I have asked that he contact his primary care provider for follow-up on this health maintenance. This plan was reviewed with the patient and patient agrees. All questions were answered. More than half of this visit today was spent on counseling. Written by Francisco J Sue, as dictated by Dr. Abel Riley. I, Dr. Abel Riley, confirm that all documentation is accurate.

## 2017-11-13 ENCOUNTER — TELEPHONE (OUTPATIENT)
Dept: NEUROLOGY | Age: 37
End: 2017-11-13

## 2017-11-14 ENCOUNTER — OFFICE VISIT (OUTPATIENT)
Dept: ORTHOPEDIC SURGERY | Age: 37
End: 2017-11-14

## 2017-11-14 VITALS
HEART RATE: 84 BPM | DIASTOLIC BLOOD PRESSURE: 79 MMHG | SYSTOLIC BLOOD PRESSURE: 125 MMHG | TEMPERATURE: 98.3 F | WEIGHT: 133 LBS | BODY MASS INDEX: 18.04 KG/M2 | RESPIRATION RATE: 17 BRPM

## 2017-11-14 DIAGNOSIS — M54.12 CERVICAL RADICULOPATHY: Primary | ICD-10-CM

## 2017-11-14 DIAGNOSIS — M48.02 FORAMINAL STENOSIS OF CERVICAL REGION: ICD-10-CM

## 2017-11-14 NOTE — PROGRESS NOTES
[address], [address]  [place], [address] [place] [address] Street  Phone: [phone]  Fax: [phone]  INITIAL CONSULTATION  Patient: Walt Daniel                MRN: [id number]       SSN: [id number]  YOB: 1980        AGE: 42 y.o. SEX: male  Body mass index is 18.04 kg/(m^2). PCP: Azael Durand MD  11/14/17    Chief Complaint   Patient presents with    Neck Pain     MRI follow up         HISTORY OF PRESENT ILLNESS, RADIOGRAPHS, and PLAN:         HISTORY OF PRESENT ILLNESS:  Mr. Janell Nunez is seen today at the request of Dr. Cinthia Gitelman and Dr. Mei Stanford. Mr. Janell Nunez is a 64-year-old male with history of chronic pain, bipolar disorder, asthma, smoker, and hip dysplasia. He comes in with chronic neck pain. He denies bowel or bladder dysfunction, fevers, chills, night sweats, weight loss or weight gain. He comes in requesting if there is any type of surgical intervention. PHYSICAL EXAMINATION:  Demonstrates an emaciated appearance with diffuse wasting in his hips, back, arms and legs. He has no objective neurologic deficit. RADIOGRAPHS:   His MRI of the cervical spine demonstrates diffuse degenerative changes without instability. No focal stenosis. ASSESSMENT/PLAN: I explained to the patient at length that he has no focal stenosis and no instability and no nerve root compression, and he has had chronic neck pain. My recommendation would be a holistic approach to his problems with an ongoing exercise program, consideration of non-narcotic intervention, and other holistic interventions. He can obtain a TENS unit and consider home traction and other similar intervention. I would try to not medicalize things and maintain a distance from other such aggressive interventions. He has sought medical intervention with multiple episodes and they have never helped him. He has already been through physical therapy and he knows the exercises.  He seemed to be relayed to this very well. I explained to him that he does not have the small subset of things that surgery would help with with regards to pain. I wish I could help him, but I really do not see anything in his presentation that is addressable. I would be happy to see him if things change, but at this point, I saw nothing in his presentation that was surgical. I would recommend an ongoing exercise program.         cc: Abby Christopher. Mei Stanford M.D. Past Medical History:   Diagnosis Date    ADD (attention deficit disorder)     Anemia     iron deficiency    Asthma     Bipolar 1 disorder (HCC)     Congenital hip dysplasia     Headache     Hip dysplasia, congenital     Psychiatric disorder     Scoliosis     Scoliosis        Family History   Problem Relation Age of Onset    Other Other      ALS       Current Outpatient Prescriptions   Medication Sig Dispense Refill    guaiFENesin ER (MUCINEX) 600 mg ER tablet Take 600 mg by mouth two (2) times a day. Allergies   Allergen Reactions    Cymbalta [Duloxetine] Other (comments)     Suicidal thoughts       Past Surgical History:   Procedure Laterality Date    HX ORTHOPAEDIC  1980    left thigh       Past Medical History:   Diagnosis Date    ADD (attention deficit disorder)     Anemia     iron deficiency    Asthma     Bipolar 1 disorder (HCC)     Congenital hip dysplasia     Headache     Hip dysplasia, congenital     Psychiatric disorder     Scoliosis     Scoliosis        Social History     Social History    Marital status: SINGLE     Spouse name: N/A    Number of children: N/A    Years of education: N/A     Occupational History    Not on file.      Social History Main Topics    Smoking status: Light Tobacco Smoker    Smokeless tobacco: Former User    Alcohol use No    Drug use: Yes     Special: Marijuana    Sexual activity: Not on file     Other Topics Concern    Not on file     Social History Narrative REVIEW OF SYSTEMS:   CONSTITUTIONAL SYMPTOMS:  Negative. EYES:  Negative. EARS, NOSE, THROAT AND MOUTH:  Negative. CARDIOVASCULAR:  Negative. RESPIRATORY:  Negative. GENITOURINARY: Negative. GASTROINTESTINAL:  Negative. INTEGUMENTARY (SKIN AND/OR BREAST):  Negative. MUSCULOSKELETAL: Per HPI.   ENDOCRINE/RHEUMATOLOGIC:  Negative. NEUROLOGICAL:  Per HPI. HEMATOLOGIC/LYMPHATIC:  Negative. ALLERGIC/IMMUNOLOGIC:  Negative. PSYCHIATRIC:  Negative. PHYSICAL EXAMINATION:   Visit Vitals    /79 (BP 1 Location: Left arm, BP Patient Position: Sitting)    Pulse 84    Temp 98.3 °F (36.8 °C) (Oral)    Resp 17    Wt 133 lb (60.3 kg)    BMI 18.04 kg/m2    PAIN SCALE: 8/10    CONSTITUTIONAL: The patient is in no apparent distress and is alert and oriented x 3. HEENT: Normocephalic. Hearing grossly intact. NECK: Supple and symmetric. no tenderness, or masses were felt. RESPIRATORY: No labored breathing. CARDIOVASCULAR: The carotid pulses were normal. Peripheral pulses were 2+. CHEST: Normal AP diameter and normal contour without any kyphoscoliosis. LYMPHATIC: No lymphadenopathy was appreciated in the neck, axillae or groin. SKIN:  Negative for scars, rashes, lesions, or ulcers on the right upper, right lower, left upper, left lower and trunk. NEUROLOGICAL: Alert and oriented x 3. Imbalance. Ambulation with single point cane. FWB. EXTREMITIES:  See musculoskeletal.  MUSCULOSKELETAL:   Head and Neck: Neck pain with radiating LUE pain. Negative for misalignment, asymmetry, crepitation, defects, tenderness masses or effusions.  Left Upper Extremity: Radiating pain. Paresthesias. Inspection, percussion and palpation preformed. Hernándezs sign is negative.  Right Upper Extremity: Paresthesias. Inspection, percussion and palpation preformed. Hernándezs sign is negative.  Spine, Ribs and Pelvis: Inspection, percussion and palpation preformed.  Negative for misalignment, asymmetry, crepitation, defects, tenderness masses or effusions.  Left Lower Extremity: Inspection, percussion and palpation preformed. Negative straight leg raise.  Right Lower Extremity: Inspection, percussion and palpation preformed. Negative straight leg raise. SPINE EXAM:     Cervical spine: Neck is midline. Normal muscle tone. No focal atrophy is noted. ASSESSMENT    ICD-10-CM ICD-9-CM    1. Cervical radiculopathy M54.12 723.4 AMB POC XRAY, SPINE, CERVICAL; 4+ VIE   2. Foraminal stenosis of cervical region M99.81 723.0        Written by Margreta Boas, as dictated by Abimael Lopez MD.    I, Dr. Abimael Lopez MD, confirm that all documentation is accurate.

## 2017-11-14 NOTE — MR AVS SNAPSHOT
Visit Information Date & Time Provider Department Dept. Phone Encounter #  
 11/14/2017  1:30 PM Dorys Washington MD [facility] [phone] [id number] Follow-up Instructions Return if symptoms worsen or fail to improve. Upcoming Health Maintenance Date Due Pneumococcal 19-64 Medium Risk (1 of 1 - PPSV23) 9/9/1997 DTaP/Tdap/Td series (1 - Tdap) 9/9/1999 Influenza Age 5 to Adult 8/1/2017 Allergies as of 11/14/2017  Review Complete On: 11/14/2017 By: Dorys Washington MD  
  
 Severity Noted Reaction Type Reactions Cymbalta [Duloxetine]  05/17/2017    Other (comments) Suicidal thoughts Current Immunizations  Never Reviewed No immunizations on file. Not reviewed this visit You Were Diagnosed With   
  
 Codes Comments Cervical radiculopathy    -  Primary ICD-10-CM: M54.12 
ICD-9-CM: 723.4 Foraminal stenosis of cervical region     ICD-10-CM: M99.81 ICD-9-CM: 723.0 Vitals BP Pulse Temp Resp Weight(growth percentile) BMI  
 125/79 (BP 1 Location: Left arm, BP Patient Position: Sitting) 84 98.3 °F (36.8 °C) (Oral) 17 133 lb (60.3 kg) 18.04 kg/m2 Smoking Status Light Tobacco Smoker Vitals History BMI and BSA Data Body Mass Index Body Surface Area 18.04 kg/m 2 1.75 m 2 Your Updated Medication List  
  
   
This list is accurate as of: 11/14/17  3:34 PM.  Always use your most recent med list.  
  
  
  
  
 MUCINEX 600 mg ER tablet Generic drug:  guaiFENesin ER Take 600 mg by mouth two (2) times a day. We Performed the Following AMB POC XRAY, SPINE, CERVICAL; 4+ VIE [86556 CPT(R)] Follow-up Instructions Return if symptoms worsen or fail to improve. Introducing [address]! Dear Zohra Olson: Thank you for requesting a [employer] account.   Our records indicate that you already have an active [employer] account. You can access your account anytime at [email or web address] Did you know that you can access your hospital and ER discharge instructions at any time in [employer]? You can also review all of your test results from your hospital stay or ER visit. Additional Information If you have questions, please visit the Frequently Asked Questions section of the [employer] website at [email or web address] Remember, [employer] is NOT to be used for urgent needs. For medical emergencies, dial 911. Now available from your iPhone and Android! Please provide this summary of care documentation to your next provider. Your primary care clinician is listed as Guerrero Mooney. If you have any questions after today's visit, please call [phone].

## 2019-05-30 ENCOUNTER — OFFICE VISIT (OUTPATIENT)
Dept: ORTHOPEDIC SURGERY | Facility: CLINIC | Age: 39
End: 2019-05-30

## 2019-05-30 VITALS
TEMPERATURE: 97.8 F | HEIGHT: 72 IN | RESPIRATION RATE: 16 BRPM | OXYGEN SATURATION: 97 % | DIASTOLIC BLOOD PRESSURE: 66 MMHG | BODY MASS INDEX: 17.69 KG/M2 | WEIGHT: 130.6 LBS | SYSTOLIC BLOOD PRESSURE: 112 MMHG | HEART RATE: 91 BPM

## 2019-05-30 DIAGNOSIS — M54.5 CHRONIC MIDLINE LOW BACK PAIN, WITH SCIATICA PRESENCE UNSPECIFIED: ICD-10-CM

## 2019-05-30 DIAGNOSIS — M16.12 PRIMARY OSTEOARTHRITIS OF LEFT HIP: ICD-10-CM

## 2019-05-30 DIAGNOSIS — M21.70 LEG LENGTH DISCREPANCY: ICD-10-CM

## 2019-05-30 DIAGNOSIS — Q65.89 HIP DYSPLASIA, CONGENITAL: ICD-10-CM

## 2019-05-30 DIAGNOSIS — G89.29 CHRONIC MIDLINE LOW BACK PAIN, WITH SCIATICA PRESENCE UNSPECIFIED: ICD-10-CM

## 2019-05-30 DIAGNOSIS — M25.552 LEFT HIP PAIN: Primary | ICD-10-CM

## 2019-05-30 RX ORDER — ALBUTEROL SULFATE 90 UG/1
2 AEROSOL, METERED RESPIRATORY (INHALATION) AS NEEDED
COMMUNITY

## 2019-05-30 RX ORDER — ALPRAZOLAM 0.5 MG/1
TABLET ORAL AS NEEDED
COMMUNITY

## 2019-05-30 RX ORDER — BACLOFEN 10 MG/1
TABLET ORAL 3 TIMES DAILY
COMMUNITY
End: 2022-06-17 | Stop reason: CLARIF

## 2019-05-30 NOTE — PROGRESS NOTES
Patient: Wagner Rosales                MRN: [id number]       SSN: [id number]  YOB: 1980        AGE: 34 y.o. SEX: male    PCP: Nando Pimentel MD  05/30/19    Chief Complaint   Patient presents with    Hip Pain     Left hip pain      HISTORY:  Wagner Rosales is a 34 y.o. male who is seen for left hip and back pain. He has a h/o congenital left hip dysplasia. He has been experiencing increasing left hip pain for the past 3 years. There is no history injury. He now feels a grinding bone on bone feeling in his left hip. He was previously told that he will eventually need a hip replacement. He had prior pinning of his proximal femur from multiple prior surgeries. His prior orthopedic surgeons told him that his retained pin might cause difficulty with future surgical intervention. He notes that his pain has severely impacted his life recently. He notes pain with standing and walking. He experiences severe groin pain and has some pain radiating to his left knee. He experiences severe startup pain after sitting. He has tried prior left hip cortisone injections. He uses a right heel lift for his leg length discrepancy. He notes back pain due to his     Pain Assessment  5/30/2019   Location of Pain Hip   Location Modifiers Left   Severity of Pain 4   Quality of Pain Dull   Quality of Pain Comment -   Duration of Pain Persistent   Frequency of Pain Constant   Aggravating Factors Walking;Stairs;Squatting;Standing   Limiting Behavior Yes   Relieving Factors Rest   Relieving Factors Comment -   Result of Injury No     Occupation, etc:  Mr. [employer]  receives social security disability benefits related to his hip since 2007. He was previously working as a  at [employer] and as a  at [email or web address] in [place], [place]. He moved to this area when his father passed away in 2010. He now lives with his male friend in [facility].  He has close female friend who he considers family--with him today. Mr. Sara Davis weighs 130 lbs and is 6' tall. No results found for: HBA1C, HGBE8, KEG6HGDL, IUI0XLLU, SNH5TSDE  Weight Metrics 5/30/2019 11/14/2017 10/30/2017 9/27/2017 6/27/2017 5/17/2017   Weight 130 lb 9.6 oz 133 lb 129 lb 9.6 oz 130 lb 6.4 oz 124 lb 12.8 oz 122 lb 9.6 oz   BMI 17.71 kg/m2 18.04 kg/m2 17.58 kg/m2 17.69 kg/m2 16.93 kg/m2 16.63 kg/m2       Patient Active Problem List   Diagnosis Code    Cervical radiculopathy M54.12     REVIEW OF SYSTEMS: All Below are Negative except: See HPI   Constitutional: negative for fever, chills, and weight loss. Cardiovascular: negative for chest pain, claudication, leg swelling, SOB, MIMS   Gastrointestinal: Negative for pain, N/V/C/D, Blood in stool or urine, dysuria, hematuria, incontinence, pelvic pain. Musculoskeletal: See HPI   Neurological: Negative for dizziness and weakness. Negative for headaches, Visual changes, confusion, seizures   Phychiatric/Behavioral: Negative for depression, memory loss, substance abuse. Extremities: Negative for hair changes, rash, or skin lesion changes. Hematologic: Negative for bleeding problems, bruising, pallor or swollen lymph nodes   Peripheral Vascular: No calf pain, no circulation deficits.     Social History     Socioeconomic History    Marital status: SINGLE     Spouse name: Not on file    Number of children: Not on file    Years of education: Not on file    Highest education level: Not on file   Occupational History    Not on file   Social Needs    Financial resource strain: Not on file    Food insecurity:     Worry: Not on file     Inability: Not on file    Transportation needs:     Medical: Not on file     Non-medical: Not on file   Tobacco Use    Smoking status: Light Tobacco Smoker    Smokeless tobacco: Former User   Substance and Sexual Activity    Alcohol use: No    Drug use: Yes     Types: Marijuana    Sexual activity: Not on file   Lifestyle    Physical activity: Days per week: Not on file     Minutes per session: Not on file    Stress: Not on file   Relationships    Social connections:     Talks on phone: Not on file     Gets together: Not on file     Attends [employer] service: Not on file     Active member of club or organization: Not on file     Attends meetings of clubs or organizations: Not on file     Relationship status: Not on file    Intimate partner violence:     Fear of current or ex partner: Not on file     Emotionally abused: Not on file     Physically abused: Not on file     Forced sexual activity: Not on file   Other Topics Concern    Not on file   Social History Narrative    Not on file      Allergies   Allergen Reactions    Cymbalta [Duloxetine] Other (comments)     Suicidal thoughts      Current Outpatient Medications   Medication Sig    albuterol (PROVENTIL VENTOLIN) 2.5 mg /3 mL (0.083 %) nebulizer solution by Nebulization route once.  alprazolam (XANAX PO) Take  by mouth.  baclofen (LIORESAL) 10 mg tablet Take  by mouth three (3) times daily.  guaiFENesin ER (MUCINEX) 600 mg ER tablet Take 600 mg by mouth two (2) times a day. No current facility-administered medications for this visit. PHYSICAL EXAMINATION:  Visit Vitals  /66   Pulse 91   Temp 97.8 °F (36.6 °C) (Oral)   Resp 16   Ht 6' (1.829 m)   Wt 130 lb 9.6 oz (59.2 kg)   SpO2 97%   BMI 17.71 kg/m²      ORTHO EXAMINATION:  Examination Right hip Left hip   Skin Intact Intact   External Rotation ROM 20 15   Internal Rotation ROM 10 15   Trochanteric tenderness - +   Hip flexion contracture - -   Antalgic gait - -   Trendelenberg sign - -   Lumbar tenderness - -   Straight leg raise - -   Calf tenderness - -   Neurovascular Intact Intact   Using a cane in right hand  2 cm L>R left leg discrepancy     RADIOGRAPHS:  XR LEFT HIP 5/30/19 MARY  IMPRESSION:  AP pelvis and two views - No fractures, severe joint space narrowing, + osteophytes present. Tonnis grade 3.  H/o hip dysplasia. XR LUMBAR 5/30/19 MARY  IMPRESSION:  Two views - no fractures, no disc space narrowing, no osteophytes present, no spondylolisthesis. IMPRESSION:      ICD-10-CM ICD-9-CM    1. Left hip pain M25.552 719.45 AMB POC X-RAY RADEX HIP UNI WITH PELVIS 2-3 VIEWS      REFERRAL TO ORTHOPEDICS      REFERRAL TO PHYSICAL THERAPY   2. Chronic midline low back pain, with sciatica presence unspecified M54.5 724.2 AMB POC XRAY, SPINE, LUMBOSACRAL; 2 O    G89.29 338.29 REFERRAL TO PHYSICAL THERAPY   3. Hip dysplasia, congenital Q65.89 755.63 REFERRAL TO ORTHOPEDICS      REFERRAL TO PHYSICAL THERAPY   4. Primary osteoarthritis of left hip M16.12 715.15 REFERRAL TO ORTHOPEDICS      REFERRAL TO PHYSICAL THERAPY   5. Leg length discrepancy M21.70 736.81 REFERRAL TO ORTHOPEDICS      REFERRAL TO PHYSICAL THERAPY     PLAN:  [place] [facility]. He will start a brief course of outpatient physical therapy. Heel lift provided.        Scribed by Tobin Ya ([address]) as dictated by Marzena Duff MD

## 2019-06-12 ENCOUNTER — HOSPITAL ENCOUNTER (OUTPATIENT)
Dept: PHYSICAL THERAPY | Age: 39
Discharge: HOME | End: 2019-06-12
Payer: MEDICAID

## 2019-06-12 PROCEDURE — 97162 PT EVAL MOD COMPLEX 30 MIN: CPT

## 2019-06-12 PROCEDURE — 97110 THERAPEUTIC EXERCISES: CPT

## 2019-06-12 NOTE — PROGRESS NOTES
[facility] [address] [facility] [address], [address] [phone] fax    Plan of Care/ Statement of Necessity for Physical Therapy Services           Patient name: Liseth Coats Start of Care: 2019   Referral source: Neema Macias MD : 1980    Medical Diagnosis: Pain in left hip [M25.552]  Low back pain [M54.5]  Unequal limb length (acquired), unspecified site [M21.70]  Payor: [facility] MEDICAID / Plan: [facility] COMMUNITY PLAN [facility] / Product Type: Managed Care Medicaid /  Onset Date:19    Treatment Diagnosis: left hip and low back pain    Prior Hospitalization: see medical history Provider#: [id number]   Medications: Verified on Patient summary List    Comorbidities: congenital hip dysplasia, leg length discrepancy, OA, depression, asthma, scoliosis, tobacco use   Prior Level of Function: mod (I) with SPC    The Plan of Care and following information is based on the information from the initial evaluation. Assessment/ key information: Pt is a 42yo male who presents to PT with signs and symptoms consistent with DJD of the left hip with congenital dysplasia and mechanical back pain. Pt is at PT to increase strength to prepare for THR. Pt demonstrates abnormal gait, decreased strength with muscle atrophy bilaterally, and significantly reduced hamstring flexibility. Pt deficits impair normal gait and ability to perform ADLs at desired levels. Pt will benefit from skilled PT in order to address listed impairments, decrease complaints of pain, and maximize functional potential.     Evaluation Complexity History MEDIUM  Complexity : 1-2 comorbidities / personal factors will impact the outcome/ POC ; Examination MEDIUM Complexity : 3 Standardized tests and measures addressing body structure, function, activity limitation and / or participation in recreation  ;Presentation MEDIUM Complexity : Evolving with changing characteristics  ; Clinical Decision Making MEDIUM Complexity : FOTO score of 26-74  Overall Complexity Rating: MEDIUM  Problem List: pain affecting function, decrease ROM, decrease strength, impaired gait/ balance, decrease ADL/ functional abilitiies, decrease activity tolerance, decrease flexibility/ joint mobility and decrease transfer abilities   Treatment Plan may include any combination of the following: Therapeutic exercise, Therapeutic activities, Neuromuscular re-education, Physical agent/modality, Gait/balance training, Manual therapy, Patient education, Functional mobility training and Stair training  Patient / Family readiness to learn indicated by: asking questions and trying to perform skills  Persons(s) to be included in education: patient (P)  Barriers to Learning/Limitations: None  Patient Goal (s): to prepare for THR and to keep muscles from wasting away; maybe if I'm helio some pain relief  Patient Self Reported Health Status: poor  Rehabilitation Potential: fair  Short Term Goals: To be accomplished in 2 weeks:  1. Pt will demonstrate (I) with HEP as evidenced by performance in clinic with minimal instruction  2. Pt will demonstrate sit to stand from 18in chair without UE in order to increase functional strength and improve ability to transfer    Long Term Goals: To be accomplished in 10 treatments:  1. Pt will demonstrate bilatearl hip strength minimum 4+/5 in order to improve ambulation distances in the community   2. Pt will perform 5x sit to  20seconds or less in order to demonstrate normalized functional leg strength and reduce risk of falls  3. Pt will demonstrate 10meter walk test in 5.5seconds in order to progress towards normal ambulation gait speeds for crossing the street  4. Pt will score at least 46 on FOTO in order to improve overall function, decrease pain, and facilitate return to PLOF. Frequency / Duration: Patient to be seen 2 times per week for 10 treatments.     Patient/ Caregiver education and instruction: Diagnosis, prognosis, self care, activity modification and exercises   [x]  Plan of care has been reviewed with ABIGAIL Rodriguez, PT 6/12/2019 4:11 PM    ________________________________________________________________________    I certify that the above Therapy Services are being furnished while the patient is under my care. I agree with the treatment plan and certify that this therapy is necessary.     Physician's Signature:____________Date:_________TIME:________    ** Signature, Date and Time must be completed for valid certification **    Please sign and return to [facility] [address], [address] [phone] fax

## 2019-06-12 NOTE — PROGRESS NOTES
PT DAILY TREATMENT NOTE/BACK and HIP EVAL 10-18    Patient Name: Misael Gandhi  Date:2019  : 1980  [x]  Patient  Verified  Payor: Leonel Julien / Plan: [facility] COMMUNITY PLAN [employer] / Product Type: Managed Care Medicaid /    In time:409  Out time:456  Total Treatment Time (min): 47  Visit #: 1 of 10    Medicare/BCBS Only   Total Timed Codes (min):  47 1:1 Treatment Time:  25     Treatment Area: Pain in left hip [M25.552]  Low back pain [M54.5]  Unequal limb length (acquired), unspecified site [M21.70]    SUBJECTIVE  Pain Level (0-10 scale): current = 6-7/10, worst = 10/10, best = 6-7/10  Aggravating = walking, standing, prolonged sitting, lifting objects, moving after being sedentary  Alleviating = non WB, laying flat on back, sitting down    Any medication changes, allergies to medications, adverse drug reactions, diagnosis change, or new procedure performed?: [x] No    [] Yes (see summary sheet for update)  Subjective functional status/changes:     PLOF: Pt reports to PT with congenital hip dysplasia. Pt reports that he had femoral pinning when he was an infant with subsequent removal ~3ears old in which a piece of hardware broke off and remains in the bone. Pt reports sensitivity in this area. Pt continues to have left hip pain from dysplasia and OA, he would like to have a THR. \"I have known I would need a THR for years\" Pt reports increasing pain recently that motivated him to seek medical attention.  Pt feels he can feel the bones grinding on each other which sends his pain levels to 10/10    PMHx/Surgical Hx: congenital hip dysplasia, leg length discrepancy, OA, depression, asthma, scoliosis, tobacco use  Work Hx: receiving disability due to hip since   Living Situation: 2 stairs to get into home and 4 to get out the back door  Pt Goals: \"to prepare for THR and to keep muscles from wasting away; maybe if I'm helio some pain relief\"    OBJECTIVE/EXAMINATION    25 min Therapeutic Exercise:  [x] See flow sheet : explanation, demonstration, performance and distribution of HEP   Rationale: increase ROM and increase strength to improve the patients ability to ambulate community distances    With   [] TE   [] TA   [] neuro   [] other: Patient Education: [x] Review HEP    [] Progressed/Changed HEP based on:   [] positioning   [] body mechanics   [] transfers   [] heat/ice application    [] other:      Physical Therapy Evaluation - Knee    Gait: antalgic with SPC in right hand; IR/toe in on right LE; pt wears right heel lift in shoe to correct leg length discrepancy     30sec sit to stand: 2x from 18in chair without UE, increased pain and weight shift to right LE to offweight painful left LE    10 meter walk test:   6.63seconds = 0.90m/sec = community ambulator  ROM / Strength                            Strength (1-5/5)               Right Left   Hip Flexion 4+ 4-    ER 5 4    IR 5 4    Abduction 4 4-    Extension 4 3   Knee Extension 5 4    Flexion 5 4+   Ankle Dorsiflexion 5 5-    Plantarflexion 5 5   NOTES:  Hip ER/IR reduced from normal limits,however, symmetrical. Left hip AROM increases pain    Flexibility:    Ely = negative B quad tightness   Jose = negative B   90-90 Popliteal angle = lacking 55right, lacking 40deg left for hamstring tightness    Palpation:   Lumbar passive intervertebral mobility WNL, pt reporting relief with CPA; pt reports a \"soft spot\" in low back (upper lumbar) that he claims will give him convulsions if pressed on and can be responsible for pain that prevents him from getting up    Reflexes:  L4: right = 3+, left = 2+  S1: B = 2+     SLR and slump = negative B    Pain Level (0-10 scale) post treatment: 7.5    ASSESSMENT/Changes in Function: see POC    Patient will continue to benefit from skilled PT services to modify and progress therapeutic interventions, address functional mobility deficits, address ROM deficits, address strength deficits, analyze and address soft tissue restrictions, analyze and cue movement patterns, analyze and modify body mechanics/ergonomics, assess and modify postural abnormalities and balance to attain remaining goals. [x]  See Plan of Care  []  See progress note/recertification  []  See Discharge Summary         Progress towards goals / Updated goals:  Short Term Goals: To be accomplished in 2 weeks:  1. Pt will demonstrate (I) with HEP as evidenced by performance in clinic with minimal instruction   Eval = established  2. Pt will demonstrate sit to stand from 18in chair without UE in order to increase functional strength and improve ability to transfer   Eval = unequal weight bearing     Long Term Goals: To be accomplished in 10 treatments:  1. Pt will demonstrate bilatearl hip strength minimum 4+/5 in order to improve ambulation distances in the community    Eval = 3-4/5  2. Pt will perform 5x sit to  20seconds or less in order to demonstrate normalized functional leg strength and reduce risk of falls   Eval = unequal weight bearing, 2xonly  3. Pt will demonstrate 10meter walk test in 5.5seconds in order to progress towards normal ambulation gait speeds for crossing the street   Eval = 6.63sec = 0.90m/sec = community ambulator  4. Pt will score at least 46 on FOTO in order to improve overall function, decrease pain, and facilitate return to PLOF.    Eval = 37    PLAN  [x]  Upgrade activities as tolerated     [x]  Continue plan of care  []  Update interventions per flow sheet       []  Discharge due to:_  []  Other:_      Nabeel Mckeon, PT 6/12/2019  4:21 PM

## 2019-06-19 ENCOUNTER — HOSPITAL ENCOUNTER (OUTPATIENT)
Dept: PHYSICAL THERAPY | Age: 39
Discharge: HOME | End: 2019-06-19
Payer: MEDICAID

## 2019-06-19 PROCEDURE — 97110 THERAPEUTIC EXERCISES: CPT

## 2019-06-19 PROCEDURE — 97112 NEUROMUSCULAR REEDUCATION: CPT

## 2019-06-19 NOTE — PROGRESS NOTES
PT DAILY TREATMENT NOTE 10-18    Patient Name: Carolyn De Souza  Date:2019  : 1980  [x]  Patient  Verified  Payor: [place] / Plan: [facility] [employer] [facility] COMMUNITY PLAN MARQUISE [employer] / Product Type: Managed Care Medicaid /    In time:357  Out time:433  Total Treatment Time (min): 36  Visit #: 2 of 10      Treatment Area: Pain in left hip [M25.552]  Low back pain [M54.5]  Unequal limb length (acquired), unspecified site [M21.70]    SUBJECTIVE  Pain Level (0-10 scale): 6  Any medication changes, allergies to medications, adverse drug reactions, diagnosis change, or new procedure performed?: [x] No    [] Yes (see summary sheet for update)  Subjective functional status/changes:   [] No changes reported  \"I'm in pain all the time. \"    OBJECTIVE    26 min Therapeutic Exercise:  [x] See flow sheet :   Rationale: increase ROM, increase strength, improve coordination, improve balance and increase proprioception to improve the patients ability to perform ADLs. 10 min Neuromuscular Re-education:  [x]  See flow sheet :squat form, TRX squat, weight shifting   Rationale: increase ROM, increase strength, improve coordination, improve balance and increase proprioception  to improve the patients ability to normalize gait. With   [] TE   [] TA   [] neuro   [] other: Patient Education: [x] Review HEP    [] Progressed/Changed HEP based on:   [] positioning   [] body mechanics   [] transfers   [] heat/ice application    [] other:      Other Objective/Functional Measures:      Pain Level (0-10 scale) post treatment: 8    ASSESSMENT/Changes in Function: Mr. Anuja Coats was very painful with standing activities as well as with SLR on the left LE today. modified SLR on left to a march with TA to accommodate. Was unstable with TRX and required chair behind and close supervision for safety.      Patient will continue to benefit from skilled PT services to modify and progress therapeutic interventions, address functional mobility deficits, address ROM deficits, address strength deficits, analyze and address soft tissue restrictions, analyze and cue movement patterns, analyze and modify body mechanics/ergonomics, assess and modify postural abnormalities, address imbalance/dizziness and instruct in home and community integration to attain remaining goals. []  See Plan of Care  []  See progress note/recertification  []  See Discharge Summary         Progress towards goals / Updated goals:  Short Term Goals: To be accomplished in 2 weeks: 1.   Pt will demonstrate (I) with HEP as evidenced by performance in clinic with minimal instruction              Eval = established  2.   Pt will demonstrate sit to stand from 18in chair without UE in order to increase functional strength and improve ability to transfer              Eval = unequal weight bearing     Long Term Goals: To be accomplished in 10 treatments:  1.   Pt will demonstrate bilatearl hip strength minimum 4+/5 in order to improve ambulation distances in the community               Eval = 3-4/5  2.   Pt will perform 5x sit to  20seconds or less in order to demonstrate normalized functional leg strength and reduce risk of falls              Eval = unequal weight bearing, 2xonly  3.   Pt will demonstrate 10meter walk test in 5.5seconds in order to progress towards normal ambulation gait speeds for crossing the street              Eval = 6.63sec = 0.90m/sec = community ambulator  4.   Pt will score at least 46 on FOTO in order to improve overall function, decrease pain, and facilitate return to PLOF.               Eval = 37        PLAN  []  Upgrade activities as tolerated     [x]  Continue plan of care  []  Update interventions per flow sheet       []  Discharge due to:_  []  Other:_      Curt Agosto PT 6/19/2019  4:09 PM    Future Appointments   Date Time Provider Joseph Barber   6/24/2019  4:00 PM Bluford Scales, [place] [facility]   6/27/2019  3:30 PM [facility]

## 2019-06-24 ENCOUNTER — APPOINTMENT (OUTPATIENT)
Dept: PHYSICAL THERAPY | Age: 39
End: 2019-06-24
Payer: MEDICAID

## 2019-06-27 ENCOUNTER — HOSPITAL ENCOUNTER (OUTPATIENT)
Dept: PHYSICAL THERAPY | Age: 39
Discharge: HOME | End: 2019-06-27
Payer: MEDICAID

## 2019-06-27 PROCEDURE — 97110 THERAPEUTIC EXERCISES: CPT

## 2019-06-27 PROCEDURE — 97112 NEUROMUSCULAR REEDUCATION: CPT

## 2019-06-27 NOTE — PROGRESS NOTES
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 \"    OBJECTIVE    Modality rationale: patient declined   Min Type Additional Details    [] Estim:  []Unatt       []IFC  []Premod                        []Other:  []w/ice   []w/heat  Position:  Location:    [] Estim: []Att    []TENS instruct  []NMES                    []Other:  []w/US   []w/ice   []w/heat  Position:  Location:    []  Traction: [] Cervical       []Lumbar                       [] Prone          []Supine                       []Intermittent   []Continuous Lbs:  [] before manual  [] after manual    []  Ultrasound: []Continuous   [] Pulsed                           []1MHz   []3MHz W/cm2:  Location:    []  Iontophoresis with dexamethasone         Location: [] Take home patch   [] In clinic    []  Ice     []  heat  []  Ice massage  []  Laser   []  Anodyne Position:  Location:    []  Laser with stim  []  Other:  Position:  Location:    []  Vasopneumatic Device Pressure:       [] lo [] med [] hi   Temperature: [] lo [] med [] hi   [] Skin assessment post-treatment:  []intact []redness- no adverse reaction    []redness  adverse reaction:     10 min Therapeutic Exercise:  [x] See flow sheet :   Rationale: increase ROM and increase strength to improve the patients ability to perform ADLs    26 min Neuromuscular Re-education:  [x]  See flow sheet : hip/glut re-ed activities, squat form   Rationale: increase ROM, increase strength, improve coordination, improve balance and increase proprioception  to improve the patients ability to improve mobility, stance stability, and gait        With   [x] TE   [] TA   [x] neuro   [] other: Patient Education: [x] Review HEP    [] Progressed/Changed HEP based on:   [x] positioning   [x] body mechanics   [] transfers   [] heat/ice application    [] other:      Other Objective/Functional Measures:      Pain Level (0-10 scale) post treatment: 8-9    ASSESSMENT/Changes in Function: Pt is making slow progress with pain relief. Remains painful with most exercises. Challenged with squatting mechanics. Patient will continue to benefit from skilled PT services to modify and progress therapeutic interventions, address functional mobility deficits, address ROM deficits, address strength deficits, analyze and address soft tissue restrictions, analyze and cue movement patterns, analyze and modify body mechanics/ergonomics, assess and modify postural abnormalities, address imbalance/dizziness and instruct in home and community integration to attain remaining goals. [x]  See Plan of Care  []  See progress note/recertification  []  See Discharge Summary         Progress towards goals / Updated goals:  Short Term Goals: To be accomplished in 2 weeks:   1.   Pt will demonstrate (I) with HEP as evidenced by performance in clinic with minimal instruction              Eval = established    Reports compliance  2.   Pt will demonstrate sit to stand from 18in chair without UE in order to increase functional strength and improve ability to transfer              Eval = unequal weight bearing    No change to note     Long Term Goals: To be accomplished in 10 treatments:  1.   Pt will demonstrate bilatearl hip strength minimum 4+/5 in order to improve ambulation distances in the community               Eval = 3-4/5    No change to note  2.   Pt will perform 5x sit to  20seconds or less in order to demonstrate normalized functional leg strength and reduce risk of falls              Eval = unequal weight bearing, 2x only    Challenged with weight shifting to the left  3.   Pt will demonstrate 10meter walk test in 5.5seconds in order to progress towards normal ambulation gait speeds for crossing the street              Eval = 6.63sec = 0.90m/sec = community ambulator    Assess at 30 day bonita  4.   Pt will score at least 46 on FOTO in order to improve overall function, decrease pain, and facilitate return to PLOF.             Eval = 37    Assess at 30 day bonita    PLAN  []  Upgrade activities as tolerated     [x]  Continue plan of care  []  Update interventions per flow sheet       []  Discharge due to:_  []  Other:_      Shahab Fields PTA, CSCS 6/27/2019  4:20 PM    No future appointments.

## 2019-06-28 ENCOUNTER — HOSPITAL ENCOUNTER (OUTPATIENT)
Dept: PHYSICAL THERAPY | Age: 39
Discharge: HOME | End: 2019-06-28
Payer: MEDICAID

## 2019-06-28 PROCEDURE — 97112 NEUROMUSCULAR REEDUCATION: CPT

## 2019-06-28 PROCEDURE — 97110 THERAPEUTIC EXERCISES: CPT

## 2019-06-28 NOTE — PROGRESS NOTES
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 My hip does hurt though. It really feels like it is grinding today. \"    OBJECTIVE    25 min Therapeutic Exercise:  [x] See flow sheet :   Rationale: increase ROM, increase strength, improve coordination and improve balance to improve the patients ability to progress towards a normalized, safe gait. 14 min Neuromuscular Re-education:  [x]  See flow sheet :   Rationale: increase ROM, increase strength, improve coordination and improve balance  to improve the patients ability to tolerate prolonged periods of standing. With   [x] TE   [] TA   [] neuro   [] other: Patient Education: [x] Review HEP    [] Progressed/Changed HEP based on:   [] positioning   [] body mechanics   [] transfers   [] heat/ice application    [] other:      Other Objective/Functional Measures:     Pain Level (0-10 scale) post treatment: 9    ASSESSMENT/Changes in Function:   Mr. [employer] continues to slowly progress with exercise; however, is still reporting high pain levels. He demonstrated difficulty with standing hip 4 way exercises and TRX squats, requiring multiple rest breaks; required PT stand by assist and chair behind.  Pt reported increased pain levels after completing table exercises, but denies rest breaks and modifications when offered. Patient will continue to benefit from skilled PT services to modify and progress therapeutic interventions, address functional mobility deficits, address ROM deficits, address strength deficits, analyze and address soft tissue restrictions, analyze and cue movement patterns and assess and modify postural abnormalities to attain remaining goals. [x]  See Plan of Care  []  See progress note/recertification  []  See Discharge Summary         Progress towards goals / Updated goals:  Short Term Goals: To be accomplished in 2 weeks: 1.   Pt will demonstrate (I) with HEP as evidenced by performance in clinic with minimal instruction              Eval = established              Continue to monitor for compliance  2.   Pt will demonstrate sit to stand from 18in chair without UE in order to increase functional strength and improve ability to transfer              Eval = unequal weight bearing       Assess progress at next visit  [address] [place] [address], [place] be accomplished in 10 treatments:  1.   Pt will demonstrate bilatearl hip strength minimum 4+/5 in order to improve ambulation distances in the community               Eval = 3-4/5              Continue to monitor  2.   Pt will perform 5x sit to  20seconds or less in order to demonstrate normalized functional leg strength and reduce risk of falls              Eval = unequal weight bearing, 2x only              Current = Pt struggled to rise from seated rest breaks, requires UE  3.   Pt will demonstrate 10meter walk test in 5.5seconds in order to progress towards normal ambulation gait speeds for crossing the street              Eval = 6.63sec = 0.90m/sec = community ambulator              Assess at 30 day bonita  4.   Pt will score at least 46 on FOTO in order to improve overall function, decrease pain, and facilitate return to PLOF.               Eval = 37 Assess at 30 day bonita        PLAN  [x]  Upgrade activities as tolerated     [x]  Continue plan of care  []  Update interventions per flow sheet       []  Discharge due to:_  []  Other:_      Ry Wayne 6/28/2019  3:16 PM    Future Appointments   Date Time Provider Joseph Barber   6/28/2019  3:30 PM Natalee Kennedy, PT [facility]   7/2/2019  3:30 PM Jami Roberson, PT [facility]   7/3/2019  3:30 PM Adilene Stewart [facility]   7/8/2019  3:30 PM Diana Morley PTA [facility]   7/10/2019  3:30 PM Diana Morley PTA [facility]

## 2019-07-02 ENCOUNTER — HOSPITAL ENCOUNTER (OUTPATIENT)
Dept: PHYSICAL THERAPY | Age: 39
Discharge: HOME | End: 2019-07-02
Payer: MEDICAID

## 2019-07-02 PROCEDURE — 97110 THERAPEUTIC EXERCISES: CPT

## 2019-07-02 PROCEDURE — 97112 NEUROMUSCULAR REEDUCATION: CPT

## 2019-07-02 NOTE — PROGRESS NOTES
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 OBJECTIVE      26 Min Therapeutic Exercise:  [x] See flow sheet :   Rationale: increase ROM, increase strength, improve coordination, improve balance and increase proprioception to improve the patients ability to safely ambulate in the community. 10 min Neuromuscular Re-education:  [x]  See flow sheet : core stabilization; TA activities   Rationale: increase ROM, increase strength, improve coordination, improve balance and increase proprioception  to improve the patients ability to perform ADLs. With   [x] TE   [] TA   [x] neuro   [] other: Patient Education: [x] Review HEP    [] Progressed/Changed HEP based on:   [] positioning   [] body mechanics   [] transfers   [] heat/ice application    [] other:      Other Objective/Functional Measures:      Pain Level (0-10 scale) post treatment: 9    ASSESSMENT/Changes in Function:   Mr. [employer] was very painful with standing activities; held TRX squats and ball on wall. Bed mobility exercises modified due to increased pain levels; see flow sheet. Plan to regress exercises at next visit due to consistent increased pain.      Patient will continue to benefit from skilled PT services to modify and progress therapeutic interventions, address functional mobility deficits, address ROM deficits, address strength deficits, analyze and address soft tissue restrictions, analyze and cue movement patterns, analyze and modify body mechanics/ergonomics and assess and modify postural abnormalities to attain remaining goals. [x]  See Plan of Care  []  See progress note/recertification  []  See Discharge Summary         Progress towards goals / Updated goals:  Short Term Goals: To be accomplished in 2 weeks: 1.   Pt will demonstrate (I) with HEP as evidenced by performance in clinic with minimal instruction              Eval = established              Continue to monitor for compliance  2.   Pt will demonstrate sit to stand from 18in chair without UE in order to increase functional strength and improve ability to transfer              Eval = unequal weight bearing              Deferred secondary to increased pain  Long Term Goals: To be accomplished in 10 treatments:  1.   Pt will demonstrate bilatearl hip strength minimum 4+/5 in order to improve ambulation distances in the community               Eval = 3-4/5              Continue to monitor  2.   Pt will perform 5x sit to  20seconds or less in order to demonstrate normalized functional leg strength and reduce risk of falls              Eval = unequal weight bearing, 2x only              Current = Pt struggled to rise from seated rest breaks, requires UE  3.   Pt will demonstrate 10meter walk test in 5.5seconds in order to progress towards normal ambulation gait speeds for crossing the street              Eval = 6.63sec = 0.90m/sec = community ambulator              Assess at 30 day bonita  4.   Pt will score at least 46 on FOTO in order to improve overall function, decrease pain, and facilitate return to PLOF.               Eval = 37              Assess at 30 day bonita    PLAN  []  Upgrade activities as tolerated     [x]  Continue plan of care  []  Update interventions per flow sheet       []  Discharge due to:_  []  Other:_      Tuan ArdonStringer 7/2/2019  4:15 PM    Future Appointments   Date Time Provider Joseph Barber   7/3/2019  3:30 PM Carroll Bermeo, PT [facility]   7/8/2019  3:30 PM Gurdeep Suárez PTA [facility]   7/10/2019  3:30 PM Gurdeep Suárez PTA [facility]

## 2019-07-03 ENCOUNTER — HOSPITAL ENCOUNTER (OUTPATIENT)
Dept: PHYSICAL THERAPY | Age: 39
Discharge: HOME | End: 2019-07-03
Payer: MEDICAID

## 2019-07-03 PROCEDURE — 97112 NEUROMUSCULAR REEDUCATION: CPT

## 2019-07-03 PROCEDURE — 97110 THERAPEUTIC EXERCISES: CPT

## 2019-07-03 NOTE — PROGRESS NOTES
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 \"    OBJECTIVE    Modality rationale: decrease edema, decrease inflammation and decrease pain to improve the patients ease with gait.     Min Type Additional Details    [] Estim:  []Unatt       []IFC  []Premod                        []Other:  []w/ice   []w/heat  Position:  Location:    [] Estim: []Att    []TENS instruct  []NMES                    []Other:  []w/US   []w/ice   []w/heat  Position:  Location:    []  Traction: [] Cervical       []Lumbar                       [] Prone          []Supine                       []Intermittent   []Continuous Lbs:  [] before manual  [] after manual    []  Ultrasound: []Continuous   [] Pulsed                           []1MHz   []3MHz W/cm2:  Location:    []  Iontophoresis with dexamethasone         Location: [] Take home patch   [] In clinic   5 [x]  Ice     []  heat  []  Ice massage  []  Laser   []  Anodyne Position:prone  Location:low back    []  Laser with stim  []  Other:  Position:  Location:    []  Vasopneumatic Device Pressure:       [] lo [] med [] hi   Temperature: [] lo [] med [] hi   [x] Skin assessment post-treatment:  [x]intact []redness- no adverse reaction    []redness  adverse reaction:       30 Min Therapeutic Exercise:  [x] See flow sheet :   Rationale: increase ROM, increase strength, improve coordination, improve balance and increase proprioception to improve the patients ability to safely ambulate in the community.    13 min Neuromuscular Re-education:  [x]  See flow sheet : core stabilization; TA activities   Rationale: increase ROM, increase strength, improve coordination, improve balance and increase proprioception  to improve the patients ability to perform ADLs. With   [] TE   [] TA   [] neuro   [] other: Patient Education: [x] Review HEP    [] Progressed/Changed HEP based on:   [] positioning   [] body mechanics   [] transfers   [] heat/ice application    [] other:      Other Objective/Functional Measures:      Pain Level (0-10 scale) post treatment: 7.5    ASSESSMENT/Changes in Function: Mr ruiz had better tolerance to exercises today but did have some pain with prone hip extension. Ice did help bring pain down after that exercise. Patient will continue to benefit from skilled PT services to modify and progress therapeutic interventions, address functional mobility deficits, address ROM deficits, address strength deficits, analyze and address soft tissue restrictions, analyze and cue movement patterns, analyze and modify body mechanics/ergonomics, assess and modify postural abnormalities, address imbalance/dizziness and instruct in home and community integration to attain remaining goals. []  See Plan of Care  []  See progress note/recertification  []  See Discharge Summary         Progress towards goals / Updated goals:  Short Term Goals: To be accomplished in 2 weeks:   1.   Pt will demonstrate (I) with HEP as evidenced by performance in clinic with minimal instruction              Eval = established              Continue to monitor for compliance  2.   Pt will demonstrate sit to stand from 18in chair without UE in order to increase functional strength and improve ability to transfer              Eval = unequal weight bearing              Deferred secondary to increased pain  Long Term Goals: To be accomplished in 10 treatments:  1.   Pt will demonstrate bilatearl hip strength minimum 4+/5 in order to improve ambulation distances in the community               Eval = 3-4/5              Continue to monitor  2.   Pt will perform 5x sit to  20seconds or less in order to demonstrate normalized functional leg strength and reduce risk of falls              Eval = unequal weight bearing, 2x only              Current = Pt struggled to rise from seated rest breaks, requires UE  3.   Pt will demonstrate 10meter walk test in 5.5seconds in order to progress towards normal ambulation gait speeds for crossing the street              Eval = 6.63sec = 0.90m/sec = community ambulator              Assess at 30 day bonita  4.   Pt will score at least 46 on FOTO in order to improve overall function, decrease pain, and facilitate return to PLOF.               Eval = 37              Assess at 30 day bonita        PLAN  []  Upgrade activities as tolerated     [x]  Continue plan of care  []  Update interventions per flow sheet       []  Discharge due to:_  []  Other:_      Sherwin Carmichael, PT 7/3/2019  4:14 PM    Future Appointments   Date Time Provider Joseph Barber   7/8/2019  3:30 PM Marimar Butler PTA [facility]   7/10/2019  3:30 PM Marimar Butler PTA [facility]

## 2019-07-08 ENCOUNTER — HOSPITAL ENCOUNTER (OUTPATIENT)
Dept: PHYSICAL THERAPY | Age: 39
Discharge: HOME | End: 2019-07-08
Payer: MEDICAID

## 2019-07-08 PROCEDURE — 97112 NEUROMUSCULAR REEDUCATION: CPT

## 2019-07-08 PROCEDURE — 97110 THERAPEUTIC EXERCISES: CPT

## 2019-07-08 NOTE — PROGRESS NOTES
PT DAILY TREATMENT NOTE 10-18    Patient Name: Amy Fuller  Date:2019  : 1980  [x]  Patient  Verified  Payor: Carlos Alberto Fields / Plan: 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 \"    OBJECTIVE    Modality rationale: patient declined   Min Type Additional Details    [] Estim:  []Unatt       []IFC  []Premod                        []Other:  []w/ice   []w/heat  Position:  Location:    [] Estim: []Att    []TENS instruct  []NMES                    []Other:  []w/US   []w/ice   []w/heat  Position:  Location:    []  Traction: [] Cervical       []Lumbar                       [] Prone          []Supine                       []Intermittent   []Continuous Lbs:  [] before manual  [] after manual    []  Ultrasound: []Continuous   [] Pulsed                           []1MHz   []3MHz W/cm2:  Location:    []  Iontophoresis with dexamethasone         Location: [] Take home patch   [] In clinic    []  Ice     []  heat  []  Ice massage  []  Laser   []  Anodyne Position:  Location:    []  Laser with stim  []  Other:  Position:  Location:    []  Vasopneumatic Device Pressure:       [] lo [] med [] hi   Temperature: [] lo [] med [] hi   [] Skin assessment post-treatment:  []intact []redness- no adverse reaction    []redness  adverse reaction:     10 min Therapeutic Exercise:  [x] See flow sheet :   Rationale: increase ROM and increase strength to improve the patients ability to perform ADLs    29 min Neuromuscular Re-education:  [x]  See flow sheet : core stabilization activities   Rationale: increase ROM, increase strength, improve coordination, improve balance and increase proprioception  to improve the patients ability to improve mobility and upright posture        With   [x] TE   [] TA   [x] neuro   [] other: Patient Education: [x] Review HEP    [] Progressed/Changed HEP based on:   [x] positioning   [x] body mechanics   [] transfers   [] heat/ice application    [] other:      Other Objective/Functional Measures:      Pain Level (0-10 scale) post treatment: 8    ASSESSMENT/Changes in Function: Pt reports elevated pain today secondary to weather, but was able to increase activity tolerance and increase volume of exercises and repetitions. Needs cuing to improve TA draw. Patient will continue to benefit from skilled PT services to modify and progress therapeutic interventions, address functional mobility deficits, address ROM deficits, address strength deficits, analyze and address soft tissue restrictions, analyze and cue movement patterns, analyze and modify body mechanics/ergonomics, assess and modify postural abnormalities, address imbalance/dizziness and instruct in home and community integration to attain remaining goals. [x]  See Plan of Care  []  See progress note/recertification  []  See Discharge Summary         Progress towards goals / Updated goals:  Short Term Goals: To be accomplished in 2 weeks:   1.   Pt will demonstrate (I) with HEP as evidenced by performance in clinic with minimal instruction              Eval = established              Continue to monitor for compliance  2.   Pt will demonstrate sit to stand from 18in chair without UE in order to increase functional strength and improve ability to transfer              Eval = unequal weight bearing              Deferred secondary to increased pain  Long Term Goals: To be accomplished in 10 treatments:  1.   Pt will demonstrate bilatearl hip strength minimum 4+/5 in order to improve ambulation distances in the community               Eval = 3-4/5              Continue to monitor  2.   Pt will perform 5x sit to  20seconds or less in order to demonstrate normalized functional leg strength and reduce risk of falls              Eval = unequal weight bearing, 2x only              Current = Pt struggled to rise from seated rest breaks, requires UE  3.   Pt will demonstrate 10meter walk test in 5.5seconds in order to progress towards normal ambulation gait speeds for crossing the street              Eval = 6.63sec = 0.90m/sec = community ambulator              Assess at 30 day bonita  4.   Pt will score at least 46 on FOTO in order to improve overall function, decrease pain, and facilitate return to PLOF.               Eval = 37              Assess at 30 day bonita    PLAN  []  Upgrade activities as tolerated     [x]  Continue plan of care  []  Update interventions per flow sheet       []  Discharge due to:_  []  Other:_      Joselyn Moran PTA, CSCS 7/8/2019  4:16 PM    Future Appointments   Date Time Provider Joseph Barber   7/10/2019  3:30 PM Matthew French PTA [facility]

## 2019-07-10 ENCOUNTER — HOSPITAL ENCOUNTER (OUTPATIENT)
Dept: PHYSICAL THERAPY | Age: 39
Discharge: HOME | End: 2019-07-10
Payer: MEDICAID

## 2019-07-10 PROCEDURE — 97110 THERAPEUTIC EXERCISES: CPT

## 2019-07-10 PROCEDURE — 97112 NEUROMUSCULAR REEDUCATION: CPT

## 2019-07-10 NOTE — PROGRESS NOTES
PT DAILY TREATMENT NOTE 10-18    Patient Name: Raiza Christie  Date:7/10/2019  : 1980  [x]  Patient  Verified  Payor: Tevin French / Plan: [facility] COMMUNITY PLAN [employer] / Product Type: Managed Care Medicaid /    In time:330  Out time:416  Total Treatment Time (min): 55  Visit #: 8 of 10    Treatment Area: Pain in left hip [M25.552]  Low back pain [M54.5]  Unequal limb length (acquired), unspecified site [M21.70]    SUBJECTIVE  Pain Level (0-10 scale): 8.5  Any medication changes, allergies to medications, adverse drug reactions, diagnosis change, or new procedure performed?: [x] No    [] Yes (see summary sheet for update)  Subjective functional status/changes:   [] No changes reported  \"I've been having a rough couple of days. \"    OBJECTIVE    Modality rationale: patient declined   Min Type Additional Details    [] Estim:  []Unatt       []IFC  []Premod                        []Other:  []w/ice   []w/heat  Position:  Location:    [] Estim: []Att    []TENS instruct  []NMES                    []Other:  []w/US   []w/ice   []w/heat  Position:  Location:    []  Traction: [] Cervical       []Lumbar                       [] Prone          []Supine                       []Intermittent   []Continuous Lbs:  [] before manual  [] after manual    []  Ultrasound: []Continuous   [] Pulsed                           []1MHz   []3MHz W/cm2:  Location:    []  Iontophoresis with dexamethasone         Location: [] Take home patch   [] In clinic    []  Ice     []  heat  []  Ice massage  []  Laser   []  Anodyne Position:  Location:    []  Laser with stim  []  Other:  Position:  Location:    []  Vasopneumatic Device Pressure:       [] lo [] med [] hi   Temperature: [] lo [] med [] hi   [] Skin assessment post-treatment:  []intact []redness- no adverse reaction    []redness  adverse reaction:     16 min Therapeutic Exercise:  [x] See flow sheet :   Rationale: increase ROM and increase strength to improve the patients ability to perform ADLs    30 min Neuromuscular Re-education:  [x]  See flow sheet : core stabilization activities   Rationale: increase ROM, increase strength, improve coordination, improve balance and increase proprioception  to improve the patients ability to improve mobility, stance stability, gait, and upright posture        With   [x] TE   [] TA   [x] neuro   [] other: Patient Education: [x] Review HEP    [] Progressed/Changed HEP based on:   [x] positioning   [x] body mechanics   [] transfers   [] heat/ice application    [] other:      Other Objective/Functional Measures:   FOTO 44    Hip flexion right 4+/5, left 4/5  Hip abduction right 4/5, left 4-/5  Hip extension right 4/5, left 4-/5     Pain Level (0-10 scale) post treatment: 9.5    ASSESSMENT/Changes in Function: Mr. Alex Escudero has been a pleasure to treat and reports 25-40% improvement since beginning therapy. He reports ease with walking/standing tolerance, but still has functional deficits. His pain remains significantly high. His strength has improved, but still would benefit from continued PT for strengthening. He is challenged with sit to stands and requires UE assist. We will continue with therapy to address his remaining functional deficits. Patient will continue to benefit from skilled PT services to modify and progress therapeutic interventions, address functional mobility deficits, address ROM deficits, address strength deficits, analyze and address soft tissue restrictions, analyze and cue movement patterns, analyze and modify body mechanics/ergonomics, assess and modify postural abnormalities, address imbalance/dizziness and instruct in home and community integration to attain remaining goals. [x]  See Plan of Care  [x]  See progress note/recertification  []  See Discharge Summary         Progress towards goals / Updated goals:  Short Term Goals: To be accomplished in 2 weeks:   1.   Pt will demonstrate (I) with HEP as evidenced by performance in clinic with minimal instruction              Eval = established              NOT MET; reports non-compliance  2.   Pt will demonstrate sit to stand from 18in chair without UE in order to increase functional strength and improve ability to transfer              Eval = unequal weight bearing              PROGRESSING; requires UE assist, but improved weight bearing. Long Term Goals: To be accomplished in 10 treatments:  1.   Pt will demonstrate bilatearl hip strength minimum 4+/5 in order to improve ambulation distances in the community               Eval = 3-4/5              PROGRESSING; Hip flexion right 4+/5, left 4/5        Hip abduction right 4/5, left 4-/5       Hip extension right 4/5, left 4-/5  2.   Pt will perform 5x sit to  20 seconds or less in order to demonstrate normalized functional leg strength and reduce risk of falls              Eval = unequal weight bearing, 2x only              NOT MET; 2x in 20 seconds  3.   Pt will demonstrate 10meter walk test in 5.5seconds in order to progress towards normal ambulation gait speeds for crossing the street              Eval = 6.63sec = 0.90m/sec = community ambulator              PROGRESSING; 6.22 = 0.96 m/sec= community ambulator  4.   Pt will score at least 46 on FOTO in order to improve overall function, decrease pain, and facilitate return to PLOF.             Eval = 37              PROGRESSING; 44    Functional Gains: walking/standing tolerance, ease with sit to stands, ease with getting in/out of a car  Functional Deficits: squatting, hip and back pain, getting out of bed, mobility, sitting tolerance, lying supine  % improvement: 25-40%  Pain   Average: 6-7/10       Best: 4-5/10     Worst: 10/10  Patient Goal: \"be able to walk without a cane. \"    PLAN  []  Upgrade activities as tolerated     [x]  Continue plan of care  []  Update interventions per flow sheet       []  Discharge due to:_  []  Other:_      Allie , PTA, UZMA 7/10/2019  4:22 PM    Future Appointments   Date Time Provider Joseph Barber   7/10/2019  3:30 PM Deborah Choi [facility]

## 2019-07-11 NOTE — PROGRESS NOTES
[facility] [address]  [address] [facility] [address], [address] [phone] fax    Progress Note  Patient name: Koko Martinez Start of Care: 2019   Referral source: Haily Chu MD : 1980                Medical Diagnosis: Pain in left hip [M25.552]  Low back pain [M54.5]  Unequal limb length (acquired), unspecified site [M21.70]  Payor: [facility] MEDICAID / Plan: [facility] [employer] COMMUNITY PLAN [employer]P / Product Type: Managed Care Medicaid /  Onset Date:19                Treatment Diagnosis: left hip and low back pain    Prior Hospitalization: see medical history Provider#: [id number]   Medications: Verified on Patient summary List    Comorbidities: congenital hip dysplasia, leg length discrepancy, OA, depression, asthma, scoliosis, tobacco use   Prior Level of Function: mod (I) with SPC    Visits from Start of Care: 8    Missed Visits: 1    Established Goals:    Short Term Goals: To be accomplished in 2 weeks: 1.   Pt will demonstrate (I) with HEP as evidenced by performance in clinic with minimal instruction              Eval = established              NOT MET; reports non-compliance  2.   Pt will demonstrate sit to stand from 18in chair without UE in order to increase functional strength and improve ability to transfer              Eval = unequal weight bearing              PROGRESSING; requires UE assist, but improved weight bearing. Long Term Goals: To be accomplished in 10 treatments:  1.   Pt will demonstrate bilatearl hip strength minimum 4+/5 in order to improve ambulation distances in the community               Eval = 3-4/5              PROGRESSING;         Hip flexion right 4+/5, left 4/5                                                  Hip abduction right 4/5, left 4-/5                                                  Hip extension right 4/5, left 4-/5  2.   Pt will perform 5x sit to  20 seconds or less in order to demonstrate normalized functional leg strength and reduce risk of falls              Eval = unequal weight bearing, 2x only              NOT MET; 2x in 20 seconds  3.   Pt will demonstrate 10meter walk test in 5.5seconds in order to progress towards normal ambulation gait speeds for crossing the street              Eval = 6.63sec = 0.90m/sec = community ambulator              PROGRESSING; 6.22 = 0.96 m/sec= community ambulator  4.   Pt will score at least 46 on FOTO in order to improve overall function, decrease pain, and facilitate return to PLOF.             Eval = 37              PROGRESSING; 44     Key Functional Changes:   Functional Gains: walking/standing tolerance, ease with sit to stands, ease with getting in/out of a car  Functional Deficits: squatting, hip and back pain, getting out of bed, mobility, sitting tolerance, lying supine  % improvement: 25-40%  Pain   Average: 6-7/10                  Best: 4-5/10                Worst: 10/10  Patient Goal: \"be able to walk without a cane. \"    Updated Goals: to be achieved in 4 weeks: 1.   Pt will demonstrate sit to stand from 18in chair without UE in order to increase functional strength and improve ability to transfer              Eval = unequal weight bearing              PROGRESSING; requires UE assist, but improved weight bearing. 2.   Pt will demonstrate bilatearl hip strength minimum 4+/5 in order to improve ambulation distances in the community               Eval = 3-4/5              PROGRESSING; Hip flexion right 4+/5, left 4/5                                                  Hip abduction right 4/5, left 4-/5                                                  Hip extension right 4/5, left 4-/5  3.   Pt will score at least 46 on FOTO in order to improve overall function, decrease pain, and facilitate return to PLOF.             Eval = 37              PROGRESSING; 44    ASSESSMENT/RECOMMENDATIONS:Mr. Zuniga has been a pleasure to treat and reports 25-40% improvement since beginning therapy. He reports ease with walking/standing tolerance, but still has antalgic gait with use of SPC and decreased activity/standing tolerance. His pain remains significantly high. His strength has improved, but remains reduced requiring UE assist on sit to stands. We will continue with therapy to address his remaining functional deficits. [x]Continue therapy per initial plan/protocol at a frequency of  2 x per week for 4 weeks  []Continue therapy with the following recommended changes:_____________________      _____________________________________________________________________  []Discontinue therapy progressing towards or have reached established goals  []Discontinue therapy due to lack of appreciable progress towards goals  []Discontinue therapy due to lack of attendance or compliance  []Await Physician's recommendations/decisions regarding therapy  []Other:________________________________________________________________    Thank you for this referral.    Asia Bond, PT 7/11/2019 12:56 PM  NOTE TO PHYSICIAN:  [address]   FAX TO [facility]: ([phone]  If you are unable to process this request in 24 hours please contact our office: [phone]    []  I have read the above report and request that my patient continue as recommended. []  I have read the above report and request that my patient continue therapy with the following changes/special instructions:________________________________________  [] I have read the above report and request that my patient be discharged from therapy.     [de-identified] Signature:____________Date:_________TIME:________    [facility], Date and Time must be completed for valid certification **

## 2019-07-18 ENCOUNTER — HOSPITAL ENCOUNTER (OUTPATIENT)
Dept: PHYSICAL THERAPY | Age: 39
Discharge: HOME | End: 2019-07-18
Payer: MEDICAID

## 2019-07-18 PROCEDURE — 97112 NEUROMUSCULAR REEDUCATION: CPT

## 2019-07-18 PROCEDURE — 97110 THERAPEUTIC EXERCISES: CPT

## 2019-07-18 NOTE — PROGRESS NOTES
PT DAILY TREATMENT NOTE 10-18    Patient Name: Zev Roth  Date:2019  : 1980  [x]  Patient  Verified  Payor: Erendira Morton / Plan: [facility] COMMUNITY PLAN [employer]P / Product Type: Managed Care Medicaid /    In time:500  Out time:535  Total Treatment Time (min): 35  Visit #: 1 of 8    Treatment Area: Pain in left hip [M25.552]  Low back pain [M54.5]  Unequal limb length (acquired), unspecified site [M21.70]    SUBJECTIVE  Pain Level (0-10 scale): 7.5  Any medication changes, allergies to medications, adverse drug reactions, diagnosis change, or new procedure performed?: [x] No    [] Yes (see summary sheet for update)  Subjective functional status/changes:   [] No changes reported  \"I'm really hurting where the hardware is. My hip has been throbbing all day\". OBJECTIVE      27 min Therapeutic Exercise:  [x] See flow sheet :    Rationale: increase ROM, increase strength, improve coordination, improve balance and increase proprioception to improve the patients ability to perform ADLs. 8 min Neuromuscular Re-education:  [x]  See flow sheet : core stabilization exercises   Rationale: increase ROM, increase strength, improve coordination, improve balance and increase proprioception  to improve the patient's stability during standing and ambulation. With   [] TE   [] TA   [] neuro   [] other: Patient Education: [x] Review HEP    [] Progressed/Changed HEP based on:   [] positioning   [] body mechanics   [] transfers   [] heat/ice application    [] other:      Other Objective/Functional Measures:      Pain Level (0-10 scale) post treatment: 8.5-9.5    ASSESSMENT/Changes in Function:   Mr. Alex Escudero continues to report increased pain levels. He was unable to complete full sets of most bed exercises due to increased fatigue and pain; held standing exercises secondary to increased fatigue and pain to ensure pt safety.      Patient will continue to benefit from skilled PT services to modify and progress therapeutic interventions, address functional mobility deficits, address ROM deficits, address strength deficits, analyze and address soft tissue restrictions, analyze and cue movement patterns and imbalance to attain remaining goals. [x]  See Plan of Care  []  See progress note/recertification  []  See Discharge Summary         Progress towards goals / Updated goals:  Short Term Goals: To be accomplished in 2 weeks: 1.   Pt will demonstrate (I) with HEP as evidenced by performance in clinic with minimal instruction              Eval = established              NOT MET: reports non-compliance. \"I've been going on walks instead\".   2.   Pt will demonstrate sit to stand from 18in chair without UE in order to increase functional strength and improve ability to transfer              Eval = unequal weight bearing             CURRENT: Deferred secondary to pain   Long Term Goals: To be accomplished in 10 treatments:  1.   Pt will demonstrate bilatearl hip strength minimum 4+/5 in order to improve ambulation distances in the community               Eval = 3-4/5              PROGRESSING:        Hip flexion right 4+/5, left 4/5                                                  Hip abduction right 4/5, left 4-/5                                                  Hip extension right 4/5, left 4-/5  2.   Pt will perform 5x sit to  20 seconds or less in order to demonstrate normalized functional leg strength and reduce risk of falls              Eval = unequal weight bearing, 2x only              NOT MET; 2x in 20 seconds  3.   Pt will demonstrate 10meter walk test in 5.5seconds in order to progress towards normal ambulation gait speeds for crossing the street              Eval = 6.63sec = 0.90m/sec = community ambulator              PROGRESSING; 6.22 = 0.96 m/sec= community ambulator  4.   Pt will score at least 46 on FOTO in order to improve overall function, decrease pain, and facilitate return to PLOF.              Eval = 37              PROGRESSING; 44     PLAN  [x]  Upgrade activities as tolerated     [x]  Continue plan of care  []  Update interventions per flow sheet       []  Discharge due to:_  []  Other:_      Osmar Peralta 7/18/2019  5:08 PM    Future Appointments   Date Time Provider Joseph Barber   7/19/2019  5:00 PM Don Mitchell [facility]   7/23/2019  3:30 PM Bere Calderon PTA [facility]   7/25/2019  3:00 PM Bere Calderon PTA [facility]   7/29/2019  4:00 PM Bere Calderon PTA [facility]   7/31/2019  4:00 PM Bere Calderon PTA [facility]

## 2019-07-19 ENCOUNTER — HOSPITAL ENCOUNTER (OUTPATIENT)
Dept: PHYSICAL THERAPY | Age: 39
Discharge: HOME | End: 2019-07-19
Payer: MEDICAID

## 2019-07-19 PROCEDURE — 97112 NEUROMUSCULAR REEDUCATION: CPT

## 2019-07-19 PROCEDURE — 97110 THERAPEUTIC EXERCISES: CPT

## 2019-07-19 NOTE — PROGRESS NOTES
PT DAILY TREATMENT NOTE 10-18    Patient Name: Gregg Martinez  Date:2019  : 1980  [x]  Patient  Verified  Payor: Caio Leach / Plan: [facility] COMMUNITY PLAN [employer] / Product Type: Managed Care Medicaid /    In time:456  Out time:535  Total Treatment Time (min): 39  Visit #: 2 of 8    Treatment Area: Pain in left hip [M25.552]  Low back pain [M54.5]  Unequal limb length (acquired), unspecified site [M21.70]    SUBJECTIVE  Pain Level (0-10 scale): 8.5  Any medication changes, allergies to medications, adverse drug reactions, diagnosis change, or new procedure performed?: [x] No    [] Yes (see summary sheet for update)  Subjective functional status/changes:   [] No changes reported  Pt reports he's worse than yesterday, \"but that's how it goes - it will buildup and up to the point where the weight of pants or a sheet will even hurt it but then after a few days it goes back down. I feel like PT is helping me and that the strength I am doing here is helping me even if I am not doing it at home. \"     OBJECTIVE    24 min Therapeutic Exercise:  [x] See flow sheet :   Rationale: increase ROM and increase strength to improve the patients ability to perform ADLs    10 min Neuromuscular Re-education:  [x]  See flow sheet :   Rationale: increase strength and increase proprioception  to improve the patients ability to normalize gait           With   [x] TE   [] TA   [] neuro   [] other: Patient Education: [x] Review HEP    [] Progressed/Changed HEP based on:   [] positioning   [] body mechanics   [] transfers   [] heat/ice application    [] other:      Other Objective/Functional Measures:      Pain Level (0-10 scale) post treatment: 9    ASSESSMENT/Changes in Function: Continued to defer standing exercises due to pt complaints of high pain and exacerbated state. Pt tolerated mat table exercises with minimal increase in pain, however, was unable to complete all repetitions on the left leg.  Pt was educated to do assigned exercises at home as isolated strengthening will help improve the walking he does in place of the exercises. Patient will continue to benefit from skilled PT services to modify and progress therapeutic interventions, address functional mobility deficits, address ROM deficits, address strength deficits, analyze and address soft tissue restrictions, analyze and cue movement patterns, analyze and modify body mechanics/ergonomics and assess and modify postural abnormalities to attain remaining goals. []  See Plan of Care  []  See progress note/recertification  []  See Discharge Summary         Progress towards goals / Updated goals:  Short Term Goals: To be accomplished in 2 weeks: 1.   Pt will demonstrate (I) with HEP as evidenced by performance in clinic with minimal instruction              Eval = established              NOT MET: reports non-compliance. \"I've been going on walks instead\".   2.   Pt will demonstrate sit to stand from 18in chair without UE in order to increase functional strength and improve ability to transfer              Eval = unequal weight bearing             CURRENT: Deferred secondary to pain   Long Term Goals: To be accomplished in 10 treatments:  1.   Pt will demonstrate bilatearl hip strength minimum 4+/5 in order to improve ambulation distances in the community               Eval = 3-4/5              PROGRESSING:        Hip flexion right 4+/5, left 4/5                                                  Hip abduction right 4/5, left 4-/5                                                  Hip extension right 4/5, left 4-/5   Deferred secondary to high pain levels   2.   Pt will perform 5x sit to  20 seconds or less in order to demonstrate normalized functional leg strength and reduce risk of falls              Eval = unequal weight bearing, 2x only              NOT MET; 2x in 20 seconds   Deferred secondary to high pain levels  3.   Pt will demonstrate 10meter walk test in 5.5seconds in order to progress towards normal ambulation gait speeds for crossing the street              Eval = 6.63sec = 0.90m/sec = community ambulator              PROGRESSING; 6.22 = 0.96 m/sec= community ambulator   Deferred secondary to high pain levels  4.   Pt will score at least 46 on FOTO in order to improve overall function, decrease pain, and facilitate return to OF.               Eval = 37              PROGRESSING; 44   To be reassessed at PN  PLAN  []  Upgrade activities as tolerated     [x]  Continue plan of care  []  Update interventions per flow sheet       []  Discharge due to:_  []  Other:_      Tereza Martinez, PT 7/19/2019  5:30 PM    Future Appointments   Date Time Provider Joseph Barber   7/23/2019  3:30 PM [facility]   7/25/2019  3:00 PM French Benito PTA [facility]   7/29/2019  4:00 PM [facility]   7/31/2019  4:00 PM French Benito PTA [facility]

## 2019-07-23 ENCOUNTER — HOSPITAL ENCOUNTER (OUTPATIENT)
Dept: PHYSICAL THERAPY | Age: 39
Discharge: HOME | End: 2019-07-23
Payer: MEDICAID

## 2019-07-23 PROCEDURE — 97110 THERAPEUTIC EXERCISES: CPT

## 2019-07-23 PROCEDURE — 97112 NEUROMUSCULAR REEDUCATION: CPT

## 2019-07-23 NOTE — PROGRESS NOTES
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 The doctor said I need to gain some weight before I have the hip replacement. \"    OBJECTIVE    Modality rationale: patient declined   Min Type Additional Details    [] Estim:  []Unatt       []IFC  []Premod                        []Other:  []w/ice   []w/heat  Position:  Location:    [] Estim: []Att    []TENS instruct  []NMES                    []Other:  []w/US   []w/ice   []w/heat  Position:  Location:    []  Traction: [] Cervical       []Lumbar                       [] Prone          []Supine                       []Intermittent   []Continuous Lbs:  [] before manual  [] after manual    []  Ultrasound: []Continuous   [] Pulsed                           []1MHz   []3MHz W/cm2:  Location:    []  Iontophoresis with dexamethasone         Location: [] Take home patch   [] In clinic    []  Ice     []  heat  []  Ice massage  []  Laser   []  Anodyne Position:  Location:    []  Laser with stim  []  Other:  Position:  Location:    []  Vasopneumatic Device Pressure:       [] lo [] med [] hi   Temperature: [] lo [] med [] hi   [] Skin assessment post-treatment:  []intact []redness- no adverse reaction    []redness  adverse reaction:     10 min Therapeutic Exercise:  [x] See flow sheet :   Rationale: increase ROM and increase strength to improve the patients ability to perform ADLs    28 min Neuromuscular Re-education:  [x]  See flow sheet : hip/glut re-ed activities   Rationale: increase ROM, increase strength, improve coordination, improve balance and increase proprioception  to improve the patients ability to improve mobility, stance stability, and gait       With   [x] TE   [] TA   [x] neuro   [] other: Patient Education: [x] Review HEP    [] Progressed/Changed HEP based on:   [x] positioning   [x] body mechanics   [] transfers   [] heat/ice application    [] other:      Other Objective/Functional Measures:      Pain Level (0-10 scale) post treatment: 6.5    ASSESSMENT/Changes in Function: Pt reports improved pain today and demonstrated improved fluidity with movements in the clinic. He was able to complete all exercises today. He reports a slight increase in pain following treatment, but overall still feeling better. Limited squatting depth with TRX. Patient will continue to benefit from skilled PT services to modify and progress therapeutic interventions, address functional mobility deficits, address ROM deficits, address strength deficits, analyze and address soft tissue restrictions, analyze and cue movement patterns, analyze and modify body mechanics/ergonomics, assess and modify postural abnormalities, address imbalance/dizziness and instruct in home and community integration to attain remaining goals. [x]  See Plan of Care  []  See progress note/recertification  []  See Discharge Summary         Progress towards goals / Updated goals:  Short Term Goals: To be accomplished in 2 weeks: 1.   Pt will demonstrate (I) with HEP as evidenced by performance in clinic with minimal instruction              Eval = established              NOT MET: reports non-compliance. \"I've been going on walks instead\".   2.   Pt will demonstrate sit to stand from 18in chair without UE in order to increase functional strength and improve ability to transfer              Eval = unequal weight bearing             CURRENT: Deferred secondary to pain   Long Term Goals: To be accomplished in 10 treatments:  1.   Pt will demonstrate bilatearl hip strength minimum 4+/5 in order to improve ambulation distances in the community               Eval = 3-4/5              PROGRESSING:        Hip flexion right 4+/5, left 4/5                                                  Hip abduction right 4/5, left 4-/5                                                  Hip extension right 4/5, left 4-/5              Deferred secondary to high pain levels   2.   Pt will perform 5x sit to  20 seconds or less in order to demonstrate normalized functional leg strength and reduce risk of falls              Eval = unequal weight bearing, 2x only              NOT MET; 2x in 20 seconds              Deferred secondary to high pain levels  3.   Pt will demonstrate 10meter walk test in 5.5seconds in order to progress towards normal ambulation gait speeds for crossing the street              Eval = 6.63sec = 0.90m/sec = community ambulator              PROGRESSING; 6.22 = 0.96 m/sec= community ambulator              Deferred secondary to high pain levels  4.   Pt will score at least 46 on FOTO in order to improve overall function, decrease pain, and facilitate return to PLOF.               Eval = 37              PROGRESSING; 44              To be reassessed at PN    PLAN  []  Upgrade activities as tolerated     [x]  Continue plan of care  []  Update interventions per flow sheet       []  Discharge due to:_  []  Other:_      Michael Flannery PTA, [facility] 7/23/2019  4:16 PM    Future Appointments   Date Time Provider Joseph Barber   7/25/2019  3:00 PM [facility]   7/29/2019  4:00 PM [facility]   7/31/2019  4:00 PM [facility]

## 2019-07-25 ENCOUNTER — HOSPITAL ENCOUNTER (OUTPATIENT)
Dept: PHYSICAL THERAPY | Age: 39
Discharge: HOME | End: 2019-07-25
Payer: MEDICAID

## 2019-07-25 PROCEDURE — 97110 THERAPEUTIC EXERCISES: CPT

## 2019-07-25 PROCEDURE — 97112 NEUROMUSCULAR REEDUCATION: CPT

## 2019-07-25 NOTE — PROGRESS NOTES
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 \"    OBJECTIVE    Modality rationale: patient declined   Min Type Additional Details    [] Estim:  []Unatt       []IFC  []Premod                        []Other:  []w/ice   []w/heat  Position:  Location:    [] Estim: []Att    []TENS instruct  []NMES                    []Other:  []w/US   []w/ice   []w/heat  Position:  Location:    []  Traction: [] Cervical       []Lumbar                       [] Prone          []Supine                       []Intermittent   []Continuous Lbs:  [] before manual  [] after manual    []  Ultrasound: []Continuous   [] Pulsed                           []1MHz   []3MHz W/cm2:  Location:    []  Iontophoresis with dexamethasone         Location: [] Take home patch   [] In clinic    []  Ice     []  heat  []  Ice massage  []  Laser   []  Anodyne Position:  Location:    []  Laser with stim  []  Other:  Position:  Location:    []  Vasopneumatic Device Pressure:       [] lo [] med [] hi   Temperature: [] lo [] med [] hi   [] Skin assessment post-treatment:  []intact []redness- no adverse reaction    []redness  adverse reaction:     15 min Therapeutic Exercise:  [x] See flow sheet :   Rationale: increase ROM and increase strength to improve the patients ability to perform ADLs    30 min Neuromuscular Re-education:  [x]  See flow sheet : hip/glut re-ed activities   Rationale: increase ROM, increase strength, improve coordination, improve balance and increase proprioception  to improve the patients ability to improve mobility, stance stability, and gait        With   [x] TE   [] TA   [x] neuro   [] other: Patient Education: [x] Review HEP    [] Progressed/Changed HEP based on:   [x] positioning   [x] body mechanics   [] transfers   [] heat/ice application    [] other:      Other Objective/Functional Measures:      Pain Level (0-10 scale) post treatment: 8.5    ASSESSMENT/Changes in Function: Pt was able to increase the volume of his exercises today. His pain remains elevated, but he reports noticing improved functional mobility and completion of activities around his home. Patient will continue to benefit from skilled PT services to modify and progress therapeutic interventions, address functional mobility deficits, address ROM deficits, address strength deficits, analyze and address soft tissue restrictions, analyze and cue movement patterns, analyze and modify body mechanics/ergonomics, assess and modify postural abnormalities, address imbalance/dizziness and instruct in home and community integration to attain remaining goals. [x]  See Plan of Care  []  See progress note/recertification  []  See Discharge Summary         Progress towards goals / Updated goals:  Short Term Goals: To be accomplished in 2 weeks: 1.   Pt will demonstrate (I) with HEP as evidenced by performance in clinic with minimal instruction              Eval = established              NOT MET: reports non-compliance. \"I've been going on walks instead\".   2.   Pt will demonstrate sit to stand from 18in chair without UE in order to increase functional strength and improve ability to transfer              Eval = unequal weight bearing             CURRENT: Deferred secondary to pain   Long Term Goals: To be accomplished in 10 treatments:  1.   Pt will demonstrate bilatearl hip strength minimum 4+/5 in order to improve ambulation distances in the community               Eval = 3-4/5              PROGRESSING:        Hip flexion right 4+/5, left 4/5                                                  Hip abduction right 4/5, left 4-/5                                                  Hip extension right 4/5, left 4-/5              Deferred secondary to high pain levels   2.   Pt will perform 5x sit to  20 seconds or less in order to demonstrate normalized functional leg strength and reduce risk of falls              Eval = unequal weight bearing, 2x only              NOT MET; 2x in 20 seconds              Deferred secondary to high pain levels  3.   Pt will demonstrate 10meter walk test in 5.5seconds in order to progress towards normal ambulation gait speeds for crossing the street              Eval = 6.63sec = 0.90m/sec = community ambulator              PROGRESSING; 6.22 = 0.96 m/sec= community ambulator              Deferred secondary to high pain levels  4.   Pt will score at least 46 on FOTO in order to improve overall function, decrease pain, and facilitate return to OF.               Eval = 37              PROGRESSING; 44              To be reassessed at PN    PLAN  []  Upgrade activities as tolerated     [x]  Continue plan of care  []  Update interventions per flow sheet       []  Discharge due to:_  []  Other:_      Tawnya Noonan PTA, [facility] 7/25/2019  3:52 PM    Future Appointments   Date Time Provider Joseph Barber   7/29/2019  4:00 PM Denilson Saavedra [facility]   7/31/2019  4:00 PM Jeff Parsons PTA [facility]

## 2019-07-29 ENCOUNTER — HOSPITAL ENCOUNTER (OUTPATIENT)
Dept: PHYSICAL THERAPY | Age: 39
Discharge: HOME | End: 2019-07-29
Payer: MEDICAID

## 2019-07-29 PROCEDURE — 97112 NEUROMUSCULAR REEDUCATION: CPT

## 2019-07-29 PROCEDURE — 97110 THERAPEUTIC EXERCISES: CPT

## 2019-07-29 NOTE — PROGRESS NOTES
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 \"    OBJECTIVE    Modality rationale: patient declined   Min Type Additional Details    [] Estim:  []Unatt       []IFC  []Premod                        []Other:  []w/ice   []w/heat  Position:  Location:    [] Estim: []Att    []TENS instruct  []NMES                    []Other:  []w/US   []w/ice   []w/heat  Position:  Location:    []  Traction: [] Cervical       []Lumbar                       [] Prone          []Supine                       []Intermittent   []Continuous Lbs:  [] before manual  [] after manual    []  Ultrasound: []Continuous   [] Pulsed                           []1MHz   []3MHz W/cm2:  Location:    []  Iontophoresis with dexamethasone         Location: [] Take home patch   [] In clinic    []  Ice     []  heat  []  Ice massage  []  Laser   []  Anodyne Position:  Location:    []  Laser with stim  []  Other:  Position:  Location:    []  Vasopneumatic Device Pressure:       [] lo [] med [] hi   Temperature: [] lo [] med [] hi   [] Skin assessment post-treatment:  []intact []redness- no adverse reaction    []redness  adverse reaction:     25 min Therapeutic Exercise:  [x] See flow sheet :   Rationale: increase ROM and increase strength to improve the patients ability to perform ADLs    25 min Neuromuscular Re-education:  [x]  See flow sheet : hip/glut re-ed activities   Rationale: increase ROM, increase strength, improve coordination, improve balance and increase proprioception  to improve the patients ability to improve mobility, stance stability, and gait        With   [x] TE   [] TA   [x] neuro   [] other: Patient Education: [x] Review HEP    [] Progressed/Changed HEP based on:   [x] positioning   [x] body mechanics   [] transfers   [] heat/ice application    [] other:      Other Objective/Functional Measures:      Pain Level (0-10 scale) post treatment: 7.5    ASSESSMENT/Changes in Function: Pt reports noticing improved functional mobility and strength. His pain continues to fluctuate, but has improved activity tolerance and needs fewer breaks during PT. Patient will continue to benefit from skilled PT services to modify and progress therapeutic interventions, address functional mobility deficits, address ROM deficits, address strength deficits, analyze and address soft tissue restrictions, analyze and cue movement patterns, analyze and modify body mechanics/ergonomics, assess and modify postural abnormalities, address imbalance/dizziness and instruct in home and community integration to attain remaining goals. [x]  See Plan of Care  []  See progress note/recertification  []  See Discharge Summary         Progress towards goals / Updated goals:  Short Term Goals: To be accomplished in 2 weeks: 1.   Pt will demonstrate (I) with HEP as evidenced by performance in clinic with minimal instruction              Eval = established              NOT MET: reports non-compliance. \"I've been going on walks instead\".   2.   Pt will demonstrate sit to stand from 18in chair without UE in order to increase functional strength and improve ability to transfer              Eval = unequal weight bearing             CURRENT: Deferred secondary to pain   Long Term Goals: To be accomplished in 10 treatments:  1.   Pt will demonstrate bilatearl hip strength minimum 4+/5 in order to improve ambulation distances in the community               Eval = 3-4/5              PROGRESSING:        Hip flexion right 4+/5, left 4/5                                                  Hip abduction right 4/5, left 4-/5                                                  Hip extension right 4/5, left 4-/5              Deferred secondary to high pain levels   2.   Pt will perform 5x sit to  20 seconds or less in order to demonstrate normalized functional leg strength and reduce risk of falls              Eval = unequal weight bearing, 2x only              NOT MET; 2x in 20 seconds              Deferred secondary to high pain levels  3.   Pt will demonstrate 10meter walk test in 5.5seconds in order to progress towards normal ambulation gait speeds for crossing the street              Eval = 6.63sec = 0.90m/sec = community ambulator              PROGRESSING; 6.22 = 0.96 m/sec= community ambulator              Deferred secondary to high pain levels  4.   Pt will score at least 46 on FOTO in order to improve overall function, decrease pain, and facilitate return to OF.               Eval = 37              PROGRESSING; 44              To be reassessed at PN    PLAN  []  Upgrade activities as tolerated     [x]  Continue plan of care  []  Update interventions per flow sheet       []  Discharge due to:_  []  Other:_      Ines Vargas PTA, [facility] 7/29/2019  4:49 PM    Future Appointments   Date Time Provider Joseph Barber   7/29/2019  4:[facility] [place] [facility]   7/31/2019  4:00 PM French Benito PTA [facility]   8/5/2019  4:30 PM [facility]   8/7/2019  2:00 PM French Benito PTA [facility]

## 2019-07-31 ENCOUNTER — HOSPITAL ENCOUNTER (OUTPATIENT)
Dept: PHYSICAL THERAPY | Age: 39
Discharge: HOME | End: 2019-07-31
Payer: MEDICAID

## 2019-07-31 PROCEDURE — 97112 NEUROMUSCULAR REEDUCATION: CPT

## 2019-07-31 PROCEDURE — 97110 THERAPEUTIC EXERCISES: CPT

## 2019-07-31 NOTE — PROGRESS NOTES
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 My lower back is what's hurting so much today. \"    OBJECTIVE    Modality rationale: patient declined   Min Type Additional Details    [] Estim:  []Unatt       []IFC  []Premod                        []Other:  []w/ice   []w/heat  Position:  Location:    [] Estim: []Att    []TENS instruct  []NMES                    []Other:  []w/US   []w/ice   []w/heat  Position:  Location:    []  Traction: [] Cervical       []Lumbar                       [] Prone          []Supine                       []Intermittent   []Continuous Lbs:  [] before manual  [] after manual    []  Ultrasound: []Continuous   [] Pulsed                           []1MHz   []3MHz W/cm2:  Location:    []  Iontophoresis with dexamethasone         Location: [] Take home patch   [] In clinic    []  Ice     []  heat  []  Ice massage  []  Laser   []  Anodyne Position:  Location:    []  Laser with stim  []  Other:  Position:  Location:    []  Vasopneumatic Device Pressure:       [] lo [] med [] hi   Temperature: [] lo [] med [] hi   [] Skin assessment post-treatment:  []intact []redness- no adverse reaction    []redness  adverse reaction:     11 min Therapeutic Exercise:  [x] See flow sheet :   Rationale: increase ROM and increase strength to improve the patients ability to perform ADLs    30 min Neuromuscular Re-education:  _  See flow sheet : hip/glut re-ed activities   Rationale: increase ROM, increase strength, improve coordination, improve balance and increase proprioception  to improve the patients ability to improve mobility, stance stability, and gait        With   [x] TE   [] TA   [x] neuro   [] other: Patient Education: [x] Review HEP    [] Progressed/Changed HEP based on:   [x] positioning   [x] body mechanics   [] transfers   [] heat/ice application    [] other:      Other Objective/Functional Measures:      Pain Level (0-10 scale) post treatment: 9.5    ASSESSMENT/Changes in Function: Pt's pain continues to wax and wane depending on the day. He reports elevated pain in the lower back and left hip with weight bearing activities. Patient will continue to benefit from skilled PT services to modify and progress therapeutic interventions, address functional mobility deficits, address ROM deficits, address strength deficits, analyze and address soft tissue restrictions, analyze and cue movement patterns, analyze and modify body mechanics/ergonomics, assess and modify postural abnormalities, address imbalance/dizziness and instruct in home and community integration to attain remaining goals. [x]  See Plan of Care  []  See progress note/recertification  []  See Discharge Summary         Progress towards goals / Updated goals:  Short Term Goals: To be accomplished in 2 weeks: 1.   Pt will demonstrate (I) with HEP as evidenced by performance in clinic with minimal instruction              Eval = established              NOT MET: reports non-compliance. \"I've been going on walks instead\".   2.   Pt will demonstrate sit to stand from 18in chair without UE in order to increase functional strength and improve ability to transfer              Eval = unequal weight bearing             CURRENT: Deferred secondary to pain   Long Term Goals: To be accomplished in 10 treatments:  1.   Pt will demonstrate bilatearl hip strength minimum 4+/5 in order to improve ambulation distances in the community               Eval = 3-4/5              PROGRESSING:        Hip flexion right 4+/5, left 4/5                                                  Hip abduction right 4/5, left 4-/5                                                  Hip extension right 4/5, left 4-/5              Deferred secondary to high pain levels   2.   Pt will perform 5x sit to  20 seconds or less in order to demonstrate normalized functional leg strength and reduce risk of falls              Eval = unequal weight bearing, 2x only              NOT MET; 2x in 20 seconds              Deferred secondary to high pain levels  3.   Pt will demonstrate 10meter walk test in 5.5seconds in order to progress towards normal ambulation gait speeds for crossing the street              Eval = 6.63sec = 0.90m/sec = community ambulator              PROGRESSING; 6.22 = 0.96 m/sec= community ambulator              Deferred secondary to high pain levels  4.   Pt will score at least 46 on FOTO in order to improve overall function, decrease pain, and facilitate return to OF.               Eval = 37              PROGRESSING; 44              To be reassessed at PN    PLAN  []  Upgrade activities as tolerated     [x]  Continue plan of care  []  Update interventions per flow sheet       []  Discharge due to:_  []  Other:_      Humble Gray PTA, [facility] 7/31/2019  4:50 PM    Future Appointments   Date Time Provider Joseph Barber   8/5/2019  4:30 PM Wm Adair PTA [facility]   8/7/2019  2:00 PM Wm Adair PTA [facility]

## 2019-08-05 ENCOUNTER — HOSPITAL ENCOUNTER (OUTPATIENT)
Dept: PHYSICAL THERAPY | Age: 39
Discharge: HOME | End: 2019-08-05
Payer: MEDICAID

## 2019-08-05 NOTE — PROGRESS NOTES
PT DAILY TREATMENT NOTE 10-18    Patient Name: Melvina Black  Date:2019  : 1980  [x]  Patient  Verified  Payor: Winnie Fields / Plan: [facility] COMMUNITY PLAN [facility] / Product Type: Managed Care Medicaid /    In time:435  Out time:511  Total Treatment Time (min): 36  Visit #: 7 of 8    Treatment Area: Pain in left hip [M25.552]  Low back pain [M54.5]  Unequal limb length (acquired), unspecified site [M21.70]    SUBJECTIVE  Pain Level (0-10 scale): 6.5  Any medication changes, allergies to medications, adverse drug reactions, diagnosis change, or new procedure performed?: [x] No    [] Yes (see summary sheet for update)  Subjective functional status/changes:   [] No changes reported  Pt states he is having a better day today and thinks he will be able to do all of his exercises. OBJECTIVE    26 min Therapeutic Exercise:  [x] See flow sheet :   Rationale: increase ROM and increase strength to improve the patients ability to perform ADLs    10 min Therapeutic Activity:  [x]  See flow sheet :   Rationale: increase strength, improve coordination and improve balance  to improve the patients ability to ambulate community distances          With   [] TE   [] TA   [] neuro   [] other: Patient Education: [x] Review HEP    [] Progressed/Changed HEP based on:   [] positioning   [] body mechanics   [] transfers   [] heat/ice application    [] other:      Other Objective/Functional Measures: hip extension strength: right  5-/5, left 4/5; 5x sit to stand = 20.85seconds without use of UE with increased WB into right le     Pain Level (0-10 scale) post treatment: 7    ASSESSMENT/Changes in Function: Pt demonstrating a better exercise tolerance today due to decreased pain levels. Pt requiring decreased verbal cuing for completion with proper form. Pt met short term goal for sit to stand from a regular chair without use of UE and nearly met long term goal for 5x sit to stand.  Pt will benefit from a transition to a seated HEP in order to improve compliance; not done today due to time constraints     Patient will continue to benefit from skilled PT services to modify and progress therapeutic interventions, address functional mobility deficits, address ROM deficits, address strength deficits, analyze and address soft tissue restrictions, analyze and cue movement patterns, analyze and modify body mechanics/ergonomics and assess and modify postural abnormalities to attain remaining goals. []  See Plan of Care  []  See progress note/recertification  []  See Discharge Summary         Progress towards goals / Updated goals:  Short Term Goals: To be accomplished in 2 weeks:   1.   Pt will demonstrate (I) with HEP as evidenced by performance in clinic with minimal instruction              Eval = established             \" Some of them, but I don't have the best place to lay down and do them\"  2.   Pt will demonstrate sit to stand from 18in chair without UE in order to increase functional strength and improve ability to transfer              Eval = unequal weight bearing             MET - unequal weight distribution, however, able to complete  Long Term Goals: To be accomplished in 10 treatments:  1.   Pt will demonstrate bilatearl hip strength minimum 4+/5 in order to improve ambulation distances in the community               Eval = 3-4/5              PROGRESSING:        Hip flexion right 4+/5, left 4/5                                                  Hip abduction right 4/5, left 4-/5                                                  Hip extension right 4/5, left 4-/5              Hip extension: right = 5-/5, left = 4/5  2.   Pt will perform 5x sit to  20 seconds or less in order to demonstrate normalized functional leg strength and reduce risk of falls              Eval = unequal weight bearing, 2x only              NOT MET; 2x in 20 seconds              Progressin.85sec with increased right LE weight bearing  3.   Pt will demonstrate 10meter walk test in 5.5seconds in order to progress towards normal ambulation gait speeds for crossing the street              Eval = 6.63sec = 0.90m/sec = community ambulator              PROGRESSING; 6.22 = 0.96 m/sec= community ambulator              Reassess next visit if pain levels permit  4.   Pt will score at least 46 on FOTO in order to improve overall function, decrease pain, and facilitate return to PLOF.               Eval = 37              PROGRESSING; 44              To be reassessed at PN    PLAN  [x]  Upgrade activities as tolerated     [x]  Continue plan of care  []  Update interventions per flow sheet       []  Discharge due to:_  []  Other:_      Edy Vaz, PT 8/5/2019  4:55 PM    Future Appointments   Date Time Provider Joseph Barber   8/7/2019  2:00 PM Barbara Alejo PTA [facility]

## 2019-08-07 ENCOUNTER — APPOINTMENT (OUTPATIENT)
Dept: PHYSICAL THERAPY | Age: 39
End: 2019-08-07
Payer: MEDICAID

## 2019-08-12 ENCOUNTER — HOSPITAL ENCOUNTER (OUTPATIENT)
Dept: PHYSICAL THERAPY | Age: 39
Discharge: HOME | End: 2019-08-12
Payer: MEDICAID

## 2019-08-12 PROCEDURE — 97530 THERAPEUTIC ACTIVITIES: CPT

## 2019-08-12 PROCEDURE — 97110 THERAPEUTIC EXERCISES: CPT

## 2019-08-12 NOTE — PROGRESS NOTES
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 \"    OBJECTIVE    Modality rationale: patient declined   Min Type Additional Details    [] Estim:  []Unatt       []IFC  []Premod                        []Other:  []w/ice   []w/heat  Position:  Location:    [] Estim: []Att    []TENS instruct  []NMES                    []Other:  []w/US   []w/ice   []w/heat  Position:  Location:    []  Traction: [] Cervical       []Lumbar                       [] Prone          []Supine                       []Intermittent   []Continuous Lbs:  [] before manual  [] after manual    []  Ultrasound: []Continuous   [] Pulsed                           []1MHz   []3MHz W/cm2:  Location:    []  Iontophoresis with dexamethasone         Location: [] Take home patch   [] In clinic    []  Ice     []  heat  []  Ice massage  []  Laser   []  Anodyne Position:  Location:    []  Laser with stim  []  Other:  Position:  Location:    []  Vasopneumatic Device Pressure:       [] lo [] med [] hi   Temperature: [] lo [] med [] hi   [] Skin assessment post-treatment:  []intact []redness- no adverse reaction    []redness  adverse reaction:     25 min Therapeutic Exercise:  [x] See flow sheet :   Rationale: increase ROM and increase strength to improve the patients ability to perform ADLs    25 min Therapeutic Activity:  [x]  See flow sheet : sit to stands   Rationale: increase ROM, increase strength, improve coordination, improve balance and increase proprioception  to improve the patients ability to improve mobility, stance stability, and gait         With   [x] TE   [x] TA   [] neuro   [] other: Patient Education: [x] Review HEP    [] Progressed/Changed HEP based on:   [x] positioning   [x] body mechanics   [] transfers   [] heat/ice application    [] other:      Other Objective/Functional Measures:   FOTO 41    MMT right hip flexion 5/5  MMT left hip flexion 4+/5  MMT right hip abduction 4+/5  MMT left hip abduction 4/5  MMT right hip extension 4/5  MMT left hip extension 4-/5    10m walk test: 6.00 = 1.0 m/sec    5x sit to stands = 19.67 seconds     Pain Level (0-10 scale) post treatment: 8    ASSESSMENT/Changes in Function: Mr. Rahul Harper has been a pleasure to treat and reports 30-40% improvement since beginning therapy. Pt reports and demonstrates improved strength and walking/standing tolerance. He has also improved with his sit to stands although has uneven weight bearing. He reports still having difficulty with stair negotiation and walking on uneven surfaces. We will continue with therapy once per week for four more weeks to address his remaining functional deficits. Patient will continue to benefit from skilled PT services to modify and progress therapeutic interventions, address functional mobility deficits, address ROM deficits, address strength deficits, analyze and address soft tissue restrictions, analyze and cue movement patterns, analyze and modify body mechanics/ergonomics, assess and modify postural abnormalities, address imbalance/dizziness and instruct in home and community integration to attain remaining goals.      [x]  See Plan of Care  [x]  See progress note/recertification  []  See Discharge Summary         Progress towards goals / Updated goals:  Short Term Goals: To be accomplished in 2 weeks: 1.   Pt will demonstrate (I) with HEP as evidenced by performance in clinic with minimal instruction              Eval = established             NOT MET; reports inconsistent compliance  2.   Pt will demonstrate sit to stand from 18in chair without UE in order to increase functional strength and improve ability to transfer              Eval = unequal weight bearing             MET - unequal weight distribution, however, able to complete  Long Term Goals: To be accomplished in 10 treatments:  1.   Pt will demonstrate bilatearl hip strength minimum 4+/5 in order to improve ambulation distances in the community               Eval = 3-4/5              PROGRESSING:        Hip flexion right 4+/5, left 4/5                                                  Hip abduction right 4/5, left 4-/5                                                  Hip extension right 4/5, left 4-/5                PROGRESSING;    Hip flexion right 5/5, left 4+/5                                                  Hip abduction right 4+/5, left 4/5                                                  Hip extension right 4/5, left 4-/5  2.   Pt will perform 5x sit to  20 seconds or less in order to demonstrate normalized functional leg strength and reduce risk of falls              Eval = unequal weight bearing, 2x only              NOT MET; 2x in 20 seconds              MET; 19.67 seconds  3.   Pt will demonstrate 10meter walk test in 5.5seconds in order to progress towards normal ambulation gait speeds for crossing the street              Eval = 6.63sec = 0.90m/sec = community ambulator              PROGRESSING; 6.22 = 0.96 m/sec= community ambulator              PROGRESSING; 6.00 seconds = 1.0 m/sec  4.   Pt will score at least 46 on FOTO in order to improve overall function, decrease pain, and facilitate return to PLOF.               Eval = 37              PROGRESSING; 44              NOT MET; 41    Functional Gains: walking/standing tolerance, strength, sit to stands, squatting  Functional Deficits: pain, stairs, walking/standing long periods, walking on uneven surfaces  % improvement: 30-40%  Pain   Average: 6-7/10       Best: 5/10     Worst: 10/10  Patient Goal: \"get hip replaced and do things that are too difficult because of my hip. \"    PLAN  []  Upgrade activities as tolerated     [x]  Continue plan of care  []  Update interventions per flow sheet       []  Discharge due to:_  []  Other:_      Geoff Navarrete PTA, CSCS 8/12/2019  4:28 PM    Future Appointments   Date Time Provider Joseph Barber   8/12/2019  3:30 PM George Torre PTA [facility]

## 2019-08-13 NOTE — PROGRESS NOTES
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.70]  Payor: [facility] MEDICAID / Plan: [facility] COMMUNITY PLAN [employer] / Product Type: Managed Care Medicaid /  Onset Date:19                Treatment Diagnosis: left hip and low back pain    Prior Hospitalization: see medical history Provider#: [id number]   Medications: Verified on Patient summary List    Comorbidities: congenital hip dysplasia, leg length discrepancy, OA, depression, asthma, scoliosis, tobacco use   Prior Level of Function: mod (I) with SPC    Visits from Start of Care: 16    Missed Visits: 1    Established Goals:    Short Term Goals: To be accomplished in 2 weeks:   1.   Pt will demonstrate (I) with HEP as evidenced by performance in clinic with minimal instruction              Eval = established              NOT MET; reports inconsistent compliance  2.   Pt will demonstrate sit to stand from 18in chair without UE in order to increase functional strength and improve ability to transfer              Eval = unequal weight bearing             MET - unequal weight distribution, however, able to complete  Long Term Goals: To be accomplished in 10 treatments:  1.   Pt will demonstrate bilatearl hip strength minimum 4+/5 in order to improve ambulation distances in the community               Eval = 3-4/5              PROGRESSING:        Hip flexion right 4+/5, left 4/5                                                  Hip abduction right 4/5, left 4-/5                                                  Hip extension right 4/5, left 4-/5                 PROGRESSING;        Hip flexion right 5/5, left 4+/5                                                  Hip abduction right 4+/5, left 4/5                                                  Hip extension right 4/5, left 4-/5  2.   Pt will perform 5x sit to  20 seconds or less in order to demonstrate normalized functional leg strength and reduce risk of falls              Eval = unequal weight bearing, 2x only              NOT MET; 2x in 20 seconds              MET; 19.67 seconds  3.   Pt will demonstrate 10meter walk test in 5.5seconds in order to progress towards normal ambulation gait speeds for crossing the street              Eval = 6.63sec = 0.90m/sec = community ambulator              PROGRESSING; 6.22 = 0.96 m/sec= community ambulator              PROGRESSING; 6.00 seconds = 1.0 m/sec  4.   Pt will score at least 46 on FOTO in order to improve overall function, decrease pain, and facilitate return to PLOF.             Eval = 37              PROGRESSING; 44              NOT MET; 41    Key Functional Changes:   Functional Gains: walking/standing tolerance, strength, sit to stands, squatting  Functional Deficits: pain, stairs, walking/standing long periods, walking on uneven surfaces  % improvement: 30-40%  Pain   Average: 6-7/10                  Best: 5/10                Worst: 10/10  Patient Goal: \"get hip replaced and do things that are too difficult because of my hip. \"    Updated Goals: to be achieved in 4 weeks: 1.   Pt will demonstrate (I) with seated HEP as evidenced by performance in clinic with minimal instruction.    PN status: NOT MET; reports inconsistent compliance - adjusted  2.   Pt will demonstrate bilatearl hip strength minimum 4+/5 in order to improve ambulation distances in the community    PN status: PROGRESSING;        Hip flexion right 5/5, left 4+/5                                                  Hip abduction right 4+/5, left 4/5                                                  Hip extension right 4/5, left 4-/5  3.   Pt will demonstrate 10meter walk test in 5.5seconds in order to progress towards normal ambulation gait speeds for crossing the street   PN status: PROGRESSING; 6.00 seconds = 1.0 m/sec  4.   Pt will score at least 46 on FOTO in order to improve overall function, decrease pain, and facilitate return to PLOF. PN status: NOT MET; 41    ASSESSMENT/RECOMMENDATIONS:Mr. Zuniga has been a pleasure to treat and reports 30-40% improvement since beginning therapy. Pt reports and demonstrates improved strength and walking/standing tolerance. He has also improved with his sit to stands although has uneven weight bearing. He reports still having difficulty with stair negotiation and walking on uneven surfaces. We will continue with therapy once per week for four more weeks to address his remaining functional deficits. [x]Continue therapy per initial plan/protocol at a frequency of  1 x per week for 4 weeks  []Continue therapy with the following recommended changes:_____________________      _____________________________________________________________________  []Discontinue therapy progressing towards or have reached established goals  []Discontinue therapy due to lack of appreciable progress towards goals  []Discontinue therapy due to lack of attendance or compliance  []Await Physician's recommendations/decisions regarding therapy  []Other:________________________________________________________________    Thank you for this referral.    Jeremías Ramírez, PT 8/13/2019 10:01 AM  NOTE TO PHYSICIAN:  [address]   FAX TO [facility]: ([phone]  If you are unable to process this request in 24 hours please contact our office: [phone]    []  I have read the above report and request that my patient continue as recommended.   []  I have read the above report and request that my patient continue therapy with the following changes/special instructions:________________________________________  [] I have read the above report and request that my patient be discharged from therapy.     [de-identified] Signature:____________Date:_________TIME:________    [employer], Date and Time must be completed for valid certification **

## 2019-08-19 ENCOUNTER — HOSPITAL ENCOUNTER (OUTPATIENT)
Dept: PHYSICAL THERAPY | Age: 39
Discharge: HOME | End: 2019-08-19
Payer: MEDICAID

## 2019-08-19 PROCEDURE — 97530 THERAPEUTIC ACTIVITIES: CPT

## 2019-08-19 PROCEDURE — 97110 THERAPEUTIC EXERCISES: CPT

## 2019-08-19 NOTE — PROGRESS NOTES
PT DAILY TREATMENT NOTE 10-18    Patient Name: Rosaline Gates [place]  Date:2019  : 1980  [x]  Patient  Verified  Payor: Thien Lopez / Plan: [facility] COMMUNITY PLAN [facility] / Product Type: Managed Care Medicaid /    In time: 3:50  Out time: 4:27  Total Treatment Time (min): 37  Visit #: 1 of 4    Treatment Area: Pain in left hip [M25.552]  Low back pain [M54.5]  Unequal limb length (acquired), unspecified site [M21.70]    SUBJECTIVE  Pain Level (0-10 scale): 7/10  Any medication changes, allergies to medications, adverse drug reactions, diagnosis change, or new procedure performed?: [x] No    [] Yes (see summary sheet for update)  Subjective functional status/changes:   [] No changes reported  \"My average pain is about a 6-6.5 so a 7 is just kind of sore for me. I was in a lot of pain over the weekend because my hip was out of alignment and I couldn't do anything at all, but some time yesterday I felt it pop back in and the pain went away. \"    OBJECTIVE    27 min Therapeutic Exercise:  [x] See flow sheet :   Rationale: increase ROM, increase strength and improve coordination to improve the patients ability to perform ADLs with ease    10 min Therapeutic Activity:  [x]  See flow sheet :   Rationale: increase ROM, increase strength, improve coordination, improve balance and increase proprioception  to improve the patients ability to  perform daily tasks and return to PLOF          With   [] TE   [] TA   [] neuro   [] other: Patient Education: [x] Review HEP    [] Progressed/Changed HEP based on:   [] positioning   [] body mechanics   [] transfers   [] heat/ice application    [] other:      Other Objective/Functional Measures: HR/TR increase to 20x     Pain Level (0-10 scale) post treatment: 7/10    ASSESSMENT/Changes in Function: Patient responded well to therapy today; showed good tolerance with no increase in pain.     Patient will continue to benefit from skilled PT services to modify and progress therapeutic interventions, address functional mobility deficits, address ROM deficits, address strength deficits, analyze and address soft tissue restrictions, analyze and cue movement patterns, analyze and modify body mechanics/ergonomics, assess and modify postural abnormalities, address imbalance/dizziness and instruct in home and community integration to attain remaining goals. [x]  See Plan of Care  []  See progress note/recertification  []  See Discharge Summary         Progress towards goals / Updated goals:  Short Term Goals: To be accomplished in 2 weeks:   1.   Pt will demonstrate (I) with HEP as evidenced by performance in clinic with minimal instruction              Eval = established              NOT MET; reports inconsistent compliance  2.   Pt will demonstrate sit to stand from 18in chair without UE in order to increase functional strength and improve ability to transfer              Eval = unequal weight bearing             MET - unequal weight distribution, however, able to complete  Long Term Goals: To be accomplished in 10 treatments:  1.   Pt will demonstrate bilatearl hip strength minimum 4+/5 in order to improve ambulation distances in the community               Eval = 3-4/5              PROGRESSING: Hip flexion right 4+/5, left 4/5    PLAN  []  Upgrade activities as tolerated     [x]  Continue plan of care  []  Update interventions per flow sheet       []  Discharge due to:_  []  Other:_      KHADAR Meyers 8/19/2019  3:58 PM    Future Appointments   Date Time Provider Joseph Barber   8/19/2019  4:00 PM Cornel Contreras [facility]   8/26/2019  4:00 PM Pina Horowitz PTA [facility]

## 2019-08-26 ENCOUNTER — HOSPITAL ENCOUNTER (OUTPATIENT)
Dept: PHYSICAL THERAPY | Age: 39
Discharge: HOME | End: 2019-08-26
Payer: MEDICAID

## 2019-08-26 PROCEDURE — 97530 THERAPEUTIC ACTIVITIES: CPT

## 2019-08-26 PROCEDURE — 97110 THERAPEUTIC EXERCISES: CPT

## 2019-08-26 NOTE — PROGRESS NOTES
PT DAILY TREATMENT NOTE 10-18    Patient Name: Wojciech Zuniga  Date:2019  : 1980  [x]  Patient  Verified  Payor: Yunior Humphrey / Plan: [facility] COMMUNITY PLAN [employer] [employer] / Product Type: Managed Care Medicaid /    In time:407  Out time:431  Total Treatment Time (min): 24  Visit #: 2 of 4    Treatment Area: Pain in left hip [M25.552]  Low back pain [M54.5]  Unequal limb length (acquired), unspecified site [M21.70]    SUBJECTIVE  Pain Level (0-10 scale): 9-10  Any medication changes, allergies to medications, adverse drug reactions, diagnosis change, or new procedure performed?: [x] No    [] Yes (see summary sheet for update)  Subjective functional status/changes:   [] No changes reported  \"I've been hurting more the past week from the rainy weather. \"    OBJECTIVE    Modality rationale: patient declined   Min Type Additional Details    [] Estim:  []Unatt       []IFC  []Premod                        []Other:  []w/ice   []w/heat  Position:  Location:    [] Estim: []Att    []TENS instruct  []NMES                    []Other:  []w/US   []w/ice   []w/heat  Position:  Location:    []  Traction: [] Cervical       []Lumbar                       [] Prone          []Supine                       []Intermittent   []Continuous Lbs:  [] before manual  [] after manual    []  Ultrasound: []Continuous   [] Pulsed                           []1MHz   []3MHz W/cm2:  Location:    []  Iontophoresis with dexamethasone         Location: [] Take home patch   [] In clinic    []  Ice     []  heat  []  Ice massage  []  Laser   []  Anodyne Position:  Location:    []  Laser with stim  []  Other:  Position:  Location:    []  Vasopneumatic Device Pressure:       [] lo [] med [] hi   Temperature: [] lo [] med [] hi   [] Skin assessment post-treatment:  []intact []redness- no adverse reaction    []redness  adverse reaction:     10 min Therapeutic Exercise:  [x] See flow sheet :   Rationale: increase ROM and increase strength to improve the patients ability to perform ADLs    14 min Therapeutic Activity:  [x]  See flow sheet :  Sit to stands   Rationale: increase ROM, increase strength, improve coordination, improve balance and increase proprioception  to improve the patients ability to improve mobility, stance stability, and gait        With   [x] TE   [x] TA   [] neuro   [] other: Patient Education: [x] Review HEP    [] Progressed/Changed HEP based on:   [x] positioning   [x] body mechanics   [] transfers   [] heat/ice application    [] other:      Other Objective/Functional Measures:      Pain Level (0-10 scale) post treatment: 9-10    ASSESSMENT/Changes in Function: Pt reported elevated pain today secondary to weather. He reports pain from lower thoracic to B hips. He deferred several exercises secondary to pain. Discussed potentially transitioning to updated HEP at the end of his current POC. Patient will continue to benefit from skilled PT services to modify and progress therapeutic interventions, address functional mobility deficits, address ROM deficits, address strength deficits, analyze and address soft tissue restrictions, analyze and cue movement patterns, analyze and modify body mechanics/ergonomics, assess and modify postural abnormalities, address imbalance/dizziness and instruct in home and community integration to attain remaining goals. [x]  See Plan of Care  []  See progress note/recertification  []  See Discharge Summary         Progress towards goals / Updated goals:  Short Term Goals: To be accomplished in 2 weeks:   1.   Pt will demonstrate (I) with HEP as evidenced by performance in clinic with minimal instruction              Eval = established              NOT MET; reports inconsistent compliance  2.   Pt will demonstrate sit to stand from 18in chair without UE in order to increase functional strength and improve ability to transfer              Eval = unequal weight bearing             MET - unequal weight distribution, however, able to complete  Long Term Goals: To be accomplished in 10 treatments:  1.   Pt will demonstrate bilatearl hip strength minimum 4+/5 in order to improve ambulation distances in the community               Eval = 3-4/5              PROGRESSING: Hip flexion right 4+/5, left 4/5    PLAN  []  Upgrade activities as tolerated     [x]  Continue plan of care  []  Update interventions per flow sheet       []  Discharge due to:_  []  Other:_      Geoff Navarrete PTA, CSCS 8/26/2019  4:36 PM    No future appointments.

## 2019-09-05 ENCOUNTER — APPOINTMENT (OUTPATIENT)
Dept: PHYSICAL THERAPY | Age: 39
End: 2019-09-05
Payer: MEDICAID

## 2019-09-10 ENCOUNTER — HOSPITAL ENCOUNTER (OUTPATIENT)
Dept: PHYSICAL THERAPY | Age: 39
Discharge: HOME | End: 2019-09-10
Payer: MEDICAID

## 2019-09-10 PROCEDURE — 97530 THERAPEUTIC ACTIVITIES: CPT

## 2019-09-10 PROCEDURE — 97110 THERAPEUTIC EXERCISES: CPT

## 2019-09-10 NOTE — PROGRESS NOTES
PT DAILY TREATMENT NOTE 10-18    Patient Name: Jamila García  Date:9/10/2019  : 1980  [x]  Patient  Verified  Payor: Oneal Peters / Plan: [facility] COMMUNITY PLAN [place] [employer] / Product Type: Managed Care Medicaid /    In time:230  Out time:313  Total Treatment Time (min): 43  Visit #: 3 of 4      Treatment Area: Pain in left hip [M25.552]  Low back pain [M54.5]  Unequal limb length (acquired), unspecified site [M21.70]    SUBJECTIVE  Pain Level (0-10 scale): 6.5  Any medication changes, allergies to medications, adverse drug reactions, diagnosis change, or new procedure performed?: [x] No    [] Yes (see summary sheet for update)  Subjective functional status/changes:   [] No changes reported  \"I'm sore from arthritis pain today but I know what that's coming from. \"    OBJECTIVE            Modality rationale: patient declined   Min Type Additional Details      [] Estim:  []Unatt       []IFC  []Premod                        []Other:  []w/ice   []w/heat  Position:  Location:      [] Estim: []Att    []TENS instruct  []NMES                    []Other:  []w/US   []w/ice   []w/heat  Position:  Location:      []  Traction: [] Cervical       []Lumbar                       [] Prone          []Supine                       []Intermittent   []Continuous Lbs:  [] before manual  [] after manual      []  Ultrasound: []Continuous   [] Pulsed                           []1MHz   []3MHz W/cm2:  Location:      []  Iontophoresis with dexamethasone         Location: [] Take home patch   [] In clinic      []  Ice     []  heat  []  Ice massage  []  Laser   []  Anodyne Position:  Location:      []  Laser with stim  []  Other:  Position:  Location:      []  Vasopneumatic Device Pressure:       [] lo [] med [] hi   Temperature: [] lo [] med [] hi    [] Skin assessment post-treatment:  []intact []redness- no adverse reaction    []redness  adverse reaction:      10 min Therapeutic Exercise:  [x] See flow sheet :   Rationale: increase ROM and increase strength to improve the patients ability to perform ADLs     33 min Therapeutic Activity:  [x]  See flow sheet :  Sit to stands, squatting activities    Rationale: increase ROM, increase strength, improve coordination, improve balance and increase proprioception  to improve the patients ability to improve mobility, stance stability, and gait          With   [] TE   [] TA   [] neuro   [] other: Patient Education: [x] Review HEP    [] Progressed/Changed HEP based on:   [] positioning   [] body mechanics   [] transfers   [] heat/ice application    [] other:      Other Objective/Functional Measures:      Pain Level (0-10 scale) post treatment: 7.5    ASSESSMENT/Changes in Function: progressed some standing core activation exercises today. aptient with good TA control seen, cuing needed for PPT. Limited with stair negotiation on left due to quad weakness. Patient will continue to benefit from skilled PT services to modify and progress therapeutic interventions, address functional mobility deficits, address ROM deficits, address strength deficits, analyze and address soft tissue restrictions, analyze and cue movement patterns, analyze and modify body mechanics/ergonomics, assess and modify postural abnormalities, address imbalance/dizziness and instruct in home and community integration to attain remaining goals. []  See Plan of Care  []  See progress note/recertification  []  See Discharge Summary         Progress towards goals / Updated goals:  Short Term Goals: To be accomplished in 2 weeks:   1.   Pt will demonstrate (I) with HEP as evidenced by performance in clinic with minimal instruction              Eval = established              NOT MET; reports inconsistent compliance  2.   Pt will demonstrate sit to stand from 18in chair without UE in order to increase functional strength and improve ability to transfer              Eval = unequal weight bearing             MET - unequal weight distribution, however, able to complete  Long Term Goals: To be accomplished in 10 treatments:  1.   Pt will demonstrate bilatearl hip strength minimum 4+/5 in order to improve ambulation distances in the community               Eval = 3-4/5              PROGRESSING: Hip flexion right 4+/5, left 4/5    PLAN  []  Upgrade activities as tolerated     [x]  Continue plan of care  []  Update interventions per flow sheet       []  Discharge due to:_  []  Other:_      Luke Bosch PT 9/10/2019  4:26 PM    Future Appointments   Date Time Provider Joseph Barber   9/19/2019  4:00 PM [facility]

## 2019-09-19 ENCOUNTER — HOSPITAL ENCOUNTER (OUTPATIENT)
Dept: PHYSICAL THERAPY | Age: 39
Discharge: HOME | End: 2019-09-19
Payer: MEDICAID

## 2019-09-19 PROCEDURE — 97530 THERAPEUTIC ACTIVITIES: CPT

## 2019-09-19 NOTE — PROGRESS NOTES
PT DISCHARGE DAILY NOTE AND [id number]    Patient name: Bettye Cobos Start of Care: 2019   Referral source: Shaunna Nina MD : 1980                Medical Diagnosis: Pain in left hip [M25.552]  Low back pain [M54.5]  Unequal limb length (acquired), unspecified site [M21.70]  Payor: [facility] MEDICAID / Plan:  [facility] / Product Type: Managed Care Medicaid /  Onset Date:19                Treatment Diagnosis: left hip and low back pain    Prior Hospitalization: see medical history Provider#: [id number]   Medications: Verified on Patient summary List    Comorbidities: congenital hip dysplasia, leg length discrepancy, OA, depression, asthma, scoliosis, tobacco use   Prior Level of Function: mod (I) with [facility]    Visits from Start of Care: 20    Missed Visits: 1    Reporting Period : 19 to 19    Date:2019  : 1980  [x]  Patient  Verified  Payor: [facility] MEDICAID / Plan: [facility] COMMUNITY PLAN [place] [employer] / Product Type: Managed Care Medicaid /    In time:402  Out time:444  Total Treatment Time (min): 42  Visit #: 4 of 4    SUBJECTIVE  Pain Level (0-10 scale): 9  Any medication changes, allergies to medications, adverse drug reactions, diagnosis change, or new procedure performed?: [x] No    [] Yes (see summary sheet for update)  Subjective functional status/changes:   [] No changes reported  \"I am having a bad day today, but I will try. I think some of my increased pain lately has been my body trying to adjust to the weight gain, I was 132lbs at my last check. \" Pt reports he has been doing exercises daily and his program 2x/week.     OBJECTIVE    42 min Therapeutic Activity:  X See flow sheet :   Rationale: increase ROM, increase strength and improve balance to improve the patients ability to perform ADLs        With   [] TE   [] TA   [] neuro   [] other: Patient Education: [x] Review HEP    [] Progressed/Changed HEP based on:   [] positioning   [] body mechanics [] transfers   [] heat/ice application    [] other:      Other Objective/Functional Measures:   10meter walk test = 5.35sec = 1.12m/sec  Hip strength   Flexion: bilaterally = 5/5   Abduction: right = 5/5, left = 5-/5    Extension: bilaterally = 5/5    Functional Gains: sit to stand, lifting, walking  Functional Deficits: pain, sit to stand, car transfers when pain is high   % improvement:  40-50%  Pain   Average: 6.5/10       Best: 4.5-5/10     Worst: 10/10  Patient Goal: \"no more pain; get back to my life, go back to work and make my own money\"       Pain Level (0-10 scale) post treatment: 9    Summary of Care: 1.   Pt will demonstrate (I) with seated HEP as evidenced by performance in clinic with minimal instruction. PN status: NOT MET; reports inconsistent compliance - adjusted   PARTIALLY MET - reports 2x/week   2.   Pt will demonstrate bilatearl hip strength minimum 4+/5 in order to improve ambulation distances in the community               PN status: PROGRESSING;        Hip flexion right 5/5, left 4+/5                                                  Hip abduction right 4+/5, left 4/5                                                  Hip extension right 4/5, left 4-/5   MET     Flexion: bilaterally = 5/5    Abduction: right = 5/5, left = 5-/5     Extension: bilaterally = 5/5  3.   Pt will demonstrate 10meter walk test in 5.5seconds in order to progress towards normal ambulation gait speeds for crossing the street              PN status: PROGRESSING; 6.00 seconds = 1.0 m/sec   MET = 5.35sec = 1.12m/sec  4.   Pt will score at least 46 on FOTO in order to improve overall function, decrease pain, and facilitate return to PLOF. PN status: NOT MET; 41   NOT MET = 42       ASSESSMENT/Changes in Function: Mr. [employer] has been a pleasure to treat and reports 40-50% improvement since evaluation.  Pt has improved his ability to perform chair transfers and walk, but continues to have intermittent high pain levels. When pt is [place] a bad day\" he has increased difficulty with car transfers and ADLs. Pt is (I) with HEP at this time and is DC to manage program at home until [facility] is performed with [facility].      Thank you for this referral!      PLAN  [x]Discontinue therapy: [x]Patient has reached or is progressing toward set goals      []Patient is non-compliant or has abdicated      []Due to lack of appreciable progress towards set goals    Nubia Pak, PT 9/19/2019  4:13 PM

## 2020-02-25 ENCOUNTER — HOSPITAL ENCOUNTER (OUTPATIENT)
Dept: RESPIRATORY THERAPY | Age: 40
Discharge: HOME | End: 2020-02-25
Attending: INTERNAL MEDICINE
Payer: MEDICAID

## 2020-02-25 DIAGNOSIS — J43.2 CENTRILOBULAR EMPHYSEMA (HCC): ICD-10-CM

## 2020-02-25 PROCEDURE — 94060 EVALUATION OF WHEEZING: CPT

## 2020-02-25 PROCEDURE — 94727 GAS DIL/WSHOT DETER LNG VOL: CPT

## 2020-02-25 PROCEDURE — 94729 DIFFUSING CAPACITY: CPT

## 2021-06-18 ENCOUNTER — HOSPITAL ENCOUNTER (OUTPATIENT)
Dept: PHYSICAL THERAPY | Age: 41
Discharge: HOME | End: 2021-06-18
Payer: MEDICAID

## 2021-06-18 PROCEDURE — 97162 PT EVAL MOD COMPLEX 30 MIN: CPT

## 2021-06-18 NOTE — PROGRESS NOTES
PT DAILY TREATMENT NOTE     Patient Name: Emma Zuniga  Date:2021  : 1980  [x]  Patient  Verified  Payor: [facility] MEDICAID / Plan: [facility] COMMUNITY PLAN [place] [employer] / Product Type: Managed Care Medicaid /    In time:1207  Out time:1247  Total Treatment Time (min): 40  Visit #: 1 of 8    Treatment Area: Neck pain [M54.2]    SUBJECTIVE  Pain Level (0-10 scale): 7.5  Any medication changes, allergies to medications, adverse drug reactions, diagnosis change, or new procedure performed?: [x] No    [] Yes (see summary sheet for update)  Subjective functional status/changes:   [] No changes reported  See eval    OBJECTIVE    40 min [x]Eval                  []Re-Eval             With   [] TE   [] TA   [] neuro   [] other: Patient Education: [x] Review HEP    [] Progressed/Changed HEP based on:   [] positioning   [] body mechanics   [] transfers   [] heat/ice application    [] other:      Other Objective/Functional Measures: HEP established     Pain Level (0-10 scale) post treatment: 7-7.5    ASSESSMENT/Changes in Function: See POC    Patient will continue to benefit from skilled PT services to modify and progress therapeutic interventions, address functional mobility deficits, address ROM deficits, address strength deficits, analyze and address soft tissue restrictions, analyze and cue movement patterns, analyze and modify body mechanics/ergonomics, assess and modify postural abnormalities and instruct in home and community integration to attain remaining goals. [x]  See Plan of Care  []  See progress note/recertification  []  See Discharge Summary         Progress towards goals / Updated goals:  Short Term Goals: To be accomplished in 2 treatments:  1. Pt will report compliance with HEP in order to increase therapeutic outcomes. Eval=Established     Long Term Goals: To be accomplished in 8 treatments:  1. Pt will report max pain 7/10 in order to increase functional activity tolerance. Eval=9-9.5  2. Pt will report increased functional independence as noted by FOTO score of 61. Eval=51  3. Pt will demonstrate improved bilateral low trap and right rhomboid strength of 5/5 in order to improve ability to tolerate sustained postures. Eval=bilateral low trap 3/5, rhomboid 4+/5  4. Pt will demonstrate increased right  strength to 64lbs in order to improve ability to hold and carry items.                Eval=55.6lbs      PLAN  [x]  Upgrade activities as tolerated     [x]  Continue plan of care  []  Update interventions per flow sheet       []  Discharge due to:_  []  Other:_      FRANSISCO Hilliard 6/18/2021  12:53 PM    Future Appointments   Date Time Provider Joseph Barber   6/25/2021  5:45 PM [facility]

## 2021-06-18 NOTE — PROGRESS NOTES
[facility] [address]  [address], [address] [phone] fax    Plan of Care/ Statement of Necessity for Physical Therapy Services           Patient name: Leitha Apgar Start of Care: 2021   Referral source: Caridad Rowland MD : 1980    Medical Diagnosis: Neck pain [M54.2]  Payor: [facility] MEDICAID / Plan: [place] [employer] [place] [employer] / Product Type: Managed Care Medicaid /  Onset Date:May 2020    Treatment Diagnosis: neck pain   Prior Hospitalization: see medical history Provider#: [id number]   Medications: Verified on Patient summary List    Comorbidities: Depression, Arthritis, Tobacco Use, Asthma, Scoliosis, Congential Hip Displaysia   Prior Level of Function: functionally (I), right hand dominant, frequent computer use    The Plan of Care and following information is based on the information from the initial evaluation. Assessment/ key information:   Mr. Junior Gandhi is a 40 yo male who presents to PT with complaints of neck pain with UE referral patterns. Neuro examination demonstrates negative del toro's and UE reflexes within normal limits; Spurling's deferred due to pain with AROM cervical extension. Pt presented with decreased shoulder/parascapuar/ strength; however, no myotomal deficits were peresent. Pt demonstrates decreased cervical ROM and decreased postural tolerance. Pt's deficits impair his ability to perform ADLs at [facility], perform computer use for prolonged periods of time, look upwards, and sleep comfortably. Pt would benefit from skilled PT in order to address deficits, maximize functional independence, and improve overall QOL.     Evaluation Complexity History HIGH Complexity :3+ comorbidities / personal factors will impact the outcome/ POC ; Examination MEDIUM Complexity : 3 Standardized tests and measures addressing body structure, function, activity limitation and / or participation in recreation  ;Presentation MEDIUM Complexity : Evolving with changing characteristics  ; Clinical Decision Making MEDIUM Complexity : FOTO score of 26-74  Overall Complexity Rating: MEDIUM  Problem List: pain affecting function, decrease ROM, decrease strength, impaired gait/ balance, decrease ADL/ functional abilitiies, decrease activity tolerance, decrease flexibility/ joint mobility and decrease transfer abilities   Treatment Plan may include any combination of the following: Therapeutic exercise, Therapeutic activities, Neuromuscular re-education, Physical agent/modality, Manual therapy, Aquatic therapy, Patient education, Self Care training, Functional mobility training and Home safety training  Patient / Family readiness to learn indicated by: trying to perform skills and interest  Persons(s) to be included in education: patient (P)  Barriers to Learning/Limitations: None  Patient Goal (s): get back to working with computers and get rid of pain  Patient Self Reported Health Status: poor  Rehabilitation Potential: fair  Short Term Goals: To be accomplished in 2 treatments:  1. Pt will report compliance with HEP in order to increase therapeutic outcomes. Eval=Established    Long Term Goals: To be accomplished in 8 treatments:  1. Pt will report max pain 7/10 in order to increase functional activity tolerance. Eval=9-9.5  2. Pt will report increased functional independence as noted by FOTO score of 61. Eval=51  3. Pt will demonstrate improved bilateral low trap and right rhomboid strength of 5/5 in order to improve ability to tolerate sustained postures. Eval=bilateral low trap 3/5, rhomboid 4+/5  4. Pt will demonstrate increased right  strength to 64lbs in order to improve ability to hold and carry items. Eval=55.6lbs    Frequency / Duration: Patient to be seen 2 times per week for 8 treatments.     Patient/ Caregiver education and instruction: Diagnosis, prognosis, self care, activity modification and exercises   [x]  Plan of care has been reviewed with ABIGAIL Reyes, SPT 6/18/2021 12:53 PM    ________________________________________________________________________    I certify that the above Therapy Services are being furnished while the patient is under my care. I agree with the treatment plan and certify that this therapy is necessary.     [de-identified] Signature:____________Date:_________TIME:________     Lennox Copas, MD  ** Signature, Date and Time must be completed for valid certification **    Please sign and return to [facility] [place] [id number]  [address] [facility] [address], [address] [phone] fax

## 2021-07-01 ENCOUNTER — HOSPITAL ENCOUNTER (OUTPATIENT)
Dept: PHYSICAL THERAPY | Age: 41
Discharge: HOME | End: 2021-07-01
Payer: MEDICAID

## 2021-07-01 PROCEDURE — 97530 THERAPEUTIC ACTIVITIES: CPT

## 2021-07-01 PROCEDURE — 97110 THERAPEUTIC EXERCISES: CPT

## 2021-07-01 PROCEDURE — 97112 NEUROMUSCULAR REEDUCATION: CPT

## 2021-07-01 NOTE — PROGRESS NOTES
PT DAILY TREATMENT NOTE     Patient Name: Yasmin Ricktets  Date:2021  : 1980  [x]  Patient  Verified  Payor: [facility] MEDICAID / Plan: [employer] [place] [employer] / Product Type: Managed Care Medicaid /    In time:1200  Out time:1236  Total Treatment Time (min): 36  Visit #: 2 of 8    Treatment Area: Neck pain [M54.2]    SUBJECTIVE  Pain Level (0-10 scale): 4  Any medication changes, allergies to medications, adverse drug reactions, diagnosis change, or new procedure performed?: [x] No    [] Yes (see summary sheet for update)  Subjective functional status/changes:   [] No changes reported  \"My pain is minimal.\"    OBJECTIVE    Modality rationale: patient declined   Min Type Additional Details    [] Estim:  []Unatt       []IFC  []Premod                        []Other:  []w/ice   []w/heat  Position:  Location:    [] Estim: []Att    []TENS instruct  []NMES                    []Other:  []w/US   []w/ice   []w/heat  Position:  Location:    []  Traction: [] Cervical       []Lumbar                       [] Prone          []Supine                       []Intermittent   []Continuous Lbs:  [] before manual  [] after manual    []  Ultrasound: []Continuous   [] Pulsed                           []1MHz   []3MHz W/cm2:  Location:    []  Iontophoresis with dexamethasone         Location: [] Take home patch   [] In clinic    []  Ice     []  heat  []  Ice massage  []  Laser   []  Anodyne Position:  Location:    []  Laser with stim  []  Other:  Position:  Location:    []  Vasopneumatic Device    []  Right     []  Left  Pre-treatment girth:  Post-treatment girth:  Measured at (location):  Pressure:       [] lo [] med [] hi   Temperature: [] lo [] med [] hi   [] Skin assessment post-treatment:  []intact []redness- no adverse reaction    []redness  adverse reaction:     10 min Therapeutic Exercise:  [x] See flow sheet :   Rationale: increase ROM and increase strength to improve the patients ability to perform ADLs    10 min Therapeutic Activity:  [x]  See flow sheet : functional reaching/gripping activities   Rationale: increase ROM, increase strength, improve coordination, improve balance and increase proprioception  to improve the patients ability to improve mobility and ADL performance     16 min Neuromuscular Re-education:  [x]  See flow sheet : scap re-ed activities    Rationale: increase ROM, increase strength, improve coordination, improve balance and increase proprioception  to improve the patients ability to improve mobility and upright posture        With   [x] TE   [x] TA   [x] neuro   [] other: Patient Education: [x] Review HEP    [] Progressed/Changed HEP based on:   [x] positioning   [x] body mechanics   [] transfers   [] heat/ice application    [] other:      Other Objective/Functional Measures:      Pain Level (0-10 scale) post treatment: 7    ASSESSMENT/Changes in Function: Initiated POC. Pt is heavily guarded and painful. He reports numbness in left UT throughout. He states that he was very sore after completing his HEP for 5 days consecutively with 3 sets of 10 reps for each exercises. Advised pt to break them up throughout the day or decrease the amount of sets per exercise. Patient will continue to benefit from skilled PT services to modify and progress therapeutic interventions, address functional mobility deficits, address ROM deficits, address strength deficits, analyze and address soft tissue restrictions, analyze and cue movement patterns, analyze and modify body mechanics/ergonomics, assess and modify postural abnormalities, address imbalance/dizziness and instruct in home and community integration to attain remaining goals. [x]  See Plan of Care  []  See progress note/recertification  []  See Discharge Summary         Progress towards goals / Updated goals:  Short Term Goals: To be accomplished in 2 treatments:  1.   Pt will report compliance with HEP in order to increase therapeutic outcomes. Eval=Established    MET  Long Term Goals: To be accomplished in 8 treatments:  1. Pt will report max pain 7/10 in order to increase functional activity tolerance. Eval=9-9.5  2. Pt will report increased functional independence as noted by FOTO score of 61. Eval=51  3. Pt will demonstrate improved bilateral low trap and right rhomboid strength of 5/5 in order to improve ability to tolerate sustained postures. Eval=bilateral low trap 3/5, rhomboid 4+/5  4. Pt will demonstrate increased right  strength to 64lbs in order to improve ability to hold and carry items.                Eval=55.6lbs    PLAN  []  Upgrade activities as tolerated     [x]  Continue plan of care  []  Update interventions per flow sheet       []  Discharge due to:_  []  Other:_      Royal Adler PTA, [facility] 7/1/2021  12:38 PM    Future Appointments   Date Time Provider Joseph Barber   7/7/2021 12:00 PM [facility]   7/9/2021 12:00 PM Zunilda Bourgeois PT [facility]   7/14/2021 12:00 PM Alyssa Marvin PTA [facility]   7/16/2021 12:00 PM Ernestina Vergara, PT [facility]   7/21/2021 12:00 PM [facility]   7/23/2021 12:00 PM Ernestina Vergara PT [facility]

## 2021-07-07 ENCOUNTER — HOSPITAL ENCOUNTER (OUTPATIENT)
Dept: PHYSICAL THERAPY | Age: 41
Discharge: HOME | End: 2021-07-07
Payer: MEDICAID

## 2021-07-07 PROCEDURE — 97530 THERAPEUTIC ACTIVITIES: CPT

## 2021-07-07 PROCEDURE — 97110 THERAPEUTIC EXERCISES: CPT

## 2021-07-07 PROCEDURE — 97112 NEUROMUSCULAR REEDUCATION: CPT

## 2021-07-07 NOTE — PROGRESS NOTES
PT DAILY TREATMENT NOTE     Patient Name: Regis Mon  Date:2021  : 1980  [x]  Patient  Verified  Payor: [facility] MEDICAID / Plan: [employer] [place] [employer] / Product Type: Managed Care Medicaid /    In time:1156  Out time:1250  Total Treatment Time (min): 47  Visit #: 3 of 8    Treatment Area: Neck pain [M54.2]    SUBJECTIVE  Pain Level (0-10 scale): 4  Any medication changes, allergies to medications, adverse drug reactions, diagnosis change, or new procedure performed?: [x] No    [] Yes (see summary sheet for update)  Subjective functional status/changes:   [] No changes reported  Patient reports a burning sensation around his left shoulder blade. He decreased the frequency of doing his HEP which helped manage his muscle soreness.     OBJECTIVE    Modality rationale: decrease pain to improve the patients ability to perform ADLs   Min Type Additional Details    [] Estim:  []Unatt       []IFC  []Premod                        []Other:  []w/ice   []w/heat  Position:  Location:    [] Estim: []Att    []TENS instruct  []NMES                    []Other:  []w/US   []w/ice   []w/heat  Position:  Location:    []  Traction: [] Cervical       []Lumbar                       [] Prone          []Supine                       []Intermittent   []Continuous Lbs:  [] before manual  [] after manual    []  Ultrasound: []Continuous   [] Pulsed                           []1MHz   []3MHz W/cm2:  Location:    []  Iontophoresis with dexamethasone         Location: [] Take home patch   [] In clinic   10 []  Ice     [x]  heat  []  Ice massage  []  Laser   []  Anodyne Position: seated  Location: neck    []  Laser with stim  []  Other:  Position:  Location:    []  Vasopneumatic Device    []  Right     []  Left  Pre-treatment girth:  Post-treatment girth:  Measured at (location):  Pressure:       [] lo [] med [] hi   Temperature: [] lo [] med [] hi   [] Skin assessment post-treatment:  []intact []redness- no adverse reaction    []redness  adverse reaction:     12 min Therapeutic Exercise:  [] See flow sheet :   Rationale: increase ROM and increase strength to improve the patients ability to increase activity tolerance    10 min Therapeutic Activity:  []  See flow sheet : functional reaching/gripping activities   Rationale: increase ROM, increase strength, improve coordination, improve balance and increase proprioception  to improve the patients ability to hold and carry items     22 min Neuromuscular Re-education:  []  See flow sheet : scapular/ cervical stabilization   Rationale: increase strength, improve coordination, improve balance and increase proprioception  to improve the patients ability to tolerate sustained postures          With   [] TE   [] TA   [] neuro   [] other: Patient Education: [x] Review HEP    [] Progressed/Changed HEP based on:   [] positioning   [] body mechanics   [] transfers   [] heat/ice application    [] other:      Other Objective/Functional Measures: maintained current program     Pain Level (0-10 scale) post treatment: 8    ASSESSMENT/Changes in Function: Patient reports he was sore after last session therefore maintained current program today. He reports decreasing the frequency of HEP performance helped manage his pain/soreness between sessions. Patient tolerated most exercises without increase of pain. He c/o left sided neck pain 8.5/10 following open book stretch to left; pain was slightly reduced with seated cervical retractions. Concluded session with .     Patient will continue to benefit from skilled PT services to modify and progress therapeutic interventions, address functional mobility deficits, address ROM deficits, address strength deficits, analyze and address soft tissue restrictions, analyze and cue movement patterns, analyze and modify body mechanics/ergonomics, assess and modify postural abnormalities, address imbalance/dizziness and instruct in home and community integration to attain remaining goals. []  See Plan of Care  []  See progress note/recertification  []  See Discharge Summary         Progress towards goals / Updated goals:  Short Term Goals: To be accomplished in 2 treatments:  1.  Pt will report compliance with HEP in order to increase therapeutic outcomes.              Eval=Established              MET  Long Term Goals: To be accomplished in 8 treatments:  1. Pt will report max pain 7/10 in order to increase functional activity tolerance.               Eval=9-9.5  2. Pt will report increased functional independence as noted by FOTO score of 61.               Eval=51  3. Pt will demonstrate improved bilateral low trap and right rhomboid strength of 5/5 in order to improve ability to tolerate sustained postures.               Eval=bilateral low trap 3/5, rhomboid 4+/5  4. Pt will demonstrate increased right  strength to 64lbs in order to improve ability to hold and carry items.                Eval=55.6lbs    PLAN  []  Upgrade activities as tolerated     [x]  Continue plan of care  []  Update interventions per flow sheet       []  Discharge due to:_  []  Other:_      Yasmany Cherry PTA 7/7/2021  11:00 AM    Future Appointments   Date Time Provider Joseph Barber   7/7/2021 12:00 PM Joe Bates [facility]   7/9/2021 12:00 PM Iza Kelly, PT [facility]   7/14/2021 12:00 PM Flip Bryant PTA [facility]   7/16/2021 12:00 PM Lucina Lazcano, PT [facility]   7/21/2021 12:00 PM Joe Bates [facility]   7/23/2021 12:00 PM Lucina Lazcano, PT [facility]

## 2021-07-09 ENCOUNTER — APPOINTMENT (OUTPATIENT)
Dept: PHYSICAL THERAPY | Age: 41
End: 2021-07-09
Payer: MEDICAID

## 2021-07-14 ENCOUNTER — HOSPITAL ENCOUNTER (OUTPATIENT)
Dept: PHYSICAL THERAPY | Age: 41
Discharge: HOME | End: 2021-07-14
Payer: MEDICAID

## 2021-07-14 PROCEDURE — 97110 THERAPEUTIC EXERCISES: CPT

## 2021-07-14 PROCEDURE — 97112 NEUROMUSCULAR REEDUCATION: CPT

## 2021-07-14 PROCEDURE — 97530 THERAPEUTIC ACTIVITIES: CPT

## 2021-07-14 NOTE — PROGRESS NOTES
PT DAILY TREATMENT NOTE     Patient Name: Anselmo Zuniga  Date:2021  : 1980  [x]  Patient  Verified  Payor: [facility] MEDICAID / Plan: [facility] [employer] [place] [employer] / Product Type: Managed Care Medicaid /    In time:1201  Out time:1249  Total Treatment Time (min): 48  Visit #: 4 of 8    Treatment Area: Neck pain [M54.2]    SUBJECTIVE  Pain Level (0-10 scale): 4  Any medication changes, allergies to medications, adverse drug reactions, diagnosis change, or new procedure performed?: [x] No    [] Yes (see summary sheet for update)  Subjective functional status/changes:   [] No changes reported  Patient reports he's feeling better than he was on Friday.     OBJECTIVE    Modality rationale: decrease pain and increase tissue extensibility to improve the patients ability to perform ADLs   Min Type Additional Details    [] Estim:  []Unatt       []IFC  []Premod                        []Other:  []w/ice   []w/heat  Position:  Location:    [] Estim: []Att    []TENS instruct  []NMES                    []Other:  []w/US   []w/ice   []w/heat  Position:  Location:    []  Traction: [] Cervical       []Lumbar                       [] Prone          []Supine                       []Intermittent   []Continuous Lbs:  [] before manual  [] after manual    []  Ultrasound: []Continuous   [] Pulsed                           []1MHz   []3MHz W/cm2:  Location:    []  Iontophoresis with dexamethasone         Location: [] Take home patch   [] In clinic   10 []  Ice     [x]  heat  []  Ice massage  []  Laser   []  Anodyne Position: seated  Location: neck    []  Laser with stim  []  Other:  Position:  Location:    []  Vasopneumatic Device    []  Right     []  Left  Pre-treatment girth:  Post-treatment girth:  Measured at (location):  Pressure:       [] lo [] med [] hi   Temperature: [] lo [] med [] hi   [] Skin assessment post-treatment:  []intact []redness- no adverse reaction    []redness  adverse reaction:     10 min Therapeutic Exercise:  [x] See flow sheet :   Rationale: increase ROM and increase strength to improve the patients ability to increase activity tolerance    10 min Therapeutic Activity:  [x]  See flow sheet :  strengthening   Rationale: increase ROM, increase strength and improve coordination  to improve the patients ability to perform functional carrying/lifting     18 min Neuromuscular Re-education:  [x]  See flow sheet : scapular and cervical stabilization   Rationale: increase strength, improve coordination, improve balance and increase proprioception  to improve the patients ability to tolerate sustained postures          With   [] TE   [] TA   [] neuro   [] other: Patient Education: [x] Review HEP    [] Progressed/Changed HEP based on:   [] positioning   [] body mechanics   [] transfers   [] heat/ice application    [] other:      Other Objective/Functional Measures: progressed per flow sheet     Pain Level (0-10 scale) post treatment: 6    ASSESSMENT/Changes in Function: Patient tolerated progression with some exercises today. Maintained reps with cervical exercises as patient reported fatigue after 10 reps. He reported increased difficulty on left vs right with  exercises and body blade exercises. He was able to perform increased reps with open books without pain. He continues to have poor postural awareness and increase of pain following sessions. Patient will continue to benefit from skilled PT services to modify and progress therapeutic interventions, address functional mobility deficits, address ROM deficits, address strength deficits, analyze and address soft tissue restrictions, analyze and cue movement patterns, analyze and modify body mechanics/ergonomics, assess and modify postural abnormalities, address imbalance/dizziness and instruct in home and community integration to attain remaining goals.      []  See Plan of Care  []  See progress note/recertification  []  See Discharge Summary Progress towards goals / Updated goals:  Short Term Goals: To be accomplished in 2 treatments:  1.  Pt will report compliance with HEP in order to increase therapeutic outcomes.              Eval=Established              MET  Long Term Goals: To be accomplished in 8 treatments:  1. Pt will report max pain 7/10 in order to increase functional activity tolerance.               Eval=9-9.5  2. Pt will report increased functional independence as noted by FOTO score of 61.               Eval=51  3. Pt will demonstrate improved bilateral low trap and right rhomboid strength of 5/5 in order to improve ability to tolerate sustained postures.               Eval=bilateral low trap 3/5, rhomboid 4+/5  4. Pt will demonstrate increased right  strength to 64lbs in order to improve ability to hold and carry items.                Eval=55.6lbs    PLAN  []  Upgrade activities as tolerated     [x]  Continue plan of care  []  Update interventions per flow sheet       []  Discharge due to:_  []  Other:_      Ligia Lynn, ABIGAIL 7/14/2021  9:37 AM    Future Appointments   Date Time Provider Joseph Barber   7/14/2021 12:00 PM [facility]   7/16/2021 12:00 PM Esequiel Dickson PT [facility]   7/21/2021 12:00 PM Jackelyn Slice [facility]   7/23/2021 12:00 PM Esequiel Dickson PT [facility]

## 2021-07-16 ENCOUNTER — HOSPITAL ENCOUNTER (OUTPATIENT)
Dept: PHYSICAL THERAPY | Age: 41
Discharge: HOME | End: 2021-07-16
Payer: MEDICAID

## 2021-07-16 PROCEDURE — 97530 THERAPEUTIC ACTIVITIES: CPT

## 2021-07-16 PROCEDURE — 97110 THERAPEUTIC EXERCISES: CPT

## 2021-07-16 PROCEDURE — 97112 NEUROMUSCULAR REEDUCATION: CPT

## 2021-07-16 NOTE — PROGRESS NOTES
PT DAILY TREATMENT NOTE     Patient Name: Jose Shea  Date:2021  : 1980  [x]  Patient  Verified  Payor: [facility] MEDICAID / Plan: [employer] [place] [employer] / Product Type: Managed Care Medicaid /    In time:1200  Out time:1245  Total Treatment Time (min): 45  Visit #: 5 of 8  Treatment Area: Neck pain [M54.2]    SUBJECTIVE  Pain Level (0-10 scale): 5  Any medication changes, allergies to medications, adverse drug reactions, diagnosis change, or new procedure performed?: [x] No    [] Yes (see summary sheet for update)  Subjective functional status/changes:   [] No changes reported  \"Its about the same. \"    OBJECTIVE    Modality rationale: decrease pain and increase tissue extensibility to improve the patients ability to perform ADLs   Min Type Additional Details      []? Estim:  []? Unatt       []? IFC  []? Premod                        []?Other:  []?w/ice   []?w/heat  Position:  Location:      []? Estim: []? Att    []? TENS instruct  []? NMES                    []?Other:  []?w/US   []?w/ice   []?w/heat  Position:  Location:      []? Traction: []? Cervical       []? Lumbar                       []? Prone          []? Supine                       []?Intermittent   []? Continuous Lbs:  []? before manual  []? after manual      []? Ultrasound: []? Continuous   []? Pulsed                           []? 1MHz   []? 3MHz W/cm2:  Location:      []? Iontophoresis with dexamethasone         Location: []? Take home patch   []? In clinic    10 []? Ice     [x]? heat  []? Ice massage  []? Laser   []? Anodyne Position: seated  Location: neck      []? Laser with stim  []? Other:  Position:  Location:      []? Vasopneumatic Device    []? Right     []? Left  Pre-treatment girth:  Post-treatment girth:  Measured at (location):  Pressure:       []? lo []? med []? hi   Temperature: []? lo []? med []? hi    []? Skin assessment post-treatment:  []?intact []? redness- no adverse reaction    []? redness  adverse reaction:      10 min Therapeutic Exercise:  [x]? See flow sheet :   Rationale: increase ROM and increase strength to improve the patients ability to increase activity tolerance     10 min Therapeutic Activity:  [x]? See flow sheet :  strengthening   Rationale: increase ROM, increase strength and improve coordination  to improve the patients ability to perform functional carrying/lifting     15 min Neuromuscular Re-education:  [x]? See flow sheet : scapular and cervical stabilization   Rationale: increase strength, improve coordination, improve balance and increase proprioception  to improve the patients ability to tolerate sustained postures            With   [] TE   [] TA   [] neuro   [] other: Patient Education: [x] Review HEP    [] Progressed/Changed HEP based on:   [] positioning   [] body mechanics   [] transfers   [] heat/ice application    [] other:      Other Objective/Functional Measures:   Functional Gains: fine motor control improving, less arm irritation with resting posturing, less hand numbness  Functional Deficits: typing, dropping objects in hand, burning pain/tingling/numbness, leg buckling  % improvement: 5-8%  Pain   Average: 5-6/10       Best: 4/10     Worst: 9-9.5/10  Patient Goal: \"getting fine motor control back, reduced numbness/tingling/reduce fall risk/prevent recurrence. \"       Pain Level (0-10 scale) post treatment: 5    ASSESSMENT/Changes in Function: see PN    Patient will continue to benefit from skilled PT services to modify and progress therapeutic interventions, address functional mobility deficits, address ROM deficits, address strength deficits, analyze and address soft tissue restrictions, analyze and cue movement patterns, analyze and modify body mechanics/ergonomics, assess and modify postural abnormalities, address imbalance/dizziness and instruct in home and community integration to attain remaining goals.      []  See Plan of Care  [x]  See progress note/recertification  []  See Discharge Summary           Goals: to be achieved in 4 weeks:  1. Pt will report max pain 7/10 in order to increase functional activity tolerance.               PN: no change  2. Pt will report increased functional independence as noted by FOTO score of 61.               PN: No change  3. Pt will demonstrate improved bilateral low trap and right rhomboid strength of 5/5 in order to improve ability to tolerate sustained postures.               PN::no change  4. Pt will demonstrate increased right/left  strength to 80#/70# lbs in order to improve ability to hold and carry items.                PN:73# right, left: 55#   PLAN  []  Upgrade activities as tolerated     [x]  Continue plan of care  []  Update interventions per flow sheet       []  Discharge due to:_  []  Other:_      Analisa Stringer, PT 7/16/2021  12:05 PM    Future Appointments   Date Time Provider Joseph Barber   7/21/2021 12:00 PM Janee Del Rosario [facility]   7/23/2021 12:00 PM Ashwin Álvarez PT [facility]

## 2021-07-16 NOTE — PROGRESS NOTES
[facility] [address], [address] [phone] fax    Physician Update  [x] Progress Note  [] Discharge Summary    Patient name: Yasmin Ricketts Start of Care: 2021   Referral source: Alfnoso Melgar MD : 1980                Medical Diagnosis: Neck pain [M54.2]  Payor: [facility] MEDICAID / Plan: [employer] [place] [employer] / Product Type: Managed Care Medicaid /  Onset Date:May 2020                Treatment Diagnosis: neck pain   Prior Hospitalization: see medical history Provider#: [id number]   Medications: Verified on Patient summary List    Comorbidities: Depression, Arthritis, Tobacco Use, Asthma, Scoliosis, Congential Hip Displaysia   Prior Level of Function: functionally (I), right hand dominant, frequent computer use       Visits from Start of Care: 4    Missed Visits: 0        Short Term Goals: To be accomplished in 2 treatments:  1.  Pt will report compliance with HEP in order to increase therapeutic outcomes.              Eval=Established              MET  Long Term Goals: To be accomplished in 8 treatments:  1. Pt will report max pain 7/10 in order to increase functional activity tolerance.               Eval=9-9.5                NOT MET: no change  2. Pt will report increased functional independence as noted by FOTO score of 61.               Eval=51               NOT MET:  No change  3. Pt will demonstrate improved bilateral low trap and right rhomboid strength of 5/5 in order to improve ability to tolerate sustained postures.               Eval=bilateral low trap 3/5, rhomboid 4+/5              NOT MET:no change  4. Pt will demonstrate increased right  strength to 64lbs in order to improve ability to hold and carry items.                Eval=55.6lbs              MET:73# right, left: 55#     Functional Gains: fine motor control improving, less arm irritation with resting posturing, less hand numbness  Functional Deficits: typing, dropping objects in hand, burning pain/tingling/numbness, leg buckling  % improvement: 5-8%  Pain   Average: 5-6/10                  Best: 4/10                Worst: 9-9.5/10  Patient Goal: \"getting fine motor control back, reduced numbness/tingling/reduce fall risk/prevent recurrence. \"    Goals: to be achieved in 4 weeks:  1. Pt will report max pain 7/10 in order to increase functional activity tolerance.               PN: no change  2. Pt will report increased functional independence as noted by FOTO score of 61.               PN: No change  3. Pt will demonstrate improved bilateral low trap and right rhomboid strength of 5/5 in order to improve ability to tolerate sustained postures.               PN::no change  4. Pt will demonstrate increased right/left  strength to 80#/70# lbs in order to improve ability to hold and carry items.              PN:73# right, left: 55#     ASSESSMENT/RECOMMENDATIONS:  Mild improvements seen so far, limited by only 5 treatment sessions thus far.  Recommend continued PT.   [x]Continue therapy per initial plan/protocol at a frequency of  2 x per week for 4 weeks  []Continue therapy with the following recommended changes:_____________________      _____________________________________________________________________  []Discontinue therapy progressing towards or have reached established goals  []Discontinue therapy due to lack of appreciable progress towards goals  []Discontinue therapy due to lack of attendance or compliance  []Await Physician's recommendations/decisions regarding therapy  []Other:________________________________________________________________    Thank you for this referral.   Sudarshan Herrera, PT 7/16/2021 12:20 PM  NOTE TO PHYSICIAN:  [address] AND   FAX TO [facility]: ([phone]  If you are unable to process this request in 24 hours please contact our office: [phone]    []  I have read the above report and request that my patient continue as recommended. []  I have read the above report and request that my patient continue therapy with the following changes/special instructions:________________________________________  []I have read the above report and request that my patient be discharged from therapy.     [de-identified] Signature:____________Date:_________TIME:________     Mikki Stanford MD  ** Signature, Date and Time must be completed for valid certification **

## 2021-07-21 ENCOUNTER — HOSPITAL ENCOUNTER (OUTPATIENT)
Dept: PHYSICAL THERAPY | Age: 41
Discharge: HOME | End: 2021-07-21
Payer: MEDICAID

## 2021-07-21 PROCEDURE — 97530 THERAPEUTIC ACTIVITIES: CPT

## 2021-07-21 PROCEDURE — 97110 THERAPEUTIC EXERCISES: CPT

## 2021-07-21 PROCEDURE — 97112 NEUROMUSCULAR REEDUCATION: CPT

## 2021-07-21 NOTE — PROGRESS NOTES
PT DAILY TREATMENT NOTE     Patient Name: Raquel Kim  Date:2021  : 1980  [x]  Patient  Verified  Payor: [facility] MEDICAID / Plan: [facility] [employer] [place] [employer] / Product Type: Managed Care Medicaid /    In EHEX:[id number]  Out time:1249  Total Treatment Time (min): 47  Visit #: 1 of 8    Treatment Area: Neck pain [M54.2]    SUBJECTIVE  Pain Level (0-10 scale): 5  Any medication changes, allergies to medications, adverse drug reactions, diagnosis change, or new procedure performed?: [x] No    [] Yes (see summary sheet for update)  Subjective functional status/changes:   [] No changes reported  Patient reports his shoulder and hand are acting up. He's having difficulty with moving with fingers.     OBJECTIVE    Modality rationale: decrease pain and increase tissue extensibility to improve the patients ability to perform ADLs   Min Type Additional Details    [] Estim:  []Unatt       []IFC  []Premod                        []Other:  []w/ice   []w/heat  Position:  Location:    [] Estim: []Att    []TENS instruct  []NMES                    []Other:  []w/US   []w/ice   []w/heat  Position:  Location:    []  Traction: [] Cervical       []Lumbar                       [] Prone          []Supine                       []Intermittent   []Continuous Lbs:  [] before manual  [] after manual    []  Ultrasound: []Continuous   [] Pulsed                           []1MHz   []3MHz W/cm2:  Location:    []  Iontophoresis with dexamethasone         Location: [] Take home patch   [] In clinic   10 []  Ice     [x]  heat  []  Ice massage  []  Laser   []  Anodyne Position: seated  Location: neck    []  Laser with stim  []  Other:  Position:  Location:    []  Vasopneumatic Device    []  Right     []  Left  Pre-treatment girth:  Post-treatment girth:  Measured at (location):  Pressure:       [] lo [] med [] hi   Temperature: [] lo [] med [] hi   [x] Skin assessment post-treatment:  [x]intact []redness- no adverse reaction []redness  adverse reaction:     12 min Therapeutic Exercise:  [x] See flow sheet :   Rationale: increase ROM and increase strength to improve the patients ability to increase activity tolerance    12 min Therapeutic Activity:  [x]  See flow sheet :  strengthening activities   Rationale: increase ROM, increase strength and improve coordination  to improve the patients ability to perform lifting and carrying activities     20 min Neuromuscular Re-education:  [x]  See flow sheet : cervical and scapular stabilization   Rationale: increase strength, improve coordination, improve balance and increase proprioception  to improve the patients ability to tolerate sustained postures          With   [] TE   [] TA   [] neuro   [] other: Patient Education: [x] Review HEP    [] Progressed/Changed HEP based on:   [] positioning   [] body mechanics   [] transfers   [] heat/ice application    [] other:      Other Objective/Functional Measures: increased reps with  strength exercises     Pain Level (0-10 scale) post treatment: 8.5    ASSESSMENT/Changes in Function: Patient reports difficulty with fine motor control of left hand today which he noticed on his way to therapy. He denies any triggering event. He was able to increase reps with  strengthening and cervical stabilization exercises reporting numbness of left hand and scapula following prone exercises. He reported burning pain in left scapula following session. Patient will continue to benefit from skilled PT services to modify and progress therapeutic interventions, address functional mobility deficits, address ROM deficits, address strength deficits, analyze and address soft tissue restrictions, analyze and cue movement patterns, analyze and modify body mechanics/ergonomics, assess and modify postural abnormalities, address imbalance/dizziness and instruct in home and community integration to attain remaining goals.      []  See Plan of Care  []  See progress note/recertification  []  See Discharge Summary         Progress towards goals / Updated goals:  Goals: to be achieved in 4 weeks:  1. Pt will report max pain 7/10 in order to increase functional activity tolerance.               PN: no change  2. Pt will report increased functional independence as noted by FOTO score of 61.               PN: No change  3. Pt will demonstrate improved bilateral low trap and right rhomboid strength of 5/5 in order to improve ability to tolerate sustained postures.               PN::no change  4. Pt will demonstrate increased right/left  strength to 80#/70# lbs in order to improve ability to hold and carry items.                PN:73# right, left: 55#     PLAN  [x]  Upgrade activities as tolerated     [x]  Continue plan of care  []  Update interventions per flow sheet       []  Discharge due to:_  []  Other:_      Dayne Medrano, PTA 7/21/2021  12:04 PM    Future Appointments   Date Time Provider Joseph Barber   7/23/2021 12:00 PM Queta Cornea, PT [facility]   7/28/2021  1:30 PM Cholo Pearson, [facility]   7/30/2021 11:15 AM Cyndi Adams, PT [facility]   8/4/2021 12:00 PM Bolivar Nicole, PTA [facility]   8/6/2021 12:00 PM [facility] PT [address] [facility]   8/11/2021 12:00 PM Queta Cornea, PT [facility]   8/13/2021 12:00 PM [facility]   8/18/2021 12:00 PM Bailey Machado [facility]   8/20/2021 12:00 PM Queta Cornea, PT [facility]

## 2021-07-23 ENCOUNTER — HOSPITAL ENCOUNTER (OUTPATIENT)
Dept: PHYSICAL THERAPY | Age: 41
Discharge: HOME | End: 2021-07-23
Payer: MEDICAID

## 2021-07-23 PROCEDURE — 97530 THERAPEUTIC ACTIVITIES: CPT

## 2021-07-23 PROCEDURE — 97112 NEUROMUSCULAR REEDUCATION: CPT

## 2021-07-23 PROCEDURE — 97110 THERAPEUTIC EXERCISES: CPT

## 2021-07-23 NOTE — PROGRESS NOTES
PT DAILY TREATMENT NOTE     Patient Name: Renetta Zuniga  Date:2021  : 1980  [x]  Patient  Verified  Payor: [facility] MEDICAID / Plan: [employer] [place] [employer] / Product Type: Managed Care Medicaid /    In time:1206 Out time:1250  Total Treatment Time (min): 44  Visit #: 4 of 8      Treatment Area: Neck pain [M54.2]    SUBJECTIVE  Pain Level (0-10 scale): 4  Any medication changes, allergies to medications, adverse drug reactions, diagnosis change, or new procedure performed?: [x] No    [] Yes (see summary sheet for update)  Subjective functional status/changes:   [] No changes reported  \"I'm doing ok today. \"    OBJECTIVE    Modality rationale: decrease pain and increase tissue extensibility to improve the patients ability to perform ADLs   Min Type Additional Details      []? Estim:  []? Unatt       []? IFC  []? Premod                        []?Other:  []?w/ice   []?w/heat  Position:  Location:      []? Estim: []? Att    []? TENS instruct  []? NMES                    []?Other:  []?w/US   []?w/ice   []?w/heat  Position:  Location:      []? Traction: []? Cervical       []? Lumbar                       []? Prone          []? Supine                       []?Intermittent   []? Continuous Lbs:  []? before manual  []? after manual      []? Ultrasound: []? Continuous   []? Pulsed                           []? 1MHz   []? 3MHz W/cm2:  Location:      []? Iontophoresis with dexamethasone         Location: []? Take home patch   []? In clinic    10 []? Ice     [x]? heat  []? Ice massage  []? Laser   []? Anodyne Position: seated  Location: neck      []? Laser with stim  []? Other:  Position:  Location:      []? Vasopneumatic Device    []? Right     []? Left  Pre-treatment girth:  Post-treatment girth:  Measured at (location):  Pressure:       []? lo []? med []? hi   Temperature: []? lo []? med []? hi    [x]? Skin assessment post-treatment:  [x]? intact []? redness- no adverse reaction    []? redness  adverse reaction:      12 min Therapeutic Exercise:  [x]? See flow sheet :   Rationale: increase ROM and increase strength to improve the patients ability to increase activity tolerance     12 min Therapeutic Activity:  [x]? See flow sheet :  strengthening activities   Rationale: increase ROM, increase strength and improve coordination  to improve the patients ability to perform lifting and carrying activities     20 min Neuromuscular Re-education:  [x]? See flow sheet : cervical and scapular stabilization   Rationale: increase strength, improve coordination, improve balance and increase proprioception  to improve the patients ability to tolerate sustained postures       With   [] TE   [] TA   [] neuro   [] other: Patient Education: [x] Review HEP    [] Progressed/Changed HEP based on:   [] positioning   [] body mechanics   [] transfers   [] heat/ice application    [] other:      Other Objective/Functional Measures:      Pain Level (0-10 scale) post treatment: 6.5    ASSESSMENT/Changes in Function: still fatigues with scap exercises but less pain reported at end of session. Able to perform ore prone scap reps. Patient will continue to benefit from skilled PT services to modify and progress therapeutic interventions, address functional mobility deficits, address ROM deficits, address strength deficits, analyze and address soft tissue restrictions, analyze and cue movement patterns, analyze and modify body mechanics/ergonomics, assess and modify postural abnormalities, address imbalance/dizziness and instruct in home and community integration to attain remaining goals.      []  See Plan of Care  []  See progress note/recertification  []  See Discharge Summary         Progress towards goals / Updated goals:  1. Pt will report max pain 7/10 in order to increase functional activity tolerance.               PN: no change  2. Pt will report increased functional independence as noted by FOTO score Lissy.Fillers.               PN: No change  3. Pt will demonstrate improved bilateral low trap and right rhomboid strength of 5/5 in order to improve ability to tolerate sustained postures.               PN::no change  4. Pt will demonstrate increased right/left  strength to 80#/70# lbs in order to improve ability to hold and carry items.                PN:73# right, left: 55#     PLAN  []  Upgrade activities as tolerated     [x]  Continue plan of care  []  Update interventions per flow sheet       []  Discharge due to:_  []  Other:_      Cam Rodriguez, PT 7/23/2021  12:34 PM    Future Appointments   Date Time Provider Joseph Barber   7/28/2021  1:30 PM Pasha Chung [facility]   7/30/2021 11:15 AM Adalid Castañeda PT [facility]   8/4/2021 12:00 PM Jimbo Fish PTA [facility]   8/6/2021 12:00 PM [facility] PT [address] [facility]   8/11/2021 12:00 PM Raysa Westfall PT [facility]   8/13/2021 12:00 PM [facility] PT [address] [facility]   8/18/2021 12:00 PM Pasha Chung [facility]   8/20/2021 12:00 PM Raysa Westfall PT [facility]

## 2021-07-28 ENCOUNTER — HOSPITAL ENCOUNTER (OUTPATIENT)
Dept: PHYSICAL THERAPY | Age: 41
Discharge: HOME | End: 2021-07-28
Payer: MEDICAID

## 2021-07-28 PROCEDURE — 97530 THERAPEUTIC ACTIVITIES: CPT

## 2021-07-28 PROCEDURE — 97110 THERAPEUTIC EXERCISES: CPT

## 2021-07-28 PROCEDURE — 97112 NEUROMUSCULAR REEDUCATION: CPT

## 2021-07-28 NOTE — PROGRESS NOTES
PT DAILY TREATMENT NOTE     Patient Name: Ananya Zuniga  Date:2021  : 1980  [x]  Patient  Verified  Payor: [facility] MEDICAID / Plan: [facility] [employer] [place] [employer] / Product Type: Managed Care Medicaid /    In time:130  Out time:222  Total Treatment Time (min): 52  Visit #: 5 of 8    Treatment Area: Neck pain [M54.2]    SUBJECTIVE  Pain Level (0-10 scale): 5  Any medication changes, allergies to medications, adverse drug reactions, diagnosis change, or new procedure performed?: [x] No    [] Yes (see summary sheet for update)  Subjective functional status/changes:   [] No changes reported  Patient reports he's had a low grade migraine for a few days. Over the weekend he couldn't turn his neck all the way to the right but then it popped and now it feels better.     OBJECTIVE    Modality rationale: decrease pain and increase tissue extensibility to improve the patients ability to perform ADLs   Min Type Additional Details    [] Estim:  []Unatt       []IFC  []Premod                        []Other:  []w/ice   []w/heat  Position:  Location:    [] Estim: []Att    []TENS instruct  []NMES                    []Other:  []w/US   []w/ice   []w/heat  Position:  Location:    []  Traction: [] Cervical       []Lumbar                       [] Prone          []Supine                       []Intermittent   []Continuous Lbs:  [] before manual  [] after manual    []  Ultrasound: []Continuous   [] Pulsed                           []1MHz   []3MHz W/cm2:  Location:    []  Iontophoresis with dexamethasone         Location: [] Take home patch   [] In clinic   10 []  Ice     [x]  heat  []  Ice massage  []  Laser   []  Anodyne Position: seated  Location: neck    []  Laser with stim  []  Other:  Position:  Location:    []  Vasopneumatic Device    []  Right     []  Left  Pre-treatment girth:  Post-treatment girth:  Measured at (location):  Pressure:       [] lo [] med [] hi   Temperature: [] lo [] med [] hi   [x] Skin assessment post-treatment:  [x]intact []redness- no adverse reaction    []redness  adverse reaction:     12 min Therapeutic Exercise:  [x] See flow sheet :   Rationale: increase ROM and increase strength to improve the patients ability to increase activity tolerance    10 min Therapeutic Activity:  [x]  See flow sheet :  strengthening activities   Rationale: increase ROM, increase strength and improve coordination  to improve the patients ability to perform functional lift & carry of items     20 min Neuromuscular Re-education:  [x]  See flow sheet : scapular and cervical stabilization   Rationale: increase strength, improve coordination, improve balance and increase proprioception  to improve the patients ability to tolerate sustained postures          With   [] TE   [] TA   [x] neuro   [] other: Patient Education: [x] Review HEP    [] Progressed/Changed HEP based on:   [x] positioning   [x] body mechanics   [] transfers   [] heat/ice application    [] other:      Other Objective/Functional Measures: progressed to green web for flex, maintained yellow for ext; added supine SA punch     Pain Level (0-10 scale) post treatment: 6    ASSESSMENT/Changes in Function: Patient able to progress some exercises per flow sheet. He fatigued quickly with prone scapular exercises and needed to perform fewer reps. Able to perform SA punch in supine without exacerbation of symptoms. Patient will continue to benefit from skilled PT services to modify and progress therapeutic interventions, address functional mobility deficits, address ROM deficits, address strength deficits, analyze and address soft tissue restrictions, analyze and cue movement patterns, analyze and modify body mechanics/ergonomics, assess and modify postural abnormalities, address imbalance/dizziness and instruct in home and community integration to attain remaining goals.      []  See Plan of Care  []  See progress note/recertification  []  See Discharge Summary Progress towards goals / Updated goals:  1. Pt will report max pain 7/10 in order to increase functional activity tolerance.               PN: no change  2. Pt will report increased functional independence as noted by FOTO score of 61.               PN: No change  3. Pt will demonstrate improved bilateral low trap and right rhomboid strength of 5/5 in order to improve ability to tolerate sustained postures.               PN::no change  4. Pt will demonstrate increased right/left  strength to 80#/70# lbs in order to improve ability to hold and carry items.                PN:73# right, left: 55#     PLAN  [x]  Upgrade activities as tolerated     [x]  Continue plan of care  []  Update interventions per flow sheet       []  Discharge due to:_  []  Other:_      Melodie Jiang PTA 7/28/2021  9:07 AM    Future Appointments   Date Time Provider Joseph Barber   7/28/2021  1:30 PM Aisha Huffman [facility]   7/30/2021 11:15 AM Nick Acosta PT [facility]   8/4/2021 12:00 PM Libby Denton PTA [facility]   8/6/2021 12:00 PM [facility] PT [facility] 1 [facility]   8/11/2021 12:00 PM Mirna Ruiz PT [facility]   8/13/2021 12:00 PM [facility] PT [facility] 1 [facility]   8/18/2021 12:00 PM Aisha Huffman [facility]   8/20/2021 12:00 PM Mirna Ruiz, PT [facility]

## 2021-07-30 ENCOUNTER — APPOINTMENT (OUTPATIENT)
Dept: PHYSICAL THERAPY | Age: 41
End: 2021-07-30
Payer: MEDICAID

## 2021-08-04 ENCOUNTER — HOSPITAL ENCOUNTER (OUTPATIENT)
Dept: PHYSICAL THERAPY | Age: 41
Discharge: HOME | End: 2021-08-04
Payer: MEDICAID

## 2021-08-04 PROCEDURE — 97112 NEUROMUSCULAR REEDUCATION: CPT

## 2021-08-04 PROCEDURE — 97110 THERAPEUTIC EXERCISES: CPT

## 2021-08-04 PROCEDURE — 97530 THERAPEUTIC ACTIVITIES: CPT

## 2021-08-04 NOTE — PROGRESS NOTES
PT DAILY TREATMENT NOTE     Patient Name: Ilya American  Date:2021  : 1980  [x]  Patient  Verified  Payor: Rodriguez Barrientos / Plan: [facility] COMMUNITY PLAN [facility] CCCP / Product Type: Managed Care Medicaid /    In time:1201  Out time:1246  Total Treatment Time (min): 45  Visit #: 4 of 8    Treatment Area: Neck pain [M54.2]    SUBJECTIVE  Pain Level (0-10 scale): 8  Any medication changes, allergies to medications, adverse drug reactions, diagnosis change, or new procedure performed?: [x] No    [] Yes (see summary sheet for update)  Subjective functional status/changes:   [] No changes reported  \"I'm hurting all over today. \"    OBJECTIVE     Modality rationale: decrease pain to improve the patients ability to improve mobility and positional tolerance   Min Type Additional Details    [] Estim:  []Unatt       []IFC  []Premod                        []Other:  []w/ice   []w/heat  Position:  Location:    [] Estim: []Att    []TENS instruct  []NMES                    []Other:  []w/US   []w/ice   []w/heat  Position:  Location:    []  Traction: [] Cervical       []Lumbar                       [] Prone          []Supine                       []Intermittent   []Continuous Lbs:  [] before manual  [] after manual    []  Ultrasound: []Continuous   [] Pulsed                           []1MHz   []3MHz W/cm2:  Location:    []  Iontophoresis with dexamethasone         Location: [] Take home patch   [] In clinic   10 []  Ice     [x]  heat  []  Ice massage  []  Laser   []  Anodyne Position: seated   Location: neck    []  Laser with stim  []  Other:  Position:  Location:    []  Vasopneumatic Device    []  Right     []  Left  Pre-treatment girth:  Post-treatment girth:  Measured at (location):  Pressure:       [] lo [] med [] hi   Temperature: [] lo [] med [] hi   [x] Skin assessment post-treatment:  [x]intact []redness- no adverse reaction    []redness  adverse reaction:     10 min Therapeutic Exercise:  [x] See flow sheet : Rationale: increase ROM and increase strength to improve the patients ability to perform ADLs    10 min Therapeutic Activity:  [x]  See flow sheet :  strengthening activities   Rationale: increase ROM, increase strength, improve coordination, improve balance and increase proprioception  to improve the patients ability to improve mobility and ADL performance     15 min Neuromuscular Re-education:  [x]  See flow sheet : scapular and cervical stabilization   Rationale: increase ROM, increase strength, improve coordination, improve balance and increase proprioception  to improve the patients ability to improve mobility and reaching         With   [x] TE   [x] TA   [x] neuro   [] other: Patient Education: [x] Review HEP    [] Progressed/Changed HEP based on:   [x] positioning   [x] body mechanics   [] transfers   [] heat/ice application    [] other:      Other Objective/Functional Measures:      Pain Level (0-10 scale) post treatment: 8    ASSESSMENT/Changes in Function: Pt reports increased pain today, but states this is due to the weather. He was able to perform most exercises, but still needs frequent cuing to improve scapular mechanics as he fatigues. He needed to decrease repetitions on some exercises secondary to pain levels. Patient will continue to benefit from skilled PT services to modify and progress therapeutic interventions, address functional mobility deficits, address ROM deficits, address strength deficits, analyze and address soft tissue restrictions, analyze and cue movement patterns, analyze and modify body mechanics/ergonomics, assess and modify postural abnormalities, address imbalance/dizziness and instruct in home and community integration to attain remaining goals.      [x]  See Plan of Care  []  See progress note/recertification  []  See Discharge Summary         Progress towards goals / Updated goals:  1. Pt will report max pain 7/10 in order to increase functional activity tolerance.               PN: no change  2. Pt will report increased functional independence as noted by FOTO score of 61.               PN: No change  3. Pt will demonstrate improved bilateral low trap and right rhomboid strength of 5/5 in order to improve ability to tolerate sustained postures.               PN::no change  4. Pt will demonstrate increased right/left  strength to 80#/70# lbs in order to improve ability to hold and carry items.                PN:73# right, left: 55#     PLAN  []  Upgrade activities as tolerated     [x]  Continue plan of care  []  Update interventions per flow sheet       []  Discharge due to:_  []  Other:_      Maitland Headings, PTA, CSCS 8/4/2021  1:38 PM    Future Appointments   Date Time Provider Joseph Barber   8/6/2021 12:00 PM [facility] PT [facility] 1 [facility]   8/11/2021 12:00 PM [facility]   8/13/2021 12:00 PM [facility] PT [facility] 1 [facility]   8/18/2021 12:00 PM Licha Ling [facility]   8/20/2021 12:00 PM [facility]

## 2021-08-06 ENCOUNTER — HOSPITAL ENCOUNTER (OUTPATIENT)
Dept: PHYSICAL THERAPY | Age: 41
Discharge: HOME | End: 2021-08-06
Payer: MEDICAID

## 2021-08-06 PROCEDURE — 97530 THERAPEUTIC ACTIVITIES: CPT

## 2021-08-06 PROCEDURE — 97112 NEUROMUSCULAR REEDUCATION: CPT

## 2021-08-06 PROCEDURE — 97110 THERAPEUTIC EXERCISES: CPT

## 2021-08-06 NOTE — PROGRESS NOTES
PT DAILY TREATMENT NOTE     Patient Name: Zac Neil  Date:2021  : 1980  [x]  Patient  Verified  Payor: [facility] MEDICAID / Plan: [facility] [employer] [place] [employer] / Product Type: Managed Care Medicaid /    In time:1201  Out time:1252  Total Treatment Time (min): 51  Visit #: 5 of 8    Treatment Area: Neck pain [M54.2]    SUBJECTIVE  Pain Level (0-10 scale): 5  Any medication changes, allergies to medications, adverse drug reactions, diagnosis change, or new procedure performed?: [x] No    [] Yes (see summary sheet for update)  Subjective functional status/changes:   [] No changes reported  Patient states he had a nerve conduction study yesterday and reports pain in left shoulder. States he has follow up appt with neurologist on . OBJECTIVE    Modality rationale: decrease edema, decrease inflammation, decrease pain, increase tissue extensibility and increase muscle contraction/control to improve the patients ability to perform ADL's pain free.    Min Type Additional Details    [] Estim:  []Unatt       []IFC  []Premod                        []Other:  []w/ice   []w/heat  Position:  Location:    [] Estim: []Att    []TENS instruct  []NMES                    []Other:  []w/US   []w/ice   []w/heat  Position:  Location:    []  Traction: [] Cervical       []Lumbar                       [] Prone          []Supine                       []Intermittent   []Continuous Lbs:  [] before manual  [] after manual    []  Ultrasound: []Continuous   [] Pulsed                           []1MHz   []3MHz W/cm2:  Location:    []  Iontophoresis with dexamethasone         Location: [] Take home patch   [] In clinic   10 []  Ice     [x]  heat  []  Ice massage  []  Laser   []  Anodyne Position: seated  Location:neck    []  Laser with stim  []  Other:  Position:  Location:    []  Vasopneumatic Device    []  Right     []  Left  Pre-treatment girth:  Post-treatment girth:  Measured at (location):  Pressure:       [] lo [] med [] hi   Temperature: [] lo [] med [] hi   [x] Skin assessment post-treatment:  [x]intact []redness- no adverse reaction    []redness  adverse reaction:     10 min Therapeutic Exercise:  [x] See flow sheet :   Rationale: increase ROM, increase strength, improve coordination, improve balance and increase proprioception to improve the patients ability to perform ADL's pain free. 10 min Therapeutic Activity:  [x]  See flow sheet :  strengthening   Rationale: increase ROM, increase strength, improve coordination, improve balance and increase proprioception  to improve the patients ability to perform ADL's pain free. 21 min Neuromuscular Re-education:  [x]  See flow sheet : scapular recruitment   Rationale: increase ROM, increase strength, improve coordination, improve balance and increase proprioception  to improve the patients ability to perform ADL's pain free. With   [] TE   [] TA   [] neuro   [] other: Patient Education: [x] Review HEP    [] Progressed/Changed HEP based on:   [] positioning   [] body mechanics   [] transfers   [] heat/ice application    [] other:      Other Objective/Functional Measures:      Pain Level (0-10 scale) post treatment: 8    ASSESSMENT/Changes in Function: Patient required VC for proper form and posture during exercise. Patient unable to complete all exercises due to increased pain from nerve conduction study yesterday. Patient will continue to benefit from skilled PT services to modify and progress therapeutic interventions, address functional mobility deficits, address ROM deficits, address strength deficits, analyze and address soft tissue restrictions, analyze and cue movement patterns, analyze and modify body mechanics/ergonomics, assess and modify postural abnormalities, address imbalance/dizziness and instruct in home and community integration to attain remaining goals.      []  See Plan of Care  []  See progress note/recertification  []  See Discharge Summary         Progress towards goals / Updated goals:  1. Pt will report max pain 7/10 in order to increase functional activity tolerance.               PN: no change  2. Pt will report increased functional independence as noted by FOTO score of 61.               PN: No change  3. Pt will demonstrate improved bilateral low trap and right rhomboid strength of 5/5 in order to improve ability to tolerate sustained postures.               PN::no change   Current: not met but progressing 8/6/21  4. Pt will demonstrate increased right/left  strength to 80#/70# lbs in order to improve ability to hold and carry items.                PN:73# right, left: 55#     PLAN  []  Upgrade activities as tolerated     [x]  Continue plan of care  []  Update interventions per flow sheet       []  Discharge due to:_  []  Other:_      Miriam Nolasco, ABIGAIL 8/6/2021  12:04 PM    Future Appointments   Date Time Provider Joseph Barber   8/11/2021 12:00 PM Christian Diane [facility]   8/13/2021 12:00 PM [facility] PT [facility] 1 [facility]   8/18/2021 12:00 PM Christian Diane [facility]   8/20/2021 12:00 PM Serafin Epps, PT [facility]

## 2021-08-11 ENCOUNTER — APPOINTMENT (OUTPATIENT)
Dept: PHYSICAL THERAPY | Age: 41
End: 2021-08-11
Payer: MEDICAID

## 2021-08-13 ENCOUNTER — HOSPITAL ENCOUNTER (OUTPATIENT)
Dept: PHYSICAL THERAPY | Age: 41
Discharge: HOME | End: 2021-08-13
Payer: MEDICAID

## 2021-08-13 PROCEDURE — 97110 THERAPEUTIC EXERCISES: CPT

## 2021-08-13 PROCEDURE — 97112 NEUROMUSCULAR REEDUCATION: CPT

## 2021-08-13 PROCEDURE — 97530 THERAPEUTIC ACTIVITIES: CPT

## 2021-08-13 NOTE — PROGRESS NOTES
PT DAILY TREATMENT NOTE     Patient Name: Chucho Zuniga  Date:2021  : 1980  [x]  Patient  Verified  Payor: [facility] MEDICAID / Plan: [facility] [employer] [place] [employer] / Product Type: Managed Care Medicaid /    In time:1200  Out time:1249  Total Treatment Time (min): 52  Visit #: 6 of 8      Treatment Area: Neck pain [M54.2]    SUBJECTIVE  Pain Level (0-10 scale): 4  Any medication changes, allergies to medications, adverse drug reactions, diagnosis change, or new procedure performed?: [x] No    [] Yes (see summary sheet for update)  Subjective functional status/changes:   [] No changes reported  Patient reports feeling constant pressure along his right shoulder blade. OBJECTIVE    Modality rationale: decrease edema, decrease inflammation, decrease pain, increase tissue extensibility and increase muscle contraction/control to improve the patients ability to perform ADL's pain free.    Min Type Additional Details    [] Estim:  []Unatt       []IFC  []Premod                        []Other:  []w/ice   []w/heat  Position:  Location:    [] Estim: []Att    []TENS instruct  []NMES                    []Other:  []w/US   []w/ice   []w/heat  Position:  Location:    []  Traction: [] Cervical       []Lumbar                       [] Prone          []Supine                       []Intermittent   []Continuous Lbs:  [] before manual  [] after manual    []  Ultrasound: []Continuous   [] Pulsed                           []1MHz   []3MHz W/cm2:  Location:    []  Iontophoresis with dexamethasone         Location: [] Take home patch   [] In clinic   10 []  Ice     [x]  heat  []  Ice massage  []  Laser   []  Anodyne Position:seated  Location:neck    []  Laser with stim  []  Other:  Position:  Location:    []  Vasopneumatic Device    []  Right     []  Left  Pre-treatment girth:  Post-treatment girth:  Measured at (location):  Pressure:       [] lo [] med [] hi   Temperature: [] lo [] med [] hi   [x] Skin assessment post-treatment:  [x]intact []redness- no adverse reaction    []redness  adverse reaction:       15 min Therapeutic Exercise:  [x] See flow sheet :   Rationale: increase ROM, increase strength, improve coordination, improve balance and increase proprioception to improve the patients ability to perform ADL's pain free. 10 min Therapeutic Activity:  [x]  See flow sheet :  strength, postural awareness   Rationale: increase ROM, increase strength, improve coordination, improve balance and increase proprioception  to improve the patients ability to perform ADL's pain free. 14 min Neuromuscular Re-education:  [x]  See flow sheet : scapular re-ed   Rationale: increase ROM, increase strength, improve coordination, improve balance and increase proprioception    to improve the patients ability to perform ADL's pain free. With   [] TE   [] TA   [] neuro   [] other: Patient Education: [x] Review HEP    [] Progressed/Changed HEP based on:   [] positioning   [] body mechanics   [] transfers   [] heat/ice application    [] other:      Other Objective/Functional Measures:      Pain Level (0-10 scale) post treatment: 6    ASSESSMENT/Changes in Function: Increased right shoulder pain noted with exercises this visit. Patient unable to complete 45 seconds of body blade due to pain. Increased right shoulder pain noted with prone scapular exercises. Patient will continue to benefit from skilled PT services to modify and progress therapeutic interventions, address functional mobility deficits, address ROM deficits, address strength deficits, analyze and address soft tissue restrictions, analyze and cue movement patterns, analyze and modify body mechanics/ergonomics, assess and modify postural abnormalities, address imbalance/dizziness and instruct in home and community integration to attain remaining goals.      []  See Plan of Care  []  See progress note/recertification  []  See Discharge Summary         Progress towards goals / Updated goals:  1. Pt will report max pain 7/10 in order to increase functional activity tolerance.               PN: no change   Current: pt reports 8/10 max pain   2. Pt will report increased functional independence as noted by FOTO score of 61.               PN: No change  3. Pt will demonstrate improved bilateral low trap and right rhomboid strength of 5/5 in order to improve ability to tolerate sustained postures.               PN::no change              Current: not met but progressing 8/6/21  4. Pt will demonstrate increased right/left  strength to 80#/70# lbs in order to improve ability to hold and carry items.                PN:73# right, left: 55#     PLAN  []  Upgrade activities as tolerated     [x]  Continue plan of care  []  Update interventions per flow sheet       []  Discharge due to:_  []  Other:_      Bonnie Edmonds, ABIGAIL 8/13/2021  12:01 PM    Future Appointments   Date Time Provider Joseph Barber   8/18/2021 12:00 PM Bailey Machado [facility]   8/20/2021 12:00 PM Queta Brenner, PT [facility]

## 2021-08-18 ENCOUNTER — HOSPITAL ENCOUNTER (OUTPATIENT)
Dept: PHYSICAL THERAPY | Age: 41
Discharge: HOME | End: 2021-08-18
Payer: MEDICAID

## 2021-08-18 PROCEDURE — 97112 NEUROMUSCULAR REEDUCATION: CPT

## 2021-08-18 PROCEDURE — 97110 THERAPEUTIC EXERCISES: CPT

## 2021-08-18 PROCEDURE — 97530 THERAPEUTIC ACTIVITIES: CPT

## 2021-08-18 NOTE — PROGRESS NOTES
PT DAILY TREATMENT NOTE     Patient Name: [employer]  Date:2021  : 1980  [x]  Patient  Verified  Payor: Charline Real / Plan: [facility] COMMUNITY PLAN [place] CCCP / Product Type: Managed Care Medicaid /    In time:1205  Out time:1245  Total Treatment Time (min): 40  Visit #: 7 of 8      Treatment Area: Neck pain [M54.2]    SUBJECTIVE  Pain Level (0-10 scale): 4-5.5  Any medication changes, allergies to medications, adverse drug reactions, diagnosis change, or new procedure performed?: [x] No    [] Yes (see summary sheet for update)  Subjective functional status/changes:   [] No changes reported  Patient reports his neck pain has been fluctuating today. OBJECTIVE    10 min Therapeutic Exercise:  [x] See flow sheet :   Rationale: increase ROM and increase strength to improve the patients ability to increase activity tolerance    20 min Therapeutic Activity:  [x]  See flow sheet : FOTO, goals assessment,  strengthening   Rationale: increase ROM, increase strength and improve coordination  to improve the patients ability to perform functional lifting and carrying     10 min Neuromuscular Re-education:  [x]  See flow sheet : postural re-ed, scap/cervical stabilization   Rationale: increase strength, improve coordination, improve balance and increase proprioception  to improve the patients ability to tolerate sustained postures          With   [] TE   [] TA   [] neuro   [] other: Patient Education: [x] Review HEP    [] Progressed/Changed HEP based on:   [] positioning   [] body mechanics   [] transfers   [] heat/ice application    [] other:      Other Objective/Functional Measures: assessed goals     Pain Level (0-10 scale) post treatment: 6    ASSESSMENT/Changes in Function: Patient reports 25-30% improvement since beginning therapy with noted improvements in pain, numbness, tingling, fine motor control, and cervical ROM.  He continues to experience intermittent numbness/tingling, and difficulty with maintaining his  on objects. His  strength and shoulder strength is improving and he would benefit from continued therapy to address remaining deficits. Patient will continue to benefit from skilled PT services to modify and progress therapeutic interventions, address functional mobility deficits, address ROM deficits, address strength deficits, analyze and address soft tissue restrictions, analyze and cue movement patterns, analyze and modify body mechanics/ergonomics, assess and modify postural abnormalities, address imbalance/dizziness and instruct in home and community integration to attain remaining goals. []  See Plan of Care  []  See progress note/recertification  []  See Discharge Summary         Progress towards goals / Updated goals:  1. Pt will report max pain 7/10 in order to increase functional activity tolerance.               PN: no change              Progressing: pt reports 6.5/10 max pain   2. Pt will report increased functional independence as noted by FOTO score of 61.               PN: No change   No change: 51  3. Pt will demonstrate improved bilateral low trap and right rhomboid strength of 5/5 in order to improve ability to tolerate sustained postures.               PN::no change              Progressing: B LT: 4-/5, B Rhomboids 5-/5  4. Pt will demonstrate increased right/left  strength to 80#/70# lbs in order to improve ability to hold and carry items.                PN:73# right, left: 55#   Progressing: right 78#, left 74#     Functional Gains: motor control, numbness and tingling improved, pain has backed off some, ROM in my neck has improved  Functional Deficits: still dropping items, pain and numbness still present  % improvement: 25-30%  Pain   Average: 4/10       Best: 0/10     Worst: 6.5/10  Patient Goal: \"Continue improvement in the same direction\"      PLAN  [x]  Upgrade activities as tolerated     [x]  Continue plan of care  []  Update interventions per flow sheet       []  Discharge due to:_  []  Other:_      Wayne Villafana, PTA 8/18/2021  10:46 AM    Future Appointments   Date Time Provider Joseph Barber   8/18/2021 12:00 PM Sonny Farooq [facility]   8/20/2021 12:00 PM Bertha Schirmer, PT [facility]

## 2021-08-19 NOTE — PROGRESS NOTES
[facility] [address], [address] [phone] fax    Progress Note  Patient name: Aditya Zuniga Start of Care: 2021   Referral source: Cobb, Darris Canavan, MD : 1980                Medical Diagnosis: Neck pain [M54.2]  Payor: Alina Shafer / Plan: [facility] COMMUNITY PLAN MARQUISE CCCP / Product Type: Managed Care Medicaid /  Onset Date:May 2020                Treatment Diagnosis: neck pain   Prior Hospitalization: see medical history Provider#: [id number]   Medications: Verified on Patient summary List    Comorbidities: Depression, Arthritis, Tobacco Use, Asthma, Scoliosis, Congential Hip Displaysia   Prior Level of Function: functionally (I), right hand dominant, frequent computer use    Visits from Start of Care: 12    Missed Visits: 2    Established Goals:          1. Pt will report max pain 7/10 in order to increase functional activity tolerance.               PN: no change              MET pt reports 6.5/10 max pain   2. Pt will report increased functional independence as noted by FOTO score of 61.               PN: No change              No change: 51  3. Pt will demonstrate improved bilateral low trap and right rhomboid strength of 5/5 in order to improve ability to tolerate sustained postures.               PN::no change              Progressing: B LT: 4-/5, B Rhomboids 5-/5  4. Pt will demonstrate increased right/left  strength to 80#/70# lbs in order to improve ability to hold and carry items.                PN:73# right, left: 55#              Partially MET: right 78#, left 74#     Key Functional Changes:    Functional Gains: motor control, numbness and tingling improved, pain has backed off some, ROM in my neck has improved  Functional Deficits: still dropping items, pain and numbness still present  % improvement: 25-30%  Pain   Average: 4/10                  Best: 0/10                Worst: 6.5/10  Patient Goal: \"Continue improvement in the same direction\"      Updated Goals: to be achieved in 4 weeks:  1. Pt will report increased functional independence as noted by FOTO score of 61.               PN:  No change: 51  2. Pt will demonstrate improved bilateral low trap and right rhomboid strength of 5/5 in order to improve ability to tolerate sustained postures.               PN:Progressing: B LT: 4-/5, B Rhomboids 5-/5  3. Pt will demonstrate increased right  strength to 80lbs in order to improve ability to hold and carry items.              PN: progressing 78#    ASSESSMENT/RECOMMENDATIONS:  Mr. Anahi Sanderson reports 25-30% improvement since beginning therapy with noted improvements in pain, numbness, tingling, fine motor control, and cervical ROM. He continues to experience intermittent numbness/tingling, and difficulty with maintaining his  on objects. His  strength and shoulder strength is improving and he would benefit from continued therapy to address remaining deficits.     [x]Continue therapy per initial plan/protocol at a frequency of  1-2 x per week for 4 weeks  []Continue therapy with the following recommended changes:_____________________      _____________________________________________________________________  []Discontinue therapy progressing towards or have reached established goals  []Discontinue therapy due to lack of appreciable progress towards goals  []Discontinue therapy due to lack of attendance or compliance  []Await Physician's recommendations/decisions regarding therapy  []Other:________________________________________________________________    Thank you for this referral.    Adan Duncan, PT 8/19/2021 1:24 PM  NOTE TO PHYSICIAN:  [address] AND   FAX TO [facility]: ([phone]  If you are unable to process this request in 24 hours please contact our office: [phone]    []  I have read the above report and request that my patient continue as recommended. []  I have read the above report and request that my patient continue therapy with the following changes/special instructions:________________________________________  [] I have read the above report and request that my patient be discharged from therapy.     [de-identified] Signature:____________Date:_________TIME:________     Jairon Sears MD  ** Signature, Date and Time must be completed for valid certification **

## 2021-08-20 ENCOUNTER — HOSPITAL ENCOUNTER (OUTPATIENT)
Dept: PHYSICAL THERAPY | Age: 41
Discharge: HOME | End: 2021-08-20
Payer: MEDICAID

## 2021-08-20 PROCEDURE — 97110 THERAPEUTIC EXERCISES: CPT

## 2021-08-20 PROCEDURE — 97112 NEUROMUSCULAR REEDUCATION: CPT

## 2021-08-20 PROCEDURE — 97530 THERAPEUTIC ACTIVITIES: CPT

## 2021-08-20 NOTE — PROGRESS NOTES
PT DAILY TREATMENT NOTE     Patient Name: Fabio Zuniga  Date:2021  : 1980  [x]  Patient  Verified  Payor: [facility] MEDICAID / Plan: [facility] [employer] COMMUNITY PLAN MARQUISE CCCP / Product Type: Managed Care Medicaid /    In time:1200  Out time:1246  Total Treatment Time (min): 55  Visit #: 1 of 8      Treatment Area: Neck pain [M54.2]    SUBJECTIVE  Pain Level (0-10 scale): 3  Any medication changes, allergies to medications, adverse drug reactions, diagnosis change, or new procedure performed?: [x] No    [] Yes (see summary sheet for update)  Subjective functional status/changes:   [] No changes reported  \"I have a migraine, but my neck is not in much pain today. \"    OBJECTIVE    10 min Therapeutic Exercise:  [x]? See flow sheet :   Rationale: increase ROM and increase strength to improve the patients ability to increase activity tolerance     16 min Therapeutic Activity:  [x]? See flow sheet : FOTO, goals assessment,  strengthening   Rationale: increase ROM, increase strength and improve coordination  to improve the patients ability to perform functional lifting and carrying     10 min Neuromuscular Re-education:  [x]? See flow sheet : postural re-ed, scap/cervical stabilization   Rationale: increase strength, improve coordination, improve balance and increase proprioception  to improve the patients ability to tolerate sustained postures    Modality rationale: decrease pain and increase tissue extensibility to improve the patients ability to improve ease with mobility.     Min Type Additional Details    [] Estim:  []Unatt       []IFC  []Premod                        []Other:  []w/ice   []w/heat  Position:  Location:    [] Estim: []Att    []TENS instruct  []NMES                    []Other:  []w/US   []w/ice   []w/heat  Position:  Location:    []  Traction: [] Cervical       []Lumbar                       [] Prone          []Supine                       []Intermittent   []Continuous Lbs:  [] before manual  [] after manual    []  Ultrasound: []Continuous   [] Pulsed                           []1MHz   []3MHz Location:  W/cm2:    []  Iontophoresis with dexamethasone         Location: [] Take home patch   [] In clinic   10 []  Ice     [x]  heat  []  Ice massage  []  Laser   []  Anodyne Position:seated  Location:neck    []  Laser with stim  []  Other: Position:  Location:    []  Vasopneumatic Device  Pre-treatment girth:  Post-treatment girth:  Measured at (location):  Pressure:       [] lo [] med [] hi   Temperature: [] lo [] med [] hi   [x] Skin assessment post-treatment:  [x]intact []redness- no adverse reaction    []redness  adverse reaction:                                                                              With   [] TE   [] TA   [] neuro   [] other: Patient Education: [x] Review HEP    [] Progressed/Changed HEP based on:   [] positioning   [] body mechanics   [] transfers   [] heat/ice application    [] other:      Other Objective/Functional Measures:      Pain Level (0-10 scale) post treatment: 4-5    ASSESSMENT/Changes in Function: Mr. Claudette End c/o migraine today but was able to perform most activities without exacerbation of symptoms.  strength and thoracic mobility steadily improving. Patient will continue to benefit from skilled PT services to modify and progress therapeutic interventions, address functional mobility deficits, address ROM deficits, address strength deficits, analyze and address soft tissue restrictions, analyze and cue movement patterns, analyze and modify body mechanics/ergonomics, assess and modify postural abnormalities, address imbalance/dizziness and instruct in home and community integration to attain remaining goals.      []  See Plan of Care  []  See progress note/recertification  []  See Discharge Summary         Updated Goals: to be achieved in 4 weeks:  1. Pt will report increased functional independence as noted by FOTO score of 61.               PN:  No change: 51  2. Pt will demonstrate improved bilateral low trap and right rhomboid strength of 5/5 in order to improve ability to tolerate sustained postures.               PN:Progressing: B LT: 4-/5, B Rhomboids 5-/5  3. Pt will demonstrate increased right  strength to 80lbs in order to improve ability to hold and carry items.                PO: progressing 78#    PLAN  []  Upgrade activities as tolerated     [x]  Continue plan of care  []  Update interventions per flow sheet       []  Discharge due to:_  []  Other:_      Damaso Temple, PT 8/20/2021  12:27 PM    Future Appointments   Date Time Provider Joseph Elisabeth   8/27/2021 12:00 PM Gilberto Valentine, PTA [facility]   9/1/2021 12:00 PM Lakesha Che, PTA [facility]   9/3/2021 12:00 PM Gilberto Valentine, PTA [facility]   9/8/2021 12:00 PM Lakesha Che, PTA [facility]   9/9/2021 12:00 PM Olivier Webster, PT [facility]   9/13/2021 12:00 PM Carlitos Silverio [facility]   9/15/2021 12:00 PM Sheree Holstein, PT [facility]

## 2021-08-27 ENCOUNTER — HOSPITAL ENCOUNTER (OUTPATIENT)
Dept: PHYSICAL THERAPY | Age: 41
Discharge: HOME | End: 2021-08-27
Payer: MEDICAID

## 2021-08-27 PROCEDURE — 97530 THERAPEUTIC ACTIVITIES: CPT

## 2021-08-27 PROCEDURE — 97112 NEUROMUSCULAR REEDUCATION: CPT

## 2021-08-27 PROCEDURE — 97110 THERAPEUTIC EXERCISES: CPT

## 2021-08-27 NOTE — PROGRESS NOTES
PT DAILY TREATMENT NOTE     Patient Name: Carmela Zuniga  Date:2021  : 1980  [x]  Patient  Verified  Payor: [facility] MEDICAID / Plan: [facility] COMMUNITY PLAN [place] CCCP / Product Type: Managed Care Medicaid /    In time:1206  Out time:1244  Total Treatment Time (min): 38  Visit #: 2 of 8    Treatment Area: Neck pain [M54.2]    SUBJECTIVE  Pain Level (0-10 scale): 1  Any medication changes, allergies to medications, adverse drug reactions, diagnosis change, or new procedure performed?: [x] No    [] Yes (see summary sheet for update)  Subjective functional status/changes:   [] No changes reported  Patient reports his neck is feeling good today. Just a slight burning behind his shoulder blade. OBJECTIVE    15 min Therapeutic Exercise:  [x] See flow sheet :   Rationale: increase ROM and increase strength to improve the patients ability to increase activity tolerance    8 min Therapeutic Activity:  [x]  See flow sheet :  strengthening and coordination   Rationale: increase ROM, increase strength and improve coordination  to improve the patients ability to  and carry objects     15 min Neuromuscular Re-education:  [x]  See flow sheet : scap and postural re-ed   Rationale: increase strength, improve coordination, improve balance and increase proprioception  to improve the patients ability to tolerate sustained postures          With   [] TE   [] TA   [] neuro   [] other: Patient Education: [x] Review HEP    [] Progressed/Changed HEP based on:   [] positioning   [] body mechanics   [] transfers   [] heat/ice application    [] other:      Other Objective/Functional Measures:      Pain Level (0-10 scale) post treatment: 2    ASSESSMENT/Changes in Function: Patient had very mild pain in his neck today and reports just a slight burning behind the left shoulder. Symptoms increased slightly with exercises but he tolerated well and declined MHP following session.  He sees his neurologist for a f/u on the 10th.    Patient will continue to benefit from skilled PT services to modify and progress therapeutic interventions, address functional mobility deficits, address ROM deficits, address strength deficits, analyze and address soft tissue restrictions, analyze and cue movement patterns, analyze and modify body mechanics/ergonomics, assess and modify postural abnormalities, address imbalance/dizziness and instruct in home and community integration to attain remaining goals. []  See Plan of Care  []  See progress note/recertification  []  See Discharge Summary         Progress towards goals / Updated goals:   Updated Goals: to be achieved in 4 weeks:  1. Pt will report increased functional independence as noted by FOTO score of 61.               PN:  No change: 51  2. Pt will demonstrate improved bilateral low trap and right rhomboid strength of 5/5 in order to improve ability to tolerate sustained postures.               PN:Progressing: B LT: 4-/5, B Rhomboids 5-/5  3. Pt will demonstrate increased right  strength to 80lbs in order to improve ability to hold and carry items.                SE: progressing 78#     PLAN  [x]  Upgrade activities as tolerated     []  Continue plan of care  []  Update interventions per flow sheet       []  Discharge due to:_   []  Other:_      Ej Ritter, PTA 8/27/2021  11:55 AM    Future Appointments   Date Time Provider Joseph Barber   8/27/2021 12:00 PM Wendie Wilkes PTA [facility]   9/1/2021 12:00 PM Diogo Martinez, PTA [facility]   9/3/2021 12:00 PM Wendie Wilkes PTA [facility]   9/8/2021 12:00 PM Diogo Martinez, [facility]   9/9/2021 12:00 PM Christen Fitzgerald PT [facility]   9/13/2021 12:00 PM Kamla Guajardo [facility]   9/15/2021 12:00 PM Brittny Dupont PT [facility]

## 2021-09-01 ENCOUNTER — HOSPITAL ENCOUNTER (OUTPATIENT)
Dept: PHYSICAL THERAPY | Age: 41
Discharge: HOME | End: 2021-09-01
Payer: MEDICAID

## 2021-09-01 PROCEDURE — 97110 THERAPEUTIC EXERCISES: CPT

## 2021-09-01 PROCEDURE — 97530 THERAPEUTIC ACTIVITIES: CPT

## 2021-09-01 PROCEDURE — 97112 NEUROMUSCULAR REEDUCATION: CPT

## 2021-09-01 NOTE — PROGRESS NOTES
PT DAILY TREATMENT NOTE     Patient Name: Shireen Hager  Date:2021  : 1980  [x]  Patient  Verified  Payor: Scotty Jacobs / Plan: [facility] COMMUNITY PLAN [facility] CCCP / Product Type: Managed Care Medicaid /    In time:1200  Out time:1243  Total Treatment Time (min): 43  Visit #: 3 of 8    Treatment Area: Neck pain [M54.2]    SUBJECTIVE  Pain Level (0-10 scale): 3  Any medication changes, allergies to medications, adverse drug reactions, diagnosis change, or new procedure performed?: [x] No    [] Yes (see summary sheet for update)  Subjective functional status/changes:   [] No changes reported  \"I'm feeling pretty good. \"    OBJECTIVE    Modality rationale: decrease pain to improve the patients ability to improve mobility and positional tolerance   Min Type Additional Details    [] Estim:  []Unatt       []IFC  []Premod                        []Other:  []w/ice   []w/heat  Position:  Location:    [] Estim: []Att    []TENS instruct  []NMES                    []Other:  []w/US   []w/ice   []w/heat  Position:  Location:    []  Traction: [] Cervical       []Lumbar                       [] Prone          []Supine                       []Intermittent   []Continuous Lbs:  [] before manual  [] after manual    []  Ultrasound: []Continuous   [] Pulsed                           []1MHz   []3MHz W/cm2:  Location:    []  Iontophoresis with dexamethasone         Location: [] Take home patch   [] In clinic   10 []  Ice     [x]  heat  []  Ice massage  []  Laser   []  Anodyne Position: seated   Location: neck    []  Laser with stim  []  Other:  Position:  Location:    []  Vasopneumatic Device    []  Right     []  Left  Pre-treatment girth:  Post-treatment girth:  Measured at (location):  Pressure:       [] lo [] med [] hi   Temperature: [] lo [] med [] hi   [x] Skin assessment post-treatment:  [x]intact []redness- no adverse reaction    []redness  adverse reaction:     10 min Therapeutic Exercise:  [x] See flow sheet : Rationale: increase ROM and increase strength to improve the patients ability to perform ADLs    13 min Therapeutic Activity:  [x]  See flow sheet :  strengthening and coordination   Rationale: increase ROM, increase strength, improve coordination, improve balance and increase proprioception  to improve the patients ability to improve mobility and upright posture     10 min Neuromuscular Re-education:  [x]  See flow sheet : scap and postural re-ed   Rationale: increase ROM, increase strength, improve coordination, improve balance and increase proprioception  to improve the patients ability to improve mobility and reaching         With   [x] TE   [x] TA   [x] neuro   [] other: Patient Education: [x] Review HEP    [] Progressed/Changed HEP based on:   [x] positioning   [x] body mechanics   [] transfers   [] heat/ice application    [] other:      Other Objective/Functional Measures:      Pain Level (0-10 scale) post treatment: 6    ASSESSMENT/Changes in Function: Pt continues to have radicular sx into left hand restricting exercises and ADL completion. Limited with body blade and prone scap exercises. Patient will continue to benefit from skilled PT services to modify and progress therapeutic interventions, address functional mobility deficits, address ROM deficits, address strength deficits, analyze and address soft tissue restrictions, analyze and cue movement patterns, analyze and modify body mechanics/ergonomics, assess and modify postural abnormalities, address imbalance/dizziness and instruct in home and community integration to attain remaining goals.      [x]  See Plan of Care  []  See progress note/recertification  []  See Discharge Summary         Progress towards goals / Updated goals:   Updated Goals: to be achieved in 4 weeks:  1. Pt will report increased functional independence as noted by FOTO score of 61.               PN:  No change: 51   Assess at 30 day bonita  2. Pt will demonstrate improved bilateral low trap and right rhomboid strength of 5/5 in order to improve ability to tolerate sustained postures.               PN:Progressing: B LT: 4-/5, B Rhomboids 5-/5   Slow progress noted  3. Pt will demonstrate increased right  strength to 80lbs in order to improve ability to hold and carry items.                PN: progressing 78#    No change to note    PLAN  []  Upgrade activities as tolerated     [x]  Continue plan of care  []  Update interventions per flow sheet       []  Discharge due to:_  []  Other:_      Jairon Stanton PTA, CSCS 9/1/2021  12:44 PM    Future Appointments   Date Time Provider Joseph Barber   9[facility]1/2021 12:00 PM [facility]   9/3/2021 12:00 PM [facility]   9/8/2021 12:00 PM [facility]   9/9/2021 12:00 PM [facility]   9/13/2021 12:00 PM [facility]   9/15/2021 12:00 PM [facility] Pearl[facility]

## 2021-09-03 ENCOUNTER — HOSPITAL ENCOUNTER (OUTPATIENT)
Dept: PHYSICAL THERAPY | Age: 41
Discharge: HOME | End: 2021-09-03
Payer: MEDICAID

## 2021-09-03 PROCEDURE — 97530 THERAPEUTIC ACTIVITIES: CPT

## 2021-09-03 PROCEDURE — 97110 THERAPEUTIC EXERCISES: CPT

## 2021-09-03 PROCEDURE — 97112 NEUROMUSCULAR REEDUCATION: CPT

## 2021-09-03 NOTE — PROGRESS NOTES
PT DAILY TREATMENT NOTE     Patient Name: Zac Neil  Date:9/3/2021  : 1980  [x]  Patient  Verified  Payor: [facility] MEDICAID / Plan: [facility] [employer] [place] [employer] / Product Type: Managed Care Medicaid /    In NIBF:[id number]  Out time:1250  Total Treatment Time (min): 48  Visit #: 4 of 8      Treatment Area: Neck pain [M54.2]    SUBJECTIVE  Pain Level (0-10 scale): 5  Any medication changes, allergies to medications, adverse drug reactions, diagnosis change, or new procedure performed?: [x] No    [] Yes (see summary sheet for update)  Subjective functional status/changes:   [] No changes reported  Patient reports increased overall pain and increased neck pain after spending several hours in the car the day before yesterday.     OBJECTIVE    Modality rationale: decrease pain and increase tissue extensibility to improve the patients ability to perform ADLs   Min Type Additional Details    [] Estim:  []Unatt       []IFC  []Premod                        []Other:  []w/ice   []w/heat  Position:  Location:    [] Estim: []Att    []TENS instruct  []NMES                    []Other:  []w/US   []w/ice   []w/heat  Position:  Location:    []  Traction: [] Cervical       []Lumbar                       [] Prone          []Supine                       []Intermittent   []Continuous Lbs:  [] before manual  [] after manual    []  Ultrasound: []Continuous   [] Pulsed                           []1MHz   []3MHz W/cm2:  Location:    []  Iontophoresis with dexamethasone         Location: [] Take home patch   [] In clinic   10 []  Ice     [x]  heat  []  Ice massage  []  Laser   []  Anodyne Position: seated  Location: neck    []  Laser with stim  []  Other:  Position:  Location:    []  Vasopneumatic Device    []  Right     []  Left  Pre-treatment girth:  Post-treatment girth:  Measured at (location):  Pressure:       [] lo [] med [] hi   Temperature: [] lo [] med [] hi   [x] Skin assessment post-treatment:  [x]intact []redness- no adverse reaction    []redness  adverse reaction:       13 min Therapeutic Exercise:  [x] See flow sheet :   Rationale: increase ROM and increase strength to improve the patients ability to increase activity tolerance    10 min Therapeutic Activity:  [x]  See flow sheet :  strengthening activities   Rationale: increase ROM, increase strength and improve coordination  to improve the patients ability to perform functional carrying tasks     15 min Neuromuscular Re-education:  [x]  See flow sheet : scap/postural re-ed   Rationale: increase strength, improve coordination, improve balance and increase proprioception  to improve the patients ability to tolerate sustained postures        With   [] TE   [] TA   [] neuro   [] other: Patient Education: [x] Review HEP    [] Progressed/Changed HEP based on:   [] positioning   [] body mechanics   [] transfers   [] heat/ice application    [] other:      Other Objective/Functional Measures: added ulnar nerve glide and LS stretch     Pain Level (0-10 scale) post treatment:4.5    ASSESSMENT/Changes in Function: Patient reported tingling down the back of his left arm and pain at the spine of his scapula. Symptoms improved with ulnar nerve glide and LS stretch. Increase resistance with prone exercises as tolerated NV. Patient will continue to benefit from skilled PT services to modify and progress therapeutic interventions, address functional mobility deficits, address ROM deficits, address strength deficits, analyze and address soft tissue restrictions, analyze and cue movement patterns, analyze and modify body mechanics/ergonomics, assess and modify postural abnormalities, address imbalance/dizziness and instruct in home and community integration to attain remaining goals.      []  See Plan of Care  []  See progress note/recertification  []  See Discharge Summary         Progress towards goals / Updated goals:   Updated Goals: to be achieved in 4 weeks:  1. Pt will report increased functional independence as noted by FOTO score of 61.               PN:  No change: 51              Assess at 30 day bonita  2. Pt will demonstrate improved bilateral low trap and right rhomboid strength of 5/5 in order to improve ability to tolerate sustained postures.               PN:Progressing: B LT: 4-/5, B Rhomboids 5-/5              Slow progress noted  3. Pt will demonstrate increased right  strength to 80lbs in order to improve ability to hold and carry items.                PN: progressing 78#               No change to note    PLAN  [x]  Upgrade activities as tolerated     []  Continue plan of care  []  Update interventions per flow sheet       []  Discharge due to:_  []  Other:_      [place] Foot, PTA 9/3/2021  10:56 AM    Future Appointments   Date Time Provider Joseph Barber   9/3/2021 12:00 PM [facility] [address]   9/8/2021 12:00 PM [facility]f Ferr[facility] [address]   9/9/2021 12:00 PM [facility] [address]   9/13/2021 12:00 PM Javier Sanchez [facility] [address]   9/15/2021 12:00 PM [facility]ordero[facility] [address]

## 2021-09-08 ENCOUNTER — HOSPITAL ENCOUNTER (OUTPATIENT)
Dept: PHYSICAL THERAPY | Age: 41
Discharge: HOME | End: 2021-09-08
Payer: MEDICAID

## 2021-09-08 PROCEDURE — 97110 THERAPEUTIC EXERCISES: CPT

## 2021-09-08 PROCEDURE — 97112 NEUROMUSCULAR REEDUCATION: CPT

## 2021-09-08 PROCEDURE — 97530 THERAPEUTIC ACTIVITIES: CPT

## 2021-09-08 NOTE — PROGRESS NOTES
PT DAILY TREATMENT NOTE     Patient Name: Matt Lam  Date:2021  : 1980  [x]  Patient  Verified  Payor: Glenys Lyon / Plan: [facility] COMMUNITY PLAN [place] CCCP / Product Type: Managed Care Medicaid /    In time:1205  Out time:1247  Total Treatment Time (min): 42  Visit #: 5 of 8    Treatment Area: Neck pain [M54.2]    SUBJECTIVE  Pain Level (0-10 scale): 2  Any medication changes, allergies to medications, adverse drug reactions, diagnosis change, or new procedure performed?: [x] No    [] Yes (see summary sheet for update)  Subjective functional status/changes:   [] No changes reported  \"My neck isn't too bad today, but I still have numbness down my arm. \"    OBJECTIVE    Modality rationale: decrease pain to improve the patients ability to improve mobility and positional tolerance   Min Type Additional Details    [] Estim:  []Unatt       []IFC  []Premod                        []Other:  []w/ice   []w/heat  Position:  Location:    [] Estim: []Att    []TENS instruct  []NMES                    []Other:  []w/US   []w/ice   []w/heat  Position:  Location:    []  Traction: [] Cervical       []Lumbar                       [] Prone          []Supine                       []Intermittent   []Continuous Lbs:  [] before manual  [] after manual    []  Ultrasound: []Continuous   [] Pulsed                           []1MHz   []3MHz W/cm2:  Location:    []  Iontophoresis with dexamethasone         Location: [] Take home patch   [] In clinic   10 []  Ice     [x]  heat  []  Ice massage  []  Laser   []  Anodyne Position: seated  Location: neck    []  Laser with stim  []  Other:  Position:  Location:    []  Vasopneumatic Device    []  Right     []  Left  Pre-treatment girth:  Post-treatment girth:  Measured at (location):  Pressure:       [] lo [] med [] hi   Temperature: [] lo [] med [] hi   [x] Skin assessment post-treatment:  [x]intact []redness- no adverse reaction    []redness  adverse reaction:     10 min Therapeutic Exercise:  [x] See flow sheet :   Rationale: increase ROM and increase strength to improve the patients ability to perform ADLs    12 min Therapeutic Activity:  [x]  See flow sheet :  strengthening and coordination   Rationale: increase ROM, increase strength, improve coordination, improve balance and increase proprioception  to improve the patients ability to improve mobility and ADL performance     10 min Neuromuscular Re-education:  [x]  See flow sheet : postural re-ed activities    Rationale: increase ROM, increase strength, improve coordination, improve balance and increase proprioception  to improve the patients ability to improve mobility and ADL performance         With   [x] TE   [x] TA   [x] neuro   [] other: Patient Education: [x] Review HEP    [] Progressed/Changed HEP based on:   [x] positioning   [x] body mechanics   [] transfers   [] heat/ice application    [] other:      Other Objective/Functional Measures:      Pain Level (0-10 scale) post treatment: 3.5    ASSESSMENT/Changes in Function: Pt reports a decrease in neck pain upon entry, but continues to have radicular sx into his left UE. Pt arrived today with a new script to add bilateral carpal tunnel. Patient will continue to benefit from skilled PT services to modify and progress therapeutic interventions, address functional mobility deficits, address ROM deficits, address strength deficits, analyze and address soft tissue restrictions, analyze and cue movement patterns, analyze and modify body mechanics/ergonomics, assess and modify postural abnormalities, address imbalance/dizziness and instruct in home and community integration to attain remaining goals.      [x]  See Plan of Care  []  See progress note/recertification  []  See Discharge Summary         Progress towards goals / Updated goals:   Updated Goals: to be achieved in 4 weeks:  1. Pt will report increased functional independence as noted by FOTO score of 61.               PN:  No change: 51              Assess at 30 day bonita  2. Pt will demonstrate improved bilateral low trap and right rhomboid strength of 5/5 in order to improve ability to tolerate sustained postures.               PN:Progressing: B LT: 4-/5, B Rhomboids 5-/5              Slow progress noted  3. Pt will demonstrate increased right  strength to 80lbs in order to improve ability to hold and carry items.                PN: progressing 78#               No change to note    PLAN  []  Upgrade activities as tolerated     [x]  Continue plan of care  []  Update interventions per flow sheet       []  Discharge due to:_  []  Other:_      Lelo Marking, PTA, CSCS 9/8/2021  12:47 PM    Future Appointments   Date Time Provider Joseph Barber   9/9/2021 12:00 PM Tosha Diggs [facility]   9/13/2021 12:00 [facility]   9/15/2021 12:00 PM Luz Calzada, PT [facility]

## 2021-09-09 ENCOUNTER — HOSPITAL ENCOUNTER (OUTPATIENT)
Dept: PHYSICAL THERAPY | Age: 41
Discharge: HOME | End: 2021-09-09
Payer: MEDICAID

## 2021-09-09 PROCEDURE — 97112 NEUROMUSCULAR REEDUCATION: CPT

## 2021-09-09 PROCEDURE — 97530 THERAPEUTIC ACTIVITIES: CPT

## 2021-09-09 PROCEDURE — 97110 THERAPEUTIC EXERCISES: CPT

## 2021-09-09 NOTE — PROGRESS NOTES
PT DAILY TREATMENT NOTE     Patient Name: Frances Rodríguez  Date:2021  : 1980  [x]  Patient  Verified  Payor: [facility] MEDICAID / Plan: [facility] [employer] [place] [employer] / Product Type: Managed Care Medicaid /    In CXFE:[id number]  Out time:1248  Total Treatment Time (min): 49  Visit #: 6 of 8    Treatment Area: Neck pain [M54.2]    SUBJECTIVE  Pain Level (0-10 scale): 0  Any medication changes, allergies to medications, adverse drug reactions, diagnosis change, or new procedure performed?: [x] No    [] Yes (see summary sheet for update)  Subjective functional status/changes:   [] No changes reported  \"I'm actually not having any pain today, but that can change\"    OBJECTIVE    Modality rationale: decrease edema, decrease inflammation, decrease pain, increase tissue extensibility and increase muscle contraction/control to improve the patients ability to perform ADL's pain free.    Min Type Additional Details    [] Estim:  []Unatt       []IFC  []Premod                        []Other:  []w/ice   []w/heat  Position:  Location:    [] Estim: []Att    []TENS instruct  []NMES                    []Other:  []w/US   []w/ice   []w/heat  Position:  Location:    []  Traction: [] Cervical       []Lumbar                       [] Prone          []Supine                       []Intermittent   []Continuous Lbs:  [] before manual  [] after manual    []  Ultrasound: []Continuous   [] Pulsed                           []1MHz   []3MHz W/cm2:  Location:    []  Iontophoresis with dexamethasone         Location: [] Take home patch   [] In clinic   10 []  Ice     [x]  heat  []  Ice massage  []  Laser   []  Anodyne Position:seated  Location:neck    []  Laser with stim  []  Other:  Position:  Location:    []  Vasopneumatic Device    []  Right     []  Left  Pre-treatment girth:  Post-treatment girth:  Measured at (location):  Pressure:       [] lo [] med [] hi   Temperature: [] lo [] med [] hi   [x] Skin assessment post-treatment: [x]intact []redness- no adverse reaction    []redness  adverse reaction:       10 min Therapeutic Exercise:  [x] See flow sheet :   Rationale: increase ROM, increase strength, improve coordination, improve balance and increase proprioception to improve the patients ability to perform ADL's pain free. 19 min Therapeutic Activity:  [x]  See flow sheet :  strengthening   Rationale: increase ROM, increase strength, improve coordination, improve balance and increase proprioception  to improve the patients ability to perform ADL's pain free     10 min Neuromuscular Re-education:  [x]  See flow sheet : postural re-ed, nerve glides   Rationale: increase ROM, increase strength, improve coordination, improve balance and increase proprioception  to improve the patients ability to perform ADL's pain free. With   [] TE   [] TA   [] neuro   [] other: Patient Education: [x] Review HEP    [] Progressed/Changed HEP based on:   [] positioning   [] body mechanics   [] transfers   [] heat/ice application    [] other:      Other Objective/Functional Measures:      Pain Level (0-10 scale) post treatment: 4.5    ASSESSMENT/Changes in Function: Patient reports slight increase in neck pain with bodyblade and open book exercises. No radicular symptoms noted this treatment session. Patient will continue to benefit from skilled PT services to modify and progress therapeutic interventions, address functional mobility deficits, address ROM deficits, address strength deficits, analyze and address soft tissue restrictions, analyze and cue movement patterns, analyze and modify body mechanics/ergonomics, assess and modify postural abnormalities, address imbalance/dizziness and instruct in home and community integration to attain remaining goals.      []  See Plan of Care  []  See progress note/recertification  []  See Discharge Summary         Progress towards goals / Updated goals:  Updated Goals: to be achieved in 4 weeks:  1. Pt will report increased functional independence as noted by FOTO score of 61.               PN:  No change: 51              Assess at 30 day bonita  2. Pt will demonstrate improved bilateral low trap and right rhomboid strength of 5/5 in order to improve ability to tolerate sustained postures.               PN:Progressing: B LT: 4-/5, B Rhomboids 5-/5              Slow progress noted  3. Pt will demonstrate increased right  strength to 80lbs in order to improve ability to hold and carry items.                PN: progressing 78#               No change to note    PLAN  []  Upgrade activities as tolerated     [x]  Continue plan of care  []  Update interventions per flow sheet       []  Discharge due to:_  []  Other:_      Adrianne Springer, ABIGAIL 9/9/2021  12:01 PM    Future Appointments   Date Time Provider Joseph Barber   9[facility]13[facility]2021 12:00 [facility]   9/15/2021 12:00 PM [facility]

## 2021-09-13 ENCOUNTER — HOSPITAL ENCOUNTER (OUTPATIENT)
Dept: PHYSICAL THERAPY | Age: 41
Discharge: HOME | End: 2021-09-13
Payer: MEDICAID

## 2021-09-13 PROCEDURE — 97112 NEUROMUSCULAR REEDUCATION: CPT

## 2021-09-13 PROCEDURE — 97530 THERAPEUTIC ACTIVITIES: CPT

## 2021-09-13 PROCEDURE — 97110 THERAPEUTIC EXERCISES: CPT

## 2021-09-13 PROCEDURE — 97012 MECHANICAL TRACTION THERAPY: CPT

## 2021-09-13 NOTE — PROGRESS NOTES
PT DAILY TREATMENT NOTE     Patient Name: Ronnie Rubalcava  Date:2021  : 1980  [x]  Patient  Verified  Payor: [facility] MEDICAID / Plan: [facility] [place] [employer] / Product Type: Managed Care Medicaid /    In time:1201  Out time:1259  Total Treatment Time (min): 62  Visit #: 7 of 8      Treatment Area: Neck pain [M54.2]    SUBJECTIVE  Pain Level (0-10 scale): 4-5  Any medication changes, allergies to medications, adverse drug reactions, diagnosis change, or new procedure performed?: [x] No    [] Yes (see summary sheet for update)  Subjective functional status/changes:   [] No changes reported  Patient reports pain at the base of his neck and behind his left shoulder blade.     OBJECTIVE    Modality rationale: decrease pain and increase tissue extensibility to improve the patients ability to perform ADLs   Min Type Additional Details    [] Estim:  []Unatt       []IFC  []Premod                        []Other:  []w/ice   []w/heat  Position:  Location:    [] Estim: []Att    []TENS instruct  []NMES                    []Other:  []w/US   []w/ice   []w/heat  Position:  Location:   10 [x]  Traction: [x] Cervical       []Lumbar                       [] Prone          [x]Supine                       [x]Intermittent   []Continuous Lbs: 15/8  [] before manual  [] after manual    []  Ultrasound: []Continuous   [] Pulsed                           []1MHz   []3MHz W/cm2:  Location:    []  Iontophoresis with dexamethasone         Location: [] Take home patch   [] In clinic    []  Ice     []  heat  []  Ice massage  []  Laser   []  Anodyne Position:  Location:    []  Laser with stim  []  Other:  Position:  Location:    []  Vasopneumatic Device    []  Right     []  Left  Pre-treatment girth:  Post-treatment girth:  Measured at (location):  Pressure:       [] lo [] med [] hi   Temperature: [] lo [] med [] hi   [x] Skin assessment post-treatment:  [x]intact []redness- no adverse reaction    []redness  adverse reaction: 20 min Therapeutic Exercise:  [x] See flow sheet :   Rationale: increase ROM and increase strength to improve the patients ability to increase activity tolerance    12 min Therapeutic Activity:  [x]  See flow sheet :  strengthening activities   Rationale: increase ROM, increase strength and improve coordination  to improve the patients ability to perform functional lifting and carrying     16 min Neuromuscular Re-education:  [x]  See flow sheet : scapular stabilization, nerve flossing   Rationale: increase strength, improve coordination, improve balance and increase proprioception  to improve the patients ability to tolerate sustained postures          With   [] TE   [] TA   [] neuro   [] other: Patient Education: [x] Review HEP    [] Progressed/Changed HEP based on:   [] positioning   [] body mechanics   [] transfers   [] heat/ice application    [] other:      Other Objective/Functional Measures: initiated mechanical traction     Pain Level (0-10 scale) post treatment: 3    ASSESSMENT/Changes in Function: Discussed trying mechanical traction to assess effect on B hand symptoms before discharging current order to see patient for carpal tunnel order. Patient agreed and responded well to initial trial reporting reduction in neck pain post session. He is due for reassessment at . Madiha Han 144. Patient will continue to benefit from skilled PT services to modify and progress therapeutic interventions, address functional mobility deficits, address ROM deficits, address strength deficits, analyze and address soft tissue restrictions, analyze and cue movement patterns, analyze and modify body mechanics/ergonomics, assess and modify postural abnormalities, address imbalance/dizziness and instruct in home and community integration to attain remaining goals.      []  See Plan of Care  []  See progress note/recertification  []  See Discharge Summary         Progress towards goals / Updated goals:  Updated Goals: to be achieved in 4 weeks:  1. Pt will report increased functional independence as noted by FOTO score of 61.               PN:  No change: 51              Assess at 30 day bonita  2. Pt will demonstrate improved bilateral low trap and right rhomboid strength of 5/5 in order to improve ability to tolerate sustained postures.               PN:Progressing: B LT: 4-/5, B Rhomboids 5-/5              Slow progress noted  3. Pt will demonstrate increased right  strength to 80lbs in order to improve ability to hold and carry items.                PN: progressing 78#               No change to note    PLAN  []  Upgrade activities as tolerated     [x]  Continue plan of care  []  Update interventions per flow sheet       []  Discharge due to:_  []  Other:_      Dayne Medrano PTA 9/13/2021  12:09 PM    Future Appointments   Date Time Provider Joseph Barber   9/15/2021 12:00 PM Queta Cornea, PT [facility]

## 2021-09-15 ENCOUNTER — HOSPITAL ENCOUNTER (OUTPATIENT)
Dept: PHYSICAL THERAPY | Age: 41
Discharge: HOME | End: 2021-09-15
Payer: MEDICAID

## 2021-09-15 PROCEDURE — 97110 THERAPEUTIC EXERCISES: CPT

## 2021-09-15 PROCEDURE — 97530 THERAPEUTIC ACTIVITIES: CPT

## 2021-09-15 PROCEDURE — 97012 MECHANICAL TRACTION THERAPY: CPT

## 2021-09-15 NOTE — PROGRESS NOTES
PT DAILY TREATMENT NOTE     Patient Name: Ananya Zuniga  Date:9/15/2021  : 1980  [x]  Patient  Verified  Payor: [facility] MEDICAID / Plan: [facility] [employer] [place] [employer] / Product Type: Managed Care Medicaid /    In time:1200  Out time:1245  Total Treatment Time (min): 45  Visit #: 8 of 8    Treatment Area: Neck pain [M54.2]    SUBJECTIVE  Pain Level (0-10 scale): 4  Any medication changes, allergies to medications, adverse drug reactions, diagnosis change, or new procedure performed?: [x] No    [] Yes (see summary sheet for update)  Subjective functional status/changes:   [x] See Eval form in paper chart     OBJECTIVE    Modality rationale: decrease pain and increase tissue extensibility to improve the patients ability to perform ADLs   Min Type Additional Details      []? Estim:  []? Unatt       []? IFC  []? Premod                        []?Other:  []?w/ice   []?w/heat  Position:  Location:      []? Estim: []? Att    []? TENS instruct  []? NMES                    []?Other:  []?w/US   []?w/ice   []?w/heat  Position:  Location:    10 [x]? Traction: [x]? Cervical       []? Lumbar                       []? Prone          [x]? Supine                       [x]? Intermittent   []? Continuous Lbs: 15/8  []? before manual  []? after manual      []? Ultrasound: []? Continuous   []? Pulsed                           []? 1MHz   []? 3MHz W/cm2:  Location:      []? Iontophoresis with dexamethasone         Location: []? Take home patch   []? In clinic      []? Ice     []?  heat  []? Ice massage  []? Laser   []? Anodyne Position:  Location:      []? Laser with stim  []? Other:  Position:  Location:      []? Vasopneumatic Device    []? Right     []? Left  Pre-treatment girth:  Post-treatment girth:  Measured at (location):  Pressure:       []? lo []? med []? hi   Temperature: []? lo []? med []? hi    [x]? Skin assessment post-treatment:  [x]? intact []? redness- no adverse reaction    []? redness  adverse reaction:      10 min Therapeutic Exercise:  -? See flow sheet :   Rationale: increase ROM and increase strength to improve the patients ability to increase activity tolerance     25 min Therapeutic Activity:  [x]? See flow sheet : reassessment of goals - foto-  strengthening activities   Rationale: increase ROM, increase strength and improve coordination  to improve the patients ability to perform functional lifting and carrying               With   [] TE   [] TA   [] neuro   [] other: Patient Education: [x] Review HEP    [] Progressed/Changed HEP based on:   [] positioning   [] body mechanics   [] transfers   [] heat/ice application    [] other:           Pain Level (0-10 scale) post treatment: 4, also reports improved sensation into left UE    ASSESSMENT:   [x]  See PN         Goals:  Goals: to be achieved in 4 weeks:  1. Pt will report increased functional independence as noted by FOTO score of 61.               PN: 60  2. Pt will demonstrate improved bilateral low trap and right rhomboid strength of 5/5 in order to improve ability to tolerate sustained postures.               PN:LT: 4/5, rhomboids 5/5  3. Pt will demonstrate increased right  strength to 80lbs in order to improve ability to hold and carry items.                CT: 75# on right and left     PLAN      [x]  Continue plan of care           Claudio Cote PT 9/15/2021  12:36 PM

## 2021-09-15 NOTE — PROGRESS NOTES
[facility]  [address], [address] [phone] fax    Physician Update  [x] Progress Note  [] Discharge Summary    Patient name: Aditya Zuniga Start of Care: 2021   Referral source: Roxana \[facility] [place] [facility]\"", MD : 1980                Medical Diagnosis: Neck pain [M54.2]  Payor: Angelo Seay / Plan: [facility] COMMUNITY PLAN [employer]P / Product Type: Managed Care Medicaid /  Onset Date:May 2020                Treatment Diagnosis: neck pain   Prior Hospitalization: see medical history Provider#: [id number]   Medications: Verified on Patient summary List    Comorbidities: Depression, Arthritis, Tobacco Use, Asthma, Scoliosis, Congential Hip Displaysia   Prior Level of Function: functionally (I), right hand dominant, frequent computer use       Visits from Start of Care: 20    Missed Visits: 0    Progress towards Goals:   1. Pt will report increased functional independence as noted by FOTO score of 61.               PN:  No change: 51              PROGRESSIN  2. Pt will demonstrate improved bilateral low trap and right rhomboid strength of 5/5 in order to improve ability to tolerate sustained postures.               PN:Progressing: B LT: 4-/5, B Rhomboids 5-/5              PROGRESSING:LT: 4/5, rhomboids 5/5  3. Pt will demonstrate increased right  strength to 80lbs in order to improve ability to hold and carry items.              PN: progressing 78#               NO CHANGE since PN: 75# on right and left     Functional Gains: UE strength, posture,   Functional Deficits: intermittent pain depending on activity level  % improvement: 20-30%  Pain   Average: 4-5/10       Best: 10     Worst: 7/10 - when first waking up   Patient Goal: \"further improvement of overall pain and function.  More  strength, especially on the left side\"    Goals: to be achieved in 4 weeks:  1. Pt will report increased functional independence as noted by FOTO score Loan.               PN: 60  2. Pt will demonstrate improved bilateral low trap and right rhomboid strength of 5/5 in order to improve ability to tolerate sustained postures.               PN:LT: 4/5, rhomboids 5/5  3. Pt will demonstrate increased right  strength to 80lbs in order to improve ability to hold and carry items.              PN: 75# on right and left     ASSESSMENT/RECOMMENDATIONS:   Mr. [employer] continues to improve with scapular stability and posture, but pain does wax/wane depending on activity level. He responded well to mechanical traction for reduction in pain, and will continue to benefit from PT to address  strength and overhead function. [x]Continue therapy per initial plan/protocol at a frequency of  2 x per week for 4 weeks  []Continue therapy with the following recommended changes:_____________________      _____________________________________________________________________  []Discontinue therapy progressing towards or have reached established goals  []Discontinue therapy due to lack of appreciable progress towards goals  []Discontinue therapy due to lack of attendance or compliance  []Await Physician's recommendations/decisions regarding therapy  []Other:________________________________________________________________    Thank you for this referral.   Adrienne Paige, PT 9/15/2021 12:07 PM  NOTE TO PHYSICIAN:  PLEASE COMPLETE THE ORDERS BELOW AND   FAX TO [facility]: ([phone]  If you are unable to process this request in 24 hours please contact our office: [phone]    []  I have read the above report and request that my patient continue as recommended. []  I have read the above report and request that my patient continue therapy with the following changes/special instructions:________________________________________  []I have read the above report and request that my patient be discharged from therapy.     [facility] Signature:____________Date:_________TIME:________     Kenya Gupta MD  ** Signature, Date and Time must be completed for valid certification **

## 2021-09-21 ENCOUNTER — HOSPITAL ENCOUNTER (OUTPATIENT)
Dept: PHYSICAL THERAPY | Age: 41
Discharge: HOME | End: 2021-09-21
Payer: MEDICAID

## 2021-09-21 PROCEDURE — 97112 NEUROMUSCULAR REEDUCATION: CPT

## 2021-09-21 PROCEDURE — 97110 THERAPEUTIC EXERCISES: CPT

## 2021-09-21 PROCEDURE — 97530 THERAPEUTIC ACTIVITIES: CPT

## 2021-09-21 NOTE — PROGRESS NOTES
PT DAILY TREATMENT NOTE     Patient Name: Nathen Yuan  Date:2021  : 1980  [x]  Patient  Verified  Payor: [facility] MEDICAID / Plan: [employer] COMMUNITY PLAN [employer]P / Product Type: Managed Care Medicaid /    In time:1201  Out time:1251  Total Treatment Time (min): 50  Visit #: 1 of 8    Treatment Area: Neck pain [M54.2]    SUBJECTIVE  Pain Level (0-10 scale): 2-3  Any medication changes, allergies to medications, adverse drug reactions, diagnosis change, or new procedure performed?: [x] No    [] Yes (see summary sheet for update)  Subjective functional status/changes:   [] No changes reported  Patient states he has a migraine today. \"I don't know if it's my normal migraine or a migraine due to the traction.     OBJECTIVE    Modality rationale: decrease edema, decrease inflammation, decrease pain, increase tissue extensibility and increase muscle contraction/control to improve the patients ability to perform ADL's pain free   Min Type Additional Details    [] Estim:  []Unatt       []IFC  []Premod                        []Other:  []w/ice   []w/heat  Position:  Location:    [] Estim: []Att    []TENS instruct  []NMES                    []Other:  []w/US   []w/ice   []w/heat  Position:  Location:    []  Traction: [] Cervical       []Lumbar                       [] Prone          []Supine                       []Intermittent   []Continuous Lbs:  [] before manual  [] after manual    []  Ultrasound: []Continuous   [] Pulsed                           []1MHz   []3MHz W/cm2:  Location:    []  Iontophoresis with dexamethasone         Location: [] Take home patch   [] In clinic   10 []  Ice     [x]  heat  []  Ice massage  []  Laser   []  Anodyne Position:neck  Location:seated    []  Laser with stim  []  Other:  Position:  Location:    []  Vasopneumatic Device    []  Right     []  Left  Pre-treatment girth:  Post-treatment girth:  Measured at (location):  Pressure:       [] lo [] med [] hi   Temperature: [] lo [] med [] hi   [x] Skin assessment post-treatment:  [x]intact []redness- no adverse reaction    []redness  adverse reaction:       20 min Therapeutic Exercise:  [x] See flow sheet :   Rationale: increase ROM, increase strength, improve coordination, improve balance and increase proprioception to improve the patients ability to perform ADL's pain free. 10 min Therapeutic Activity:  [x]  See flow sheet : postural activities   Rationale: increase ROM, increase strength, improve coordination, improve balance and increase proprioception  to improve the patients ability to perform ADL's pain free. 10 min Neuromuscular Re-education:  [x]  See flow sheet : scapular re-ed   Rationale: increase ROM, increase strength, improve coordination, improve balance and increase proprioception  to improve the patients ability to perform ADL's pain free. With   [] TE   [] TA   [] neuro   [] other: Patient Education: [x] Review HEP    [] Progressed/Changed HEP based on:   [] positioning   [] body mechanics   [] transfers   [] heat/ice application    [] other:      Other Objective/Functional Measures: Patient declined mechanical tx this visit. Withheld weight for prone scap squeezes, T, W and Y. Pain Level (0-10 scale) post treatment: 3    ASSESSMENT/Changes in Function: Patient making progress in therapy. Noted decreased radicular symptoms after completion of therapy but slight increase in pain. Will attempt mechanical tx next visit per patient tolerance.      Patient will continue to benefit from skilled PT services to modify and progress therapeutic interventions, address functional mobility deficits, address ROM deficits, address strength deficits, analyze and address soft tissue restrictions, analyze and cue movement patterns, analyze and modify body mechanics/ergonomics, assess and modify postural abnormalities, address imbalance/dizziness and instruct in home and community integration to attain remaining goals.     []  See Plan of Care  []  See progress note/recertification  []  See Discharge Summary         Progress towards goals / Updated goals:  Goals: to be achieved in 4 weeks:  1. Pt will report increased functional independence as noted by FOTO score of 61.               PN: 60  2. Pt will demonstrate improved bilateral low trap and right rhomboid strength of 5/5 in order to improve ability to tolerate sustained postures.               PN:LT: 4/5, rhomboids 5/5  3. Pt will demonstrate increased right  strength to 80lbs in order to improve ability to hold and carry items.                UN: 75# on right and left     PLAN  []  Upgrade activities as tolerated     [x]  Continue plan of care  []  Update interventions per flow sheet       []  Discharge due to:_  []  Other:_      Rashid Palacio PTA 9/21/2021  12:11 PM    Future Appointments   Date Time Provider Joseph Barber   9/22/2021 12:00 PM Ab Chung [facility]   9/28/2021 12:00 [facility]   9/30/2021 12:00 PM Ab Chung [facility]   10/5/2021 12:00 PM Sangita Hopson PTA [facility]   10/7/2021 12:00 PM Francisco J Newell PTA [facility]

## 2021-09-22 ENCOUNTER — HOSPITAL ENCOUNTER (OUTPATIENT)
Dept: PHYSICAL THERAPY | Age: 41
Discharge: HOME | End: 2021-09-22
Payer: MEDICAID

## 2021-09-22 PROCEDURE — 97530 THERAPEUTIC ACTIVITIES: CPT

## 2021-09-22 PROCEDURE — 97112 NEUROMUSCULAR REEDUCATION: CPT

## 2021-09-22 PROCEDURE — 97110 THERAPEUTIC EXERCISES: CPT

## 2021-09-22 NOTE — PROGRESS NOTES
PT DAILY TREATMENT NOTE     Patient Name: Nathen Yuan  Date:2021  : 1980  [x]  Patient  Verified  Payor: [facility] MEDICAID / Plan: [facility] [employer] COMMUNITY [employer]AN [employer]P / Product Type: Managed Care Medicaid /    In time:1200  Out time:1242  Total Treatment Time (min): 42  Visit #: 2 of 8      Treatment Area: Neck pain [M54.2]    SUBJECTIVE  Pain Level (0-10 scale): 3  Any medication changes, allergies to medications, adverse drug reactions, diagnosis change, or new procedure performed?: [x] No    [] Yes (see summary sheet for update)  Subjective functional status/changes:   [] No changes reported  Patient reports he still has a migraine and is not sure if it's from the traction or not. OBJECTIVE    17 min Therapeutic Exercise:  [x] See flow sheet :   Rationale: increase ROM and increase strength to improve the patients ability to increase activity tolerance    10 min Therapeutic Activity:  [x]  See flow sheet :  strengthening activities   Rationale: increase ROM, increase strength and improve coordination  to improve the patients ability to  and carry objects     15 min Neuromuscular Re-education:  [x]  See flow sheet : scap and postural re-ed   Rationale: increase strength, improve coordination, improve balance and increase proprioception  to improve the patients ability to tolerate sustained postures            With   [] TE   [] TA   [] neuro   [] other: Patient Education: [x] Review HEP    [] Progressed/Changed HEP based on:   [] positioning   [] body mechanics   [] transfers   [] heat/ice application    [] other:      Other Objective/Functional Measures: held traction     Pain Level (0-10 scale) post treatment: 4    ASSESSMENT/Changes in Function: Patient reports continued migraine and he's still unsure if it was caused by traction; requests to hold until NV. He tolerated most other exercises without pain but was unable to complete prone Y's due to left shoulder pain.  Resume traction at initial max/min levels NV per patient tolerance. Patient will continue to benefit from skilled PT services to modify and progress therapeutic interventions, address functional mobility deficits, address ROM deficits, address strength deficits, analyze and address soft tissue restrictions, analyze and cue movement patterns, analyze and modify body mechanics/ergonomics, assess and modify postural abnormalities, address imbalance/dizziness and instruct in home and community integration to attain remaining goals. []  See Plan of Care  []  See progress note/recertification  []  See Discharge Summary         Progress towards goals / Updated goals:  Goals: to be achieved in 4 weeks:  1. Pt will report increased functional independence as noted by FOTO score of 61.               PN: 60  2. Pt will demonstrate improved bilateral low trap and right rhomboid strength of 5/5 in order to improve ability to tolerate sustained postures.               PN:LT: 4/5, rhomboids 5/5  3. Pt will demonstrate increased right  strength to 80lbs in order to improve ability to hold and carry items.                FY: 75# on right and left     PLAN  [x]  Upgrade activities as tolerated     []  Continue plan of care  []  Update interventions per flow sheet       []  Discharge due to:_  []  Other:_      Claudia Scott PTA 9/22/2021  11:49 AM    Future Appointments   Date Time Provider Joseph Barber   9/22/2021 12:00 PM [facility]   9/28/2021 12:00 PM [facility]put [facility]   9/30/2021 12:00 PM [facility]   10/5/2021 12:00 PM [facility]upinder Izquierdo [facility]   10/7/2021 12:00 PM Pruitt, Marylen Rumpf, [facility]

## 2021-09-28 ENCOUNTER — HOSPITAL ENCOUNTER (OUTPATIENT)
Dept: PHYSICAL THERAPY | Age: 41
Discharge: HOME | End: 2021-09-28
Payer: MEDICAID

## 2021-09-28 PROCEDURE — 97112 NEUROMUSCULAR REEDUCATION: CPT

## 2021-09-28 PROCEDURE — 97530 THERAPEUTIC ACTIVITIES: CPT

## 2021-09-28 PROCEDURE — 97012 MECHANICAL TRACTION THERAPY: CPT

## 2021-09-28 PROCEDURE — 97110 THERAPEUTIC EXERCISES: CPT

## 2021-09-28 NOTE — PROGRESS NOTES
PT DAILY TREATMENT NOTE     Patient Name: Leodan [employer]  Date:2021  : 1980  [x]  Patient  Verified  Payor: [facility] MEDICAID / Plan: [facility] [employer]UN[employer]AN [employer] / Product Type: Managed Care Medicaid /    In time:1200  Out time:1248  Total Treatment Time (min): 48  Visit #: 3 of 8    Treatment Area: Neck pain [M54.2]    SUBJECTIVE  Pain Level (0-10 scale): 2.5-3  Any medication changes, allergies to medications, adverse drug reactions, diagnosis change, or new procedure performed?: [x] No    [] Yes (see summary sheet for update)  Subjective functional status/changes:   [] No changes reported  Pt would like to try traction on the lowest level today due to having a migraine today, would like to see if it will help.      OBJECTIVE    Modality rationale: decrease pain to improve the patients ability to perform ADLs after therapy    Min Type Additional Details    [] Estim:  []Unatt       []IFC  []Premod                        []Other:  []w/ice   []w/heat  Position:  Location:    [] Estim: []Att    []TENS instruct  []NMES                    []Other:  []w/US   []w/ice   []w/heat  Position:  Location:   10 [x]  Traction: [x] Cervical       []Lumbar                       [] Prone          []Supine                       [x]Intermittent   []Continuous Lbs: 15/8lbs  [] before manual  [] after manual    []  Ultrasound: []Continuous   [] Pulsed                           []1MHz   []3MHz W/cm2:  Location:    []  Iontophoresis with dexamethasone         Location: [] Take home patch   [] In clinic    []  Ice     []  heat  []  Ice massage  []  Laser   []  Anodyne Position:  Location:    []  Laser with stim  []  Other:  Position:  Location:    []  Vasopneumatic Device    []  Right     []  Left  Pre-treatment girth:  Post-treatment girth:  Measured at (location):  Pressure:       [] lo [] med [] hi   Temperature: [] lo [] med [] hi   [] Skin assessment post-treatment:  []intact []redness- no adverse reaction []redness  adverse reaction:     15 min Therapeutic Exercise:  [x] See flow sheet :   Rationale: increase ROM and increase strength to improve the patients ability to perform ADLs    8 min Therapeutic Activity:  []  See flow sheet :    Rationale: increase strength and improve coordination  to improve the patients ability to functional carry and lift     15 min Neuromuscular Re-education:  [x]  See flow sheet : scapular jamaal   Rationale: increase strength, improve coordination and increase proprioception  to improve the patients ability to perform ADLs          With   [] TE   [] TA   [] neuro   [] other: Patient Education: [x] Review HEP    [] Progressed/Changed HEP based on:   [] positioning   [] body mechanics   [] transfers   [] heat/ice application    [] other:      Other Objective/Functional Measures:      Pain Level (0-10 scale) post treatment: 1    ASSESSMENT/Changes in Function: Pt reporting pain relief and moderate HA relief with traction; would like to continue at current weight levels. Discussed home units and suggested pt trial in clinic traction a few more times before committing to a home unit. Patient will continue to benefit from skilled PT services to modify and progress therapeutic interventions, address functional mobility deficits, address ROM deficits, address strength deficits, analyze and address soft tissue restrictions, analyze and cue movement patterns, analyze and modify body mechanics/ergonomics, assess and modify postural abnormalities, address imbalance/dizziness and instruct in home and community integration to attain remaining goals.      []  See Plan of Care  []  See progress note/recertification  []  See Discharge Summary         Progress towards goals / Updated goals:  1. Pt will report increased functional independence as noted by FOTO score of 61.               PN: 60   To be reassessed at end of POC  2. Pt will demonstrate improved bilateral low trap and right rhomboid strength of 5/5 in order to improve ability to tolerate sustained postures.               PN:LT: 4/5, rhomboids 5/5   Tolerating progression of exercises  3. Pt will demonstrate increased right  strength to 80lbs in order to improve ability to hold and carry items.                GITA: 75# on right and left    Tolerating progression of exercises    PLAN  [x]  Upgrade activities as tolerated     [x]  Continue plan of care  []  Update interventions per flow sheet       []  Discharge due to:_  []  Other:_      Jani Dukes, PT 9/28/2021  12:05 PM    Future Appointments   Date Time Provider Joseph Barber   9/30/2021 12:00 [facility]   10/5/2021 12:00 PM [facility]   10/7/2021 12:00 PM [facility]

## 2021-09-30 ENCOUNTER — HOSPITAL ENCOUNTER (OUTPATIENT)
Dept: PHYSICAL THERAPY | Age: 41
Discharge: HOME | End: 2021-09-30
Payer: MEDICAID

## 2021-09-30 PROCEDURE — 97112 NEUROMUSCULAR REEDUCATION: CPT

## 2021-09-30 PROCEDURE — 97530 THERAPEUTIC ACTIVITIES: CPT

## 2021-09-30 PROCEDURE — 97110 THERAPEUTIC EXERCISES: CPT

## 2021-09-30 PROCEDURE — 97012 MECHANICAL TRACTION THERAPY: CPT

## 2021-09-30 NOTE — PROGRESS NOTES
PT DAILY TREATMENT NOTE     Patient Name: Leodan Lansford  Date:2021  : 1980  [x]  Patient  Verified  Payor: [facility] MEDICAID / Plan: [facility] [employer]AN [employer]P / Product Type: Managed Care Medicaid /    In time:1203  Out time:1258  Total Treatment Time (min): 55  Visit #: 4 of 8      Treatment Area: Neck pain [M54.2]    SUBJECTIVE  Pain Level (0-10 scale): 3.5  Any medication changes, allergies to medications, adverse drug reactions, diagnosis change, or new procedure performed?: [x] No    [] Yes (see summary sheet for update)  Subjective functional status/changes:   [] No changes reported  Patient reports he didn't get much sleep last night because of muscle cramps in his legs.     OBJECTIVE    Modality rationale: decrease pain and increase tissue extensibility to improve the patients ability to perform ADLs   Min Type Additional Details    [] Estim:  []Unatt       []IFC  []Premod                        []Other:  []w/ice   []w/heat  Position:  Location:    [] Estim: []Att    []TENS instruct  []NMES                    []Other:  []w/US   []w/ice   []w/heat  Position:  Location:   10 [x]  Traction: [x] Cervical       []Lumbar                       [] Prone          []Supine                       [x]Intermittent   []Continuous Lbs: 15/8  [] before manual  [] after manual    []  Ultrasound: []Continuous   [] Pulsed                           []1MHz   []3MHz W/cm2:  Location:    []  Iontophoresis with dexamethasone         Location: [] Take home patch   [] In clinic    []  Ice     []  heat  []  Ice massage  []  Laser   []  Anodyne Position:  Location:    []  Laser with stim  []  Other:  Position:  Location:    []  Vasopneumatic Device    []  Right     []  Left  Pre-treatment girth:  Post-treatment girth:  Measured at (location):  Pressure:       [] lo [] med [] hi   Temperature: [] lo [] med [] hi   [x] Skin assessment post-treatment:  [x]intact []redness- no adverse reaction    []redness  adverse reaction:     15 min Therapeutic Exercise:  [x] See flow sheet :   Rationale: increase ROM and increase strength to improve the patients ability to increase activity tolerance    10 min Therapeutic Activity:  [x]  See flow sheet :  strengthening activities   Rationale: increase ROM, increase strength and improve coordination  to improve the patients ability to  and carry objects     20 min Neuromuscular Re-education:  [x]  See flow sheet : scap/postural re-ed   Rationale: increase strength, improve coordination, improve balance and increase proprioception  to improve the patients ability to tolerate sustained postures          With   [] TE   [] TA   [] neuro   [] other: Patient Education: [x] Review HEP    [] Progressed/Changed HEP based on:   [] positioning   [] body mechanics   [] transfers   [] heat/ice application    [] other:      Other Objective/Functional Measures: increased reps per flow sheet     Pain Level (0-10 scale) post treatment: 1.5    ASSESSMENT/Changes in Function: Despite fatigue from lack of sleep, patient able to increase reps with exercises. He had c/o pain into his right shoulder with chin tucks but otherwise tolerated exercises well. He reported improvement in pain following session. Patient will continue to benefit from skilled PT services to modify and progress therapeutic interventions, address functional mobility deficits, address ROM deficits, address strength deficits, analyze and address soft tissue restrictions, analyze and cue movement patterns, analyze and modify body mechanics/ergonomics, assess and modify postural abnormalities, address imbalance/dizziness and instruct in home and community integration to attain remaining goals.      []  See Plan of Care  []  See progress note/recertification  []  See Discharge Summary         Progress towards goals / Updated goals:  1. Pt will report increased functional independence as noted by FOTO score of 62.               PN: 60              To be reassessed at end of POC  2. Pt will demonstrate improved bilateral low trap and right rhomboid strength of 5/5 in order to improve ability to tolerate sustained postures.               PN:LT: 4/5, rhomboids 5/5              Tolerating progression of exercises  3. Pt will demonstrate increased right  strength to 80lbs in order to improve ability to hold and carry items.                CN: 75# on right and left               Tolerating progression of exercises    PLAN  [x]  Upgrade activities as tolerated     []  Continue plan of care  []  Update interventions per flow sheet       []  Discharge due to:_  []  Other:_      Martha Magana PTA 9/30/2021  11:58 AM    Future Appointments   Date Time Provider Joseph Barber   9/30/2021 12:00 PM Parish Hinkle [facility]   10/5/2021 12:00 PM ABIGAIL BazanPTFANY [facility]   10/7/2021 12:00 PM ABIGAIL BazanPTFANY [facility]

## 2021-10-05 ENCOUNTER — HOSPITAL ENCOUNTER (OUTPATIENT)
Dept: PHYSICAL THERAPY | Age: 41
Discharge: HOME | End: 2021-10-05
Payer: MEDICAID

## 2021-10-05 PROCEDURE — 97112 NEUROMUSCULAR REEDUCATION: CPT

## 2021-10-05 PROCEDURE — 97012 MECHANICAL TRACTION THERAPY: CPT

## 2021-10-05 PROCEDURE — 97110 THERAPEUTIC EXERCISES: CPT

## 2021-10-05 PROCEDURE — 97530 THERAPEUTIC ACTIVITIES: CPT

## 2021-10-05 NOTE — PROGRESS NOTES
PT DAILY TREATMENT NOTE     Patient Name: Kerri Nolan  Date:10/5/2021  : 1980  [x]  Patient  Verified  Payor: Bianca Dupree / Plan: [facility] COMMUNITY PLAN [employer] / Product Type: Managed Care Medicaid /    In time:1200  Out time:1246  Total Treatment Time (min): 46  Visit #: 5 of 8    Treatment Area: Neck pain [M54.2]    SUBJECTIVE  Pain Level (0-10 scale): 7-8  Any medication changes, allergies to medications, adverse drug reactions, diagnosis change, or new procedure performed?: [x] No    [] Yes (see summary sheet for update)  Subjective functional status/changes:   [] No changes reported  Patient reports he woke up Saturday feeling like his neck locked up and has had worse pain and burning down his left arm since.     OBJECTIVE    Modality rationale: decrease pain and increase tissue extensibility to improve the patients ability to perform ADLs   Min Type Additional Details    [] Estim:  []Unatt       []IFC  []Premod                        []Other:  []w/ice   []w/heat  Position:  Location:    [] Estim: []Att    []TENS instruct  []NMES                    []Other:  []w/US   []w/ice   []w/heat  Position:  Location:   10 [x]  Traction: [x] Cervical       []Lumbar                       [] Prone          []Supine                       [x]Intermittent   []Continuous Lbs: 15/8  [] before manual  [] after manual    []  Ultrasound: []Continuous   [] Pulsed                           []1MHz   []3MHz W/cm2:  Location:    []  Iontophoresis with dexamethasone         Location: [] Take home patch   [] In clinic    []  Ice     []  heat  []  Ice massage  []  Laser   []  Anodyne Position:  Location:    []  Laser with stim  []  Other:  Position:  Location:    []  Vasopneumatic Device    []  Right     []  Left  Pre-treatment girth:  Post-treatment girth:  Measured at (location):  Pressure:       [] lo [] med [] hi   Temperature: [] lo [] med [] hi   [x] Skin assessment post-treatment:  [x]intact []redness- no adverse reaction    []redness  adverse reaction:     18 min Therapeutic Exercise:  [x] See flow sheet :   Rationale: increase ROM and increase strength to improve the patients ability to increase activity tolerance    10 min Therapeutic Activity:  [x]  See flow sheet : gripping activities   Rationale: increase ROM, increase strength and improve coordination  to improve the patients ability to  and carry objects     8 min Neuromuscular Re-education:  [x]  See flow sheet : cervical/scap re-ed   Rationale: increase strength, improve coordination, improve balance and increase proprioception  to improve the patients ability to tolerate sustained postures          With   [] TE   [] TA   [] neuro   [] other: Patient Education: [x] Review HEP    [] Progressed/Changed HEP based on:   [] positioning   [] body mechanics   [] transfers   [] heat/ice application    [] other:      Other Objective/Functional Measures: held some exercises due to increased pain     Pain Level (0-10 scale) post treatment: 5.5    ASSESSMENT/Changes in Function: Held some activities due to patient having increased pain. He tolerated gripping and cervical stability  exercises well and reported improvement in symptoms following traction. Patient will continue to benefit from skilled PT services to modify and progress therapeutic interventions, address functional mobility deficits, address ROM deficits, address strength deficits, analyze and address soft tissue restrictions, analyze and cue movement patterns, analyze and modify body mechanics/ergonomics, assess and modify postural abnormalities, address imbalance/dizziness and instruct in home and community integration to attain remaining goals.      []  See Plan of Care  []  See progress note/recertification  []  See Discharge Summary         Progress towards goals / Updated goals:  1. Pt will report increased functional independence as noted by FOTO score of 62.               PN: 60              To be reassessed at end of POC  2. Pt will demonstrate improved bilateral low trap and right rhomboid strength of 5/5 in order to improve ability to tolerate sustained postures.               PN:LT: 4/5, rhomboids 5/5              Tolerating progression of exercises  3. Pt will demonstrate increased right  strength to 80lbs in order to improve ability to hold and carry items.                PN: 75# on right and left               Tolerating progression of exercises    PLAN  []  Upgrade activities as tolerated     []  Continue plan of care  []  Update interventions per flow sheet       []  Discharge due to:_  []  Other:_      Krzysztof Boss PTA 10/5/2021  11:37 AM    Future Appointments   Date Time Provider Joseph Barber   10/5/2021 12:00 PM Milla Hull [facility]   10/7/2021 12:00 PM Arslan Cash PTA [facility]

## 2021-10-07 ENCOUNTER — HOSPITAL ENCOUNTER (OUTPATIENT)
Dept: PHYSICAL THERAPY | Age: 41
Discharge: HOME | End: 2021-10-07
Payer: MEDICAID

## 2021-10-07 PROCEDURE — 97530 THERAPEUTIC ACTIVITIES: CPT

## 2021-10-07 PROCEDURE — 97012 MECHANICAL TRACTION THERAPY: CPT

## 2021-10-07 PROCEDURE — 97110 THERAPEUTIC EXERCISES: CPT

## 2021-10-07 PROCEDURE — 97112 NEUROMUSCULAR REEDUCATION: CPT

## 2021-10-07 NOTE — PROGRESS NOTES
PT DAILY TREATMENT NOTE     Patient Name: Bethany Hall  Date:10/7/2021  : 1980  [x]  Patient  Verified  Payor: [facility] MEDICAID / Plan: [facility] COMMUNITY PLAN [employer] / Product Type: Managed Care Medicaid /    In time:1203  Out time:1252  Total Treatment Time (min): 49  Visit #: 6 of 8        Treatment Area: Neck pain [M54.2]    SUBJECTIVE  Pain Level (0-10 scale): 3-4  Any medication changes, allergies to medications, adverse drug reactions, diagnosis change, or new procedure performed?: [x] No    [] Yes (see summary sheet for update)  Subjective functional status/changes:   [] No changes reported  Patient reports he's doing much better than he was the other day. The pain is still in the same area but it's better.     OBJECTIVE    Modality rationale: decrease pain and increase tissue extensibility to improve the patients ability to perform ADLs   Min Type Additional Details    [] Estim:  []Unatt       []IFC  []Premod                        []Other:  []w/ice   []w/heat  Position:  Location:    [] Estim: []Att    []TENS instruct  []NMES                    []Other:  []w/US   []w/ice   []w/heat  Position:  Location:   10 [x]  Traction: [x] Cervical       []Lumbar                       [] Prone          []Supine                       [x]Intermittent   []Continuous Lbs: 15/8  [] before manual  [] after manual    []  Ultrasound: []Continuous   [] Pulsed                           []1MHz   []3MHz W/cm2:  Location:    []  Iontophoresis with dexamethasone         Location: [] Take home patch   [] In clinic    []  Ice     []  heat  []  Ice massage  []  Laser   []  Anodyne Position:  Location:    []  Laser with stim  []  Other:  Position:  Location:    []  Vasopneumatic Device    []  Right     []  Left  Pre-treatment girth:  Post-treatment girth:  Measured at (location):  Pressure:       [] lo [] med [] hi   Temperature: [] lo [] med [] hi   [x] Skin assessment post-treatment:  [x]intact []redness- no adverse reaction    []redness  adverse reaction:     20 min Therapeutic Exercise:  [x] See flow sheet :   Rationale: increase ROM and increase strength to improve the patients ability to increase activity tolerance    9 min Therapeutic Activity:  [x]  See flow sheet : gripping activities   Rationale: increase ROM, increase strength and improve coordination  to improve the patients ability to  and hold objects     10 min Neuromuscular Re-education:  [x]  See flow sheet : scap, postural re-ed   Rationale: increase strength, improve coordination, improve balance and increase proprioception  to improve the patients ability to tolerate sustained postures        With   [] TE   [] TA   [] neuro   [] other: Patient Education: [x] Review HEP    [] Progressed/Changed HEP based on:   [] positioning   [] body mechanics   [] transfers   [] heat/ice application    [] other:      Other Objective/Functional Measures:      Pain Level (0-10 scale) post treatment: 4-5    ASSESSMENT/Changes in Function: Patient continues to have left periscapular pain and radicular symptoms but it has improved since last visit. Continued to hold prone exercises to avoid exacerbation of symptoms. Will resume as tolerated NV. Patient will continue to benefit from skilled PT services to modify and progress therapeutic interventions, address functional mobility deficits, address ROM deficits, address strength deficits, analyze and address soft tissue restrictions, analyze and cue movement patterns, analyze and modify body mechanics/ergonomics, assess and modify postural abnormalities, address imbalance/dizziness and instruct in home and community integration to attain remaining goals.      []  See Plan of Care  []  See progress note/recertification  []  See Discharge Summary         Progress towards goals / Updated goals:  1. Pt will report increased functional independence as noted by FOTO score of 61.               PN: 60              IN be reassessed at end of POC  2. Pt will demonstrate improved bilateral low trap and right rhomboid strength of 5/5 in order to improve ability to tolerate sustained postures.               PN:LT: 4/5, rhomboids 5/5              Tolerating progression of exercises  3. Pt will demonstrate increased right  strength to 80lbs in order to improve ability to hold and carry items.                GARRETT: 75# on right and left               Tolerating progression of exercises    PLAN  []  Upgrade activities as tolerated     []  Continue plan of care  []  Update interventions per flow sheet       []  Discharge due to:_  []  Other:_      Va Oliver PTA 10/7/2021  12:01 PM    Future Appointments   Date Time Provider Joseph Barber   10/12/2021 12:00 PM [facility]   10/14/2021  1:30 PM [facility]

## 2021-10-12 ENCOUNTER — HOSPITAL ENCOUNTER (OUTPATIENT)
Dept: PHYSICAL THERAPY | Age: 41
Discharge: HOME | End: 2021-10-12
Payer: MEDICAID

## 2021-10-12 PROCEDURE — 97012 MECHANICAL TRACTION THERAPY: CPT

## 2021-10-12 PROCEDURE — 97112 NEUROMUSCULAR REEDUCATION: CPT

## 2021-10-12 PROCEDURE — 97530 THERAPEUTIC ACTIVITIES: CPT

## 2021-10-12 PROCEDURE — 97110 THERAPEUTIC EXERCISES: CPT

## 2021-10-12 NOTE — PROGRESS NOTES
PT DAILY TREATMENT NOTE     Patient Name: Kristin Diehl  Date:10/12/2021  : 1980  [x]  Patient  Verified  Payor: Eagle Landon / Plan: [facility] COMMUNITY PLAN [facility] / Product Type: Managed Care Medicaid /    In time:1200  Out time:1250  Total Treatment Time (min): 50  Visit #: 7 of 8    Treatment Area: Neck pain [M54.2]    SUBJECTIVE  Pain Level (0-10 scale): 3  Any medication changes, allergies to medications, adverse drug reactions, diagnosis change, or new procedure performed?: [x] No    [] Yes (see summary sheet for update)  Subjective functional status/changes:   [] No changes reported  Patient reports his shoulder blade is feeling better but his migraine hasn't gone away.     OBJECTIVE    Modality rationale: decrease pain and increase tissue extensibility to improve the patients ability to perform ADLs   Min Type Additional Details    [] Estim:  []Unatt       []IFC  []Premod                        []Other:  []w/ice   []w/heat  Position:  Location:    [] Estim: []Att    []TENS instruct  []NMES                    []Other:  []w/US   []w/ice   []w/heat  Position:  Location:   10 [x]  Traction: [x] Cervical       []Lumbar                       [] Prone          [x]Supine                       [x]Intermittent   []Continuous Lbs:15/8  [] before manual  [] after manual    []  Ultrasound: []Continuous   [] Pulsed                           []1MHz   []3MHz W/cm2:  Location:    []  Iontophoresis with dexamethasone         Location: [] Take home patch   [] In clinic    []  Ice     []  heat  []  Ice massage  []  Laser   []  Anodyne Position:  Location:    []  Laser with stim  []  Other:  Position:  Location:    []  Vasopneumatic Device    []  Right     []  Left  Pre-treatment girth:  Post-treatment girth:  Measured at (location):  Pressure:       [] lo [] med [] hi   Temperature: [] lo [] med [] hi   [x] Skin assessment post-treatment:  [x]intact []redness- no adverse reaction    []redness  adverse reaction:     10 min Therapeutic Exercise:  [x] See flow sheet :   Rationale: increase ROM and increase strength to improve the patients ability to increase activity tolerance    10 min Therapeutic Activity:  [x]  See flow sheet :  strengthening activities   Rationale: increase ROM, increase strength and improve coordination  to improve the patients ability to  and carry objects     20 min Neuromuscular Re-education:  [x]  See flow sheet : scapular/postural re-ed   Rationale: increase strength, improve coordination, improve balance and increase proprioception  to improve the patients ability to tolerate sustained postures          With   [] TE   [] TA   [] neuro   [] other: Patient Education: [x] Review HEP    [] Progressed/Changed HEP based on:   [] positioning   [] body mechanics   [] transfers   [] heat/ice application    [] other:      Other Objective/Functional Measures:      Pain Level (0-10 scale) post treatment: 3    ASSESSMENT/Changes in Function: Patient tolerated exercises well without flare up of symptoms. He continues to report benefit from mechanical traction. Added prone scapular exercises to HEP. Patient will continue to benefit from skilled PT services to modify and progress therapeutic interventions, address functional mobility deficits, address ROM deficits, address strength deficits, analyze and address soft tissue restrictions, analyze and cue movement patterns, analyze and modify body mechanics/ergonomics, assess and modify postural abnormalities, address imbalance/dizziness and instruct in home and community integration to attain remaining goals.      []  See Plan of Care  []  See progress note/recertification  []  See Discharge Summary         Progress towards goals / Updated goals:  1. Pt will report increased functional independence as noted by FOTO score of 61.               PN: 60              OW be reassessed at end of POC  2. Pt will demonstrate improved bilateral low trap and right rhomboid strength of 5/5 in order to improve ability to tolerate sustained postures.               PN:LT: 4/5, rhomboids 5/5              Tolerating progression of exercises  3. Pt will demonstrate increased right  strength to 80lbs in order to improve ability to hold and carry items.                PN: 75# on right and left               Tolerating progression of exercises       PLAN  [x]  Upgrade activities as tolerated     []  Continue plan of care  []  Update interventions per flow sheet       []  Discharge due to:_  []  Other:_      Zari Colby PTA 10/12/2021  12:17 PM    Future Appointments   Date Time Provider Joseph Barber   10/14/2021  1:30 PM [facility]

## 2021-10-14 ENCOUNTER — HOSPITAL ENCOUNTER (OUTPATIENT)
Dept: PHYSICAL THERAPY | Age: 41
Discharge: HOME | End: 2021-10-14
Payer: MEDICAID

## 2021-10-14 PROCEDURE — 97012 MECHANICAL TRACTION THERAPY: CPT

## 2021-10-14 PROCEDURE — 97530 THERAPEUTIC ACTIVITIES: CPT

## 2021-10-14 PROCEDURE — 97110 THERAPEUTIC EXERCISES: CPT

## 2021-10-14 PROCEDURE — 97112 NEUROMUSCULAR REEDUCATION: CPT

## 2021-10-14 NOTE — PROGRESS NOTES
PT DAILY TREATMENT NOTE     Patient Name: Kristin Diehl  Date:10/14/2021  : 1980  [x]  Patient  Verified  Payor: Eagle Landon / Plan: [facility] COMMUNITY PLAN [employer] / Product Type: Managed Care Medicaid /    In time:130  Out time:217  Total Treatment Time (min): 47  Visit #: 8 of 8    Treatment Area: Neck pain [M54.2]    SUBJECTIVE  Pain Level (0-10 scale): 3-4  Any medication changes, allergies to medications, adverse drug reactions, diagnosis change, or new procedure performed?: [x] No    [] Yes (see summary sheet for update)  Subjective functional status/changes:   [] No changes reported  \"I'm having symptoms today. \"    OBJECTIVE    Modality rationale: decrease pain to improve the patients ability to improve mobility and positional tolerance   Min Type Additional Details    [] Estim:  []Unatt       []IFC  []Premod                        []Other:  []w/ice   []w/heat  Position:  Location:    [] Estim: []Att    []TENS instruct  []NMES                    []Other:  []w/US   []w/ice   []w/heat  Position:  Location:   10 [x]  Traction: [x] Cervical       []Lumbar                       [] Prone          [x]Supine                       [x]Intermittent   []Continuous Lbs: 15/8  [x] before manual  [] after manual    []  Ultrasound: []Continuous   [] Pulsed                           []1MHz   []3MHz W/cm2:  Location:    []  Iontophoresis with dexamethasone         Location: [] Take home patch   [] In clinic    []  Ice     []  heat  []  Ice massage  []  Laser   []  Anodyne Position:  Location:    []  Laser with stim  []  Other:  Position:  Location:    []  Vasopneumatic Device    []  Right     []  Left  Pre-treatment girth:  Post-treatment girth:  Measured at (location):  Pressure:       [] lo [] med [] hi   Temperature: [] lo [] med [] hi   [x] Skin assessment post-treatment:  [x]intact []redness- no adverse reaction    []redness - adverse reaction:     10 min Therapeutic Exercise:  [x] See flow sheet : Rationale: increase ROM and increase strength to improve the patients ability to perform ADLs    10 min Therapeutic Activity:  [x]  See flow sheet : FOTO, reassessment   Rationale: increase ROM, increase strength, improve coordination, improve balance and increase proprioception  to improve the patients ability to improve mobility and ADL performance      17 min Neuromuscular Re-education:  [x]  See flow sheet : scap re-ed activities   Rationale: increase ROM, increase strength, improve coordination, improve balance and increase proprioception  to improve the patients ability to improve mobility and reaching       With   [x] TE   [x] TA   [x] neuro   [] other: Patient Education: [x] Review HEP    [] Progressed/Changed HEP based on:   [x] positioning   [x] body mechanics   [] transfers   [] heat/ice application    [] other:      Other Objective/Functional Measures:   FOTO 63    MMT right lower trap 4+/5  MMT left lower trap 4/5    MMT right rhomboid 5/5  MMT left rhomboid 5/5    Right  strength = 77#  Left  strength = 75#     Pain Level (0-10 scale) post treatment: 2.5    ASSESSMENT/Changes in Function: Mr. Lance Lozano reports 25-30% improvement since beginning therapy. Pt demonstrates an improvement with scapular strength. He still has some strength deficits, but should improve with continued HEP adherence. He continues to respond well to cervical traction, but radicular sx continue to return. Advised pt to follow up with MD to which he states he does on 10/19/2021. We are discharging to an updated HEP at this time.      [x]  See Plan of Care  []  See progress note/recertification  []  See Discharge Summary         Progress towards goals / Updated goals:  1. Pt will report increased functional independence as noted by FOTO score of 61.               PN: 60              MET; 63  2. Pt will demonstrate improved bilateral low trap and right rhomboid strength of 5/5 in order to improve ability to tolerate sustained postures.               PN:LT: 4/5, rhomboids 5/5              PROGRESSED WELL; lower trap: right 4+/5, left 4/5, B rhomboids 5/5  3. Pt will demonstrate increased right  strength to 80lbs in order to improve ability to hold and carry items.              PN: 75# on right and left               PROGRESSED WELL; 77# on the right and 75# on the left    Functional Gains: better ROM in neck, looking over shoulder, reaching, gripping objects, fine motor skills  Functional Deficits: pain, radicular sx, computer use  % improvement: 25-30%  Pain   Average: 4/10       Best: 1-2/10     Worst: 7-8/10  Patient Goal: \"minimize the pain and symptoms as much as possible\"    PLAN  []  Upgrade activities as tolerated     [x]  Continue plan of care  []  Update interventions per flow sheet       []  Discharge due to:_  []  Other:_      Betsy Messina, PTA, CSCS 10/14/2021  2:21 PM    No future appointments.

## 2021-10-14 NOTE — PROGRESS NOTES
Physical Therapy Discharge Instructions      [facility] [address]  [address] [facility], [address] [phone] fax      Patient: Bethany Hall  : 1980      Continue Home Exercise Program 1-2 times per day      Continue with    [] Ice  as needed      [x] Heat           Follow up with MD:     [] Upon completion of therapy     [x] As needed      Recommendations:     [x]   Return to activity with home program    []   Return to activity with the following modifications:       []Post Rehab Program    []Join Independent aquatic program     []Return to/join local gym      Marko Thomas PTA, CSCS   10/14/2021 2:10 PM

## 2021-10-22 NOTE — PROGRESS NOTES
[facility] [place] [address]  [address], [address] [phone] fax    Discharge Summary  Patient name: Aditya Zuniga Start of Care: 2021   Referral source: Jelly Schmidt MD : 1980                Medical Diagnosis: Neck pain [M54.2]  Payor: Aquiles Salinas / Plan: [facility] COMMUNITY PLAN [employer] / Product Type: Managed Care Medicaid /  Onset Date:May 2020                Treatment Diagnosis: neck pain   Prior Hospitalization: see medical history Provider#: [id number]   Medications: Verified on Patient summary List    Comorbidities: Depression, Arthritis, Tobacco Use, Asthma, Scoliosis, Congential Hip Displaysia   Prior Level of Function: functionally (I), right hand dominant, frequent computer use    Visits from Start of Care: 28    Missed Visits: 2    Reporting Period : 21 to 10/14/21    1. Pt will report increased functional independence as noted by FOTO score of 61.               PN: 60              MET; 63  2. Pt will demonstrate improved bilateral low trap and right rhomboid strength of 5/5 in order to improve ability to tolerate sustained postures.               PN:LT: 4/5, rhomboids 5/5              PROGRESSED WELL; lower trap: right 4+/5, left 4/5, B rhomboids 5/5  3. Pt will demonstrate increased right  strength to 80lbs in order to improve ability to hold and carry items.              PN: 75# on right and left               PROGRESSED WELL; 77# on the right and 75# on the left     Functional Gains: better ROM in neck, looking over shoulder, reaching, gripping objects, fine motor skills  Functional Deficits: pain, radicular sx, computer use  % improvement: 25-30%  Pain   Average: 4/10                  Best: 1-2/10                Worst: -8/10  Patient Goal: \"minimize the pain and symptoms as much as possible\"    Assessment/Summary of care: Mr. Yanira Yates reports 25-30% improvement since beginning therapy.  Pt demonstrates an improvement with scapular strength. He still has some strength deficits, but should improve with continued HEP adherence. He continues to respond well to cervical traction, but radicular sx continue to return. Advised pt to follow up with MD to which he states he does on 10/19/2021. We are discharging to an updated HEP at this time. RECOMMENDATIONS:  [x]Discontinue therapy    Arcadio Reji, PT 10/22/2021 10:04 AM    NOTE TO PHYSICIAN:  Please complete the following and fax to: [facility] at [facility] at [phone]  . Retain this original for your records. If you are unable to process this request in   24 hours, please contact our office.      [] I have read the above report and request that my patient continue therapy with the following changes/special instructions:  [] I have read the above report and request that my patient be discharged from therapy    Physician's Signature:____________Date:_________TIME:________     Antonia Packer MD  ** Signature, Date and Time must be completed for valid certification **

## 2022-01-10 ENCOUNTER — HOSPITAL ENCOUNTER (OUTPATIENT)
Dept: PHYSICAL THERAPY | Age: 42
End: 2022-01-10

## 2022-06-29 PROBLEM — M16.12 PRIMARY OSTEOARTHRITIS OF LEFT HIP: Status: ACTIVE | Noted: 2022-06-29
